# Patient Record
Sex: MALE | Race: BLACK OR AFRICAN AMERICAN | Employment: OTHER | URBAN - METROPOLITAN AREA
[De-identification: names, ages, dates, MRNs, and addresses within clinical notes are randomized per-mention and may not be internally consistent; named-entity substitution may affect disease eponyms.]

---

## 2017-05-13 PROBLEM — G40.909 SEIZURE DISORDER (HCC): Chronic | Status: ACTIVE | Noted: 2017-05-13

## 2017-05-13 PROBLEM — N18.30 CKD (CHRONIC KIDNEY DISEASE) STAGE 3, GFR 30-59 ML/MIN (HCC): Chronic | Status: ACTIVE | Noted: 2017-05-13

## 2017-05-13 PROBLEM — R77.8 ELEVATED TROPONIN: Status: ACTIVE | Noted: 2017-05-13

## 2017-05-15 PROBLEM — M86.9 TOE OSTEOMYELITIS, LEFT (HCC): Status: ACTIVE | Noted: 2017-05-15

## 2017-05-23 ENCOUNTER — HOSPITAL ENCOUNTER (OUTPATIENT)
Dept: WOUND CARE | Age: 67
Discharge: HOME | End: 2017-05-23
Attending: PODIATRIST | Admitting: PODIATRIST

## 2017-05-23 VITALS — DIASTOLIC BLOOD PRESSURE: 89 MMHG | SYSTOLIC BLOOD PRESSURE: 136 MMHG | HEART RATE: 85 BPM | TEMPERATURE: 97.5 F

## 2017-05-23 DIAGNOSIS — S81.801A NON-HEALING WOUND OF LOWER EXTREMITY, RIGHT, INITIAL ENCOUNTER: ICD-10-CM

## 2017-05-23 DIAGNOSIS — E11.621 ISCHEMIC ULCER DIABETIC FOOT (HCC): Primary | ICD-10-CM

## 2017-05-23 DIAGNOSIS — L97.509 ISCHEMIC ULCER DIABETIC FOOT (HCC): Primary | ICD-10-CM

## 2017-05-23 LAB
GLUCOSE BLD-MCNC: 233 MG/DL (ref 70–99)
PERFORMED ON: ABNORMAL

## 2017-05-23 RX ORDER — LIDOCAINE HYDROCHLORIDE 40 MG/ML
SOLUTION TOPICAL ONCE
Status: COMPLETED | OUTPATIENT
Start: 2017-05-23 | End: 2017-05-23

## 2017-05-23 RX ADMIN — LIDOCAINE HYDROCHLORIDE: 40 SOLUTION TOPICAL at 16:01

## 2017-05-23 ASSESSMENT — PAIN SCALES - GENERAL: PAINLEVEL_OUTOF10: 5

## 2017-05-23 ASSESSMENT — PAIN DESCRIPTION - DESCRIPTORS: DESCRIPTORS: SHARP

## 2017-05-23 ASSESSMENT — PAIN DESCRIPTION - LOCATION: LOCATION: SHOULDER

## 2017-05-23 ASSESSMENT — PAIN DESCRIPTION - PAIN TYPE: TYPE: CHRONIC PAIN

## 2017-05-23 ASSESSMENT — PAIN DESCRIPTION - FREQUENCY: FREQUENCY: INTERMITTENT

## 2017-05-23 ASSESSMENT — PAIN DESCRIPTION - ORIENTATION: ORIENTATION: LEFT

## 2017-05-31 ENCOUNTER — OFFICE VISIT (OUTPATIENT)
Dept: CARDIOLOGY CLINIC | Age: 67
End: 2017-05-31

## 2017-05-31 VITALS — DIASTOLIC BLOOD PRESSURE: 68 MMHG | HEART RATE: 74 BPM | SYSTOLIC BLOOD PRESSURE: 118 MMHG

## 2017-05-31 DIAGNOSIS — I73.9 PAD (PERIPHERAL ARTERY DISEASE) (HCC): Primary | ICD-10-CM

## 2017-05-31 DIAGNOSIS — I69.30 HISTORY OF CVA WITH RESIDUAL DEFICIT: Chronic | ICD-10-CM

## 2017-05-31 DIAGNOSIS — I15.9 SECONDARY HYPERTENSION: ICD-10-CM

## 2017-05-31 PROCEDURE — G8420 CALC BMI NORM PARAMETERS: HCPCS | Performed by: INTERNAL MEDICINE

## 2017-05-31 PROCEDURE — 4040F PNEUMOC VAC/ADMIN/RCVD: CPT | Performed by: INTERNAL MEDICINE

## 2017-05-31 PROCEDURE — 93000 ELECTROCARDIOGRAM COMPLETE: CPT | Performed by: INTERNAL MEDICINE

## 2017-05-31 PROCEDURE — 3017F COLORECTAL CA SCREEN DOC REV: CPT | Performed by: INTERNAL MEDICINE

## 2017-05-31 PROCEDURE — G8427 DOCREV CUR MEDS BY ELIG CLIN: HCPCS | Performed by: INTERNAL MEDICINE

## 2017-05-31 PROCEDURE — 1123F ACP DISCUSS/DSCN MKR DOCD: CPT | Performed by: INTERNAL MEDICINE

## 2017-05-31 PROCEDURE — 1111F DSCHRG MED/CURRENT MED MERGE: CPT | Performed by: INTERNAL MEDICINE

## 2017-05-31 PROCEDURE — 1036F TOBACCO NON-USER: CPT | Performed by: INTERNAL MEDICINE

## 2017-05-31 PROCEDURE — 99204 OFFICE O/P NEW MOD 45 MIN: CPT | Performed by: INTERNAL MEDICINE

## 2017-06-05 ENCOUNTER — HOSPITAL ENCOUNTER (OUTPATIENT)
Dept: VASCULAR LAB | Age: 67
Discharge: HOME | End: 2017-06-05
Attending: INTERNAL MEDICINE | Admitting: INTERNAL MEDICINE

## 2017-06-05 DIAGNOSIS — I73.9 PERIPHERAL VASCULAR DISEASE (HCC): ICD-10-CM

## 2017-06-12 ENCOUNTER — TELEPHONE (OUTPATIENT)
Dept: WOUND CARE | Age: 67
End: 2017-06-12

## 2017-06-20 ENCOUNTER — HOSPITAL ENCOUNTER (OUTPATIENT)
Dept: WOUND CARE | Age: 67
Discharge: HOME | End: 2017-06-20
Attending: PODIATRIST | Admitting: PODIATRIST

## 2017-06-20 VITALS
HEART RATE: 81 BPM | SYSTOLIC BLOOD PRESSURE: 174 MMHG | TEMPERATURE: 97.8 F | DIASTOLIC BLOOD PRESSURE: 90 MMHG | RESPIRATION RATE: 18 BRPM

## 2017-06-20 LAB
GLUCOSE BLD-MCNC: 248 MG/DL (ref 70–99)
PERFORMED ON: ABNORMAL

## 2017-06-20 RX ORDER — LIDOCAINE HYDROCHLORIDE 40 MG/ML
SOLUTION TOPICAL ONCE
Status: COMPLETED | OUTPATIENT
Start: 2017-06-20 | End: 2017-06-20

## 2017-06-20 RX ADMIN — LIDOCAINE HYDROCHLORIDE: 40 SOLUTION TOPICAL at 14:58

## 2017-06-20 ASSESSMENT — PAIN DESCRIPTION - LOCATION: LOCATION: FOOT

## 2017-06-20 ASSESSMENT — PAIN DESCRIPTION - ORIENTATION: ORIENTATION: RIGHT

## 2017-06-20 ASSESSMENT — PAIN DESCRIPTION - PAIN TYPE: TYPE: ACUTE PAIN

## 2017-06-20 ASSESSMENT — PAIN SCALES - GENERAL: PAINLEVEL_OUTOF10: 6

## 2017-06-20 ASSESSMENT — PAIN DESCRIPTION - DESCRIPTORS: DESCRIPTORS: ACHING

## 2017-06-20 ASSESSMENT — PAIN DESCRIPTION - ONSET: ONSET: ON-GOING

## 2017-06-20 ASSESSMENT — PAIN DESCRIPTION - FREQUENCY: FREQUENCY: INTERMITTENT

## 2017-06-29 ENCOUNTER — HOSPITAL ENCOUNTER (OUTPATIENT)
Dept: WOUND CARE | Age: 67
Discharge: HOME | End: 2017-06-29
Attending: PODIATRIST | Admitting: PODIATRIST

## 2017-06-29 VITALS
RESPIRATION RATE: 18 BRPM | DIASTOLIC BLOOD PRESSURE: 67 MMHG | SYSTOLIC BLOOD PRESSURE: 112 MMHG | TEMPERATURE: 98.2 F | HEART RATE: 85 BPM

## 2017-06-29 LAB
GLUCOSE BLD-MCNC: 213 MG/DL (ref 70–99)
PERFORMED ON: ABNORMAL

## 2017-06-29 RX ORDER — LIDOCAINE HYDROCHLORIDE 40 MG/ML
SOLUTION TOPICAL ONCE
Status: DISCONTINUED | OUTPATIENT
Start: 2017-06-29 | End: 2017-06-30 | Stop reason: HOSPADM

## 2017-07-06 ENCOUNTER — HOSPITAL ENCOUNTER (OUTPATIENT)
Dept: WOUND CARE | Age: 67
Discharge: HOME | End: 2017-07-06
Attending: PODIATRIST | Admitting: PODIATRIST

## 2017-07-06 VITALS
RESPIRATION RATE: 20 BRPM | HEART RATE: 83 BPM | SYSTOLIC BLOOD PRESSURE: 173 MMHG | DIASTOLIC BLOOD PRESSURE: 88 MMHG | TEMPERATURE: 98.2 F

## 2017-07-06 LAB
GLUCOSE BLD-MCNC: 176 MG/DL (ref 70–99)
PERFORMED ON: ABNORMAL

## 2017-07-06 RX ORDER — LIDOCAINE HYDROCHLORIDE 40 MG/ML
SOLUTION TOPICAL ONCE
Status: COMPLETED | OUTPATIENT
Start: 2017-07-06 | End: 2017-07-06

## 2017-07-06 RX ADMIN — LIDOCAINE HYDROCHLORIDE: 40 SOLUTION TOPICAL at 15:35

## 2017-07-11 ENCOUNTER — HOSPITAL ENCOUNTER (OUTPATIENT)
Dept: WOUND CARE | Age: 67
Discharge: HOME | End: 2017-07-11
Attending: PODIATRIST | Admitting: PODIATRIST

## 2017-07-11 VITALS
HEART RATE: 78 BPM | DIASTOLIC BLOOD PRESSURE: 86 MMHG | TEMPERATURE: 97.9 F | RESPIRATION RATE: 20 BRPM | SYSTOLIC BLOOD PRESSURE: 167 MMHG

## 2017-07-11 RX ORDER — LIDOCAINE HYDROCHLORIDE 40 MG/ML
SOLUTION TOPICAL ONCE
Status: COMPLETED | OUTPATIENT
Start: 2017-07-11 | End: 2017-07-11

## 2017-07-11 RX ADMIN — LIDOCAINE HYDROCHLORIDE: 40 SOLUTION TOPICAL at 15:36

## 2017-07-11 ASSESSMENT — PAIN DESCRIPTION - LOCATION: LOCATION: TOE (COMMENT WHICH ONE)

## 2017-07-11 ASSESSMENT — PAIN DESCRIPTION - DESCRIPTORS: DESCRIPTORS: ACHING

## 2017-07-11 ASSESSMENT — PAIN SCALES - GENERAL: PAINLEVEL_OUTOF10: 3

## 2017-07-11 ASSESSMENT — PAIN DESCRIPTION - ONSET: ONSET: ON-GOING

## 2017-07-11 ASSESSMENT — PAIN DESCRIPTION - PAIN TYPE: TYPE: ACUTE PAIN;CHRONIC PAIN

## 2017-07-11 ASSESSMENT — PAIN DESCRIPTION - ORIENTATION: ORIENTATION: RIGHT

## 2017-07-11 ASSESSMENT — PAIN DESCRIPTION - FREQUENCY: FREQUENCY: INTERMITTENT

## 2017-07-12 ENCOUNTER — OFFICE VISIT (OUTPATIENT)
Dept: CARDIOLOGY CLINIC | Age: 67
End: 2017-07-12

## 2017-07-12 ENCOUNTER — HOSPITAL ENCOUNTER (OUTPATIENT)
Dept: VASCULAR LAB | Age: 67
Discharge: HOME | End: 2017-07-12
Attending: INTERNAL MEDICINE | Admitting: INTERNAL MEDICINE

## 2017-07-12 VITALS
BODY MASS INDEX: 25.77 KG/M2 | SYSTOLIC BLOOD PRESSURE: 132 MMHG | HEART RATE: 82 BPM | WEIGHT: 184.8 LBS | DIASTOLIC BLOOD PRESSURE: 80 MMHG

## 2017-07-12 DIAGNOSIS — I50.42 SYSTOLIC AND DIASTOLIC CHF, CHRONIC (HCC): Chronic | ICD-10-CM

## 2017-07-12 DIAGNOSIS — I73.9 PERIPHERAL VASCULAR DISEASE (HCC): ICD-10-CM

## 2017-07-12 DIAGNOSIS — E11.628 DIABETIC FOOT INFECTION (HCC): ICD-10-CM

## 2017-07-12 DIAGNOSIS — I73.9 PAD (PERIPHERAL ARTERY DISEASE) (HCC): Primary | ICD-10-CM

## 2017-07-12 DIAGNOSIS — L08.9 DIABETIC FOOT INFECTION (HCC): ICD-10-CM

## 2017-07-12 DIAGNOSIS — I15.9 SECONDARY HYPERTENSION: ICD-10-CM

## 2017-07-12 PROCEDURE — 99214 OFFICE O/P EST MOD 30 MIN: CPT | Performed by: INTERNAL MEDICINE

## 2017-07-12 PROCEDURE — G8419 CALC BMI OUT NRM PARAM NOF/U: HCPCS | Performed by: INTERNAL MEDICINE

## 2017-07-12 PROCEDURE — 1036F TOBACCO NON-USER: CPT | Performed by: INTERNAL MEDICINE

## 2017-07-12 PROCEDURE — G8427 DOCREV CUR MEDS BY ELIG CLIN: HCPCS | Performed by: INTERNAL MEDICINE

## 2017-07-12 PROCEDURE — 3046F HEMOGLOBIN A1C LEVEL >9.0%: CPT | Performed by: INTERNAL MEDICINE

## 2017-07-12 PROCEDURE — 1123F ACP DISCUSS/DSCN MKR DOCD: CPT | Performed by: INTERNAL MEDICINE

## 2017-07-12 PROCEDURE — 3017F COLORECTAL CA SCREEN DOC REV: CPT | Performed by: INTERNAL MEDICINE

## 2017-07-12 PROCEDURE — 4040F PNEUMOC VAC/ADMIN/RCVD: CPT | Performed by: INTERNAL MEDICINE

## 2017-07-17 DIAGNOSIS — I73.9 PAD (PERIPHERAL ARTERY DISEASE) (HCC): Primary | ICD-10-CM

## 2017-07-17 DIAGNOSIS — I15.9 SECONDARY HYPERTENSION: ICD-10-CM

## 2017-07-18 ENCOUNTER — HOSPITAL ENCOUNTER (OUTPATIENT)
Dept: WOUND CARE | Age: 67
Discharge: HOME | End: 2017-07-18
Attending: PODIATRIST | Admitting: PODIATRIST

## 2017-07-18 VITALS
HEART RATE: 89 BPM | SYSTOLIC BLOOD PRESSURE: 149 MMHG | RESPIRATION RATE: 20 BRPM | TEMPERATURE: 96 F | DIASTOLIC BLOOD PRESSURE: 81 MMHG

## 2017-07-18 LAB
GLUCOSE BLD-MCNC: 123 MG/DL (ref 70–99)
PERFORMED ON: ABNORMAL

## 2017-07-18 RX ORDER — LIDOCAINE HYDROCHLORIDE 40 MG/ML
SOLUTION TOPICAL ONCE
Status: COMPLETED | OUTPATIENT
Start: 2017-07-18 | End: 2017-07-18

## 2017-07-18 RX ADMIN — LIDOCAINE HYDROCHLORIDE: 40 SOLUTION TOPICAL at 15:27

## 2017-07-18 ASSESSMENT — PAIN DESCRIPTION - ORIENTATION: ORIENTATION: LEFT

## 2017-07-18 ASSESSMENT — PAIN DESCRIPTION - PAIN TYPE: TYPE: ACUTE PAIN;CHRONIC PAIN

## 2017-07-18 ASSESSMENT — PAIN DESCRIPTION - LOCATION: LOCATION: SHOULDER

## 2017-07-18 ASSESSMENT — PAIN DESCRIPTION - FREQUENCY: FREQUENCY: INTERMITTENT

## 2017-07-18 ASSESSMENT — PAIN SCALES - GENERAL: PAINLEVEL_OUTOF10: 7

## 2017-07-18 ASSESSMENT — PAIN DESCRIPTION - ONSET: ONSET: ON-GOING

## 2017-07-18 ASSESSMENT — PAIN DESCRIPTION - DESCRIPTORS: DESCRIPTORS: ACHING

## 2017-07-19 ENCOUNTER — TELEPHONE (OUTPATIENT)
Dept: CARDIOLOGY CLINIC | Age: 67
End: 2017-07-19

## 2017-07-20 ENCOUNTER — TELEPHONE (OUTPATIENT)
Dept: WOUND CARE | Age: 67
End: 2017-07-20

## 2017-07-25 ENCOUNTER — HOSPITAL ENCOUNTER (OUTPATIENT)
Dept: WOUND CARE | Age: 67
Discharge: HOME | End: 2017-07-25
Attending: PODIATRIST | Admitting: PODIATRIST

## 2017-07-25 VITALS
HEART RATE: 83 BPM | DIASTOLIC BLOOD PRESSURE: 73 MMHG | SYSTOLIC BLOOD PRESSURE: 142 MMHG | RESPIRATION RATE: 16 BRPM | TEMPERATURE: 98.2 F

## 2017-07-25 RX ORDER — LIDOCAINE HYDROCHLORIDE 40 MG/ML
SOLUTION TOPICAL ONCE
Status: COMPLETED | OUTPATIENT
Start: 2017-07-25 | End: 2017-07-25

## 2017-07-25 RX ADMIN — LIDOCAINE HYDROCHLORIDE: 40 SOLUTION TOPICAL at 15:16

## 2017-08-01 ENCOUNTER — HOSPITAL ENCOUNTER (OUTPATIENT)
Dept: WOUND CARE | Age: 67
Discharge: HOME | End: 2017-08-01
Attending: PODIATRIST | Admitting: PODIATRIST

## 2017-08-01 VITALS
RESPIRATION RATE: 18 BRPM | TEMPERATURE: 98.2 F | DIASTOLIC BLOOD PRESSURE: 82 MMHG | HEART RATE: 74 BPM | SYSTOLIC BLOOD PRESSURE: 141 MMHG

## 2017-08-01 RX ORDER — LIDOCAINE HYDROCHLORIDE 40 MG/ML
SOLUTION TOPICAL ONCE
Status: COMPLETED | OUTPATIENT
Start: 2017-08-01 | End: 2017-08-01

## 2017-08-01 RX ADMIN — LIDOCAINE HYDROCHLORIDE: 40 SOLUTION TOPICAL at 15:34

## 2017-08-01 ASSESSMENT — PAIN DESCRIPTION - ONSET: ONSET: ON-GOING

## 2017-08-01 ASSESSMENT — PAIN DESCRIPTION - ORIENTATION: ORIENTATION: LEFT;RIGHT

## 2017-08-01 ASSESSMENT — PAIN SCALES - GENERAL: PAINLEVEL_OUTOF10: 7

## 2017-08-01 ASSESSMENT — PAIN DESCRIPTION - DESCRIPTORS: DESCRIPTORS: ACHING

## 2017-08-01 ASSESSMENT — PAIN DESCRIPTION - PAIN TYPE: TYPE: CHRONIC PAIN

## 2017-08-01 ASSESSMENT — PAIN DESCRIPTION - LOCATION: LOCATION: SHOULDER;BACK;FOOT

## 2017-08-01 ASSESSMENT — PAIN DESCRIPTION - FREQUENCY: FREQUENCY: INTERMITTENT

## 2017-08-08 ENCOUNTER — HOSPITAL ENCOUNTER (OUTPATIENT)
Dept: WOUND CARE | Age: 67
Discharge: HOME | End: 2017-08-08
Attending: PODIATRIST | Admitting: PODIATRIST

## 2017-08-08 VITALS
TEMPERATURE: 98.2 F | SYSTOLIC BLOOD PRESSURE: 155 MMHG | HEART RATE: 80 BPM | RESPIRATION RATE: 18 BRPM | DIASTOLIC BLOOD PRESSURE: 77 MMHG

## 2017-08-08 LAB
GLUCOSE BLD-MCNC: 252 MG/DL (ref 70–99)
PERFORMED ON: ABNORMAL

## 2017-08-08 RX ORDER — LIDOCAINE HYDROCHLORIDE 40 MG/ML
SOLUTION TOPICAL ONCE
Status: COMPLETED | OUTPATIENT
Start: 2017-08-08 | End: 2017-08-08

## 2017-08-08 RX ADMIN — LIDOCAINE HYDROCHLORIDE: 40 SOLUTION TOPICAL at 15:20

## 2017-08-08 ASSESSMENT — PAIN SCALES - GENERAL: PAINLEVEL_OUTOF10: 4

## 2017-08-08 ASSESSMENT — PAIN DESCRIPTION - LOCATION: LOCATION: LEG

## 2017-08-08 ASSESSMENT — PAIN DESCRIPTION - DESCRIPTORS: DESCRIPTORS: ACHING

## 2017-08-15 ENCOUNTER — HOSPITAL ENCOUNTER (OUTPATIENT)
Dept: WOUND CARE | Age: 67
Discharge: HOME | End: 2017-08-15
Attending: PODIATRIST | Admitting: PODIATRIST

## 2017-08-15 VITALS
RESPIRATION RATE: 18 BRPM | SYSTOLIC BLOOD PRESSURE: 145 MMHG | DIASTOLIC BLOOD PRESSURE: 83 MMHG | TEMPERATURE: 98 F | HEART RATE: 73 BPM

## 2017-08-15 LAB
GLUCOSE BLD-MCNC: 355 MG/DL (ref 70–99)
PERFORMED ON: ABNORMAL

## 2017-08-15 RX ORDER — LIDOCAINE HYDROCHLORIDE 40 MG/ML
SOLUTION TOPICAL ONCE
Status: COMPLETED | OUTPATIENT
Start: 2017-08-15 | End: 2017-08-15

## 2017-08-15 RX ADMIN — LIDOCAINE HYDROCHLORIDE: 40 SOLUTION TOPICAL at 14:32

## 2017-08-15 ASSESSMENT — PAIN DESCRIPTION - PAIN TYPE: TYPE: ACUTE PAIN

## 2017-08-15 ASSESSMENT — PAIN DESCRIPTION - DESCRIPTORS: DESCRIPTORS: ACHING

## 2017-08-15 ASSESSMENT — PAIN DESCRIPTION - ORIENTATION: ORIENTATION: RIGHT

## 2017-08-15 ASSESSMENT — PAIN SCALES - GENERAL: PAINLEVEL_OUTOF10: 6

## 2017-08-15 ASSESSMENT — PAIN DESCRIPTION - LOCATION: LOCATION: SHOULDER;LEG;FOOT

## 2017-08-15 ASSESSMENT — PAIN DESCRIPTION - FREQUENCY: FREQUENCY: INTERMITTENT

## 2017-08-22 ENCOUNTER — HOSPITAL ENCOUNTER (OUTPATIENT)
Dept: WOUND CARE | Age: 67
Discharge: HOME | End: 2017-08-22
Attending: PODIATRIST | Admitting: PODIATRIST

## 2017-08-22 VITALS
HEART RATE: 75 BPM | SYSTOLIC BLOOD PRESSURE: 130 MMHG | DIASTOLIC BLOOD PRESSURE: 76 MMHG | RESPIRATION RATE: 18 BRPM | TEMPERATURE: 97.9 F

## 2017-08-22 LAB
GLUCOSE BLD-MCNC: 186 MG/DL (ref 70–99)
PERFORMED ON: ABNORMAL

## 2017-08-22 RX ORDER — LIDOCAINE HYDROCHLORIDE 40 MG/ML
SOLUTION TOPICAL ONCE
Status: COMPLETED | OUTPATIENT
Start: 2017-08-22 | End: 2017-08-22

## 2017-08-22 RX ADMIN — LIDOCAINE HYDROCHLORIDE: 40 SOLUTION TOPICAL at 15:06

## 2017-08-29 ENCOUNTER — HOSPITAL ENCOUNTER (OUTPATIENT)
Dept: WOUND CARE | Age: 67
Discharge: HOME | End: 2017-08-29
Attending: PODIATRIST | Admitting: PODIATRIST

## 2017-08-29 VITALS
TEMPERATURE: 98 F | HEART RATE: 76 BPM | SYSTOLIC BLOOD PRESSURE: 153 MMHG | DIASTOLIC BLOOD PRESSURE: 71 MMHG | RESPIRATION RATE: 18 BRPM

## 2017-08-29 LAB
GLUCOSE BLD-MCNC: 103 MG/DL (ref 70–99)
PERFORMED ON: ABNORMAL

## 2017-08-29 RX ORDER — LIDOCAINE HYDROCHLORIDE 40 MG/ML
2.5 SOLUTION TOPICAL ONCE
Status: COMPLETED | OUTPATIENT
Start: 2017-08-29 | End: 2017-08-29

## 2017-08-29 RX ADMIN — LIDOCAINE HYDROCHLORIDE 2.5 ML: 40 SOLUTION TOPICAL at 15:03

## 2017-08-29 ASSESSMENT — PAIN DESCRIPTION - LOCATION: LOCATION: BACK;KNEE;SHOULDER

## 2017-08-29 ASSESSMENT — PAIN DESCRIPTION - DESCRIPTORS: DESCRIPTORS: ACHING

## 2017-08-29 ASSESSMENT — PAIN SCALES - GENERAL: PAINLEVEL_OUTOF10: 3

## 2017-08-29 ASSESSMENT — PAIN DESCRIPTION - ORIENTATION: ORIENTATION: LEFT;RIGHT

## 2017-09-05 ENCOUNTER — HOSPITAL ENCOUNTER (OUTPATIENT)
Dept: WOUND CARE | Age: 67
Discharge: HOME | End: 2017-09-05
Attending: PODIATRIST | Admitting: PODIATRIST

## 2017-09-05 VITALS
HEART RATE: 73 BPM | SYSTOLIC BLOOD PRESSURE: 144 MMHG | DIASTOLIC BLOOD PRESSURE: 73 MMHG | TEMPERATURE: 97.9 F | RESPIRATION RATE: 18 BRPM

## 2017-09-05 LAB
GLUCOSE BLD-MCNC: 171 MG/DL (ref 70–99)
PERFORMED ON: ABNORMAL

## 2017-09-05 RX ORDER — LIDOCAINE HYDROCHLORIDE 40 MG/ML
2.5 SOLUTION TOPICAL ONCE
Status: COMPLETED | OUTPATIENT
Start: 2017-09-05 | End: 2017-09-05

## 2017-09-05 RX ADMIN — LIDOCAINE HYDROCHLORIDE 2.5 ML: 40 SOLUTION TOPICAL at 15:45

## 2017-09-05 ASSESSMENT — PAIN DESCRIPTION - LOCATION: LOCATION: SHOULDER

## 2017-09-05 ASSESSMENT — PAIN DESCRIPTION - ORIENTATION: ORIENTATION: LEFT

## 2017-09-05 ASSESSMENT — PAIN DESCRIPTION - PAIN TYPE: TYPE: CHRONIC PAIN

## 2017-09-05 ASSESSMENT — PAIN SCALES - GENERAL: PAINLEVEL_OUTOF10: 3

## 2017-09-05 ASSESSMENT — PAIN DESCRIPTION - DESCRIPTORS: DESCRIPTORS: ACHING;DULL;SHARP

## 2017-09-08 RX ORDER — CARVEDILOL 6.25 MG/1
6.25 TABLET ORAL 2 TIMES DAILY WITH MEALS
Qty: 180 TABLET | Refills: 3 | Status: ON HOLD | OUTPATIENT
Start: 2017-09-08 | End: 2018-01-01 | Stop reason: HOSPADM

## 2017-09-12 ENCOUNTER — HOSPITAL ENCOUNTER (OUTPATIENT)
Dept: WOUND CARE | Age: 67
Discharge: HOME | End: 2017-09-12
Attending: PODIATRIST | Admitting: PODIATRIST

## 2017-09-12 VITALS
HEART RATE: 80 BPM | RESPIRATION RATE: 18 BRPM | TEMPERATURE: 98.7 F | DIASTOLIC BLOOD PRESSURE: 103 MMHG | SYSTOLIC BLOOD PRESSURE: 197 MMHG

## 2017-09-12 LAB
GLUCOSE BLD-MCNC: 333 MG/DL (ref 70–99)
PERFORMED ON: ABNORMAL

## 2017-09-12 RX ORDER — LIDOCAINE HYDROCHLORIDE 40 MG/ML
SOLUTION TOPICAL ONCE
Status: COMPLETED | OUTPATIENT
Start: 2017-09-12 | End: 2017-09-12

## 2017-09-19 ENCOUNTER — HOSPITAL ENCOUNTER (OUTPATIENT)
Dept: WOUND CARE | Age: 67
Discharge: HOME | End: 2017-09-19
Attending: PODIATRIST | Admitting: PODIATRIST

## 2017-09-19 VITALS
SYSTOLIC BLOOD PRESSURE: 156 MMHG | DIASTOLIC BLOOD PRESSURE: 63 MMHG | HEART RATE: 76 BPM | TEMPERATURE: 98.1 F | RESPIRATION RATE: 16 BRPM

## 2017-09-19 LAB
GLUCOSE BLD-MCNC: 244 MG/DL (ref 70–99)
PERFORMED ON: ABNORMAL

## 2017-09-19 RX ORDER — LIDOCAINE HYDROCHLORIDE 40 MG/ML
2.5 SOLUTION TOPICAL ONCE
Status: COMPLETED | OUTPATIENT
Start: 2017-09-19 | End: 2017-09-19

## 2017-09-19 RX ADMIN — LIDOCAINE HYDROCHLORIDE 2.5 ML: 40 SOLUTION TOPICAL at 15:07

## 2017-09-19 ASSESSMENT — PAIN DESCRIPTION - ORIENTATION: ORIENTATION: LEFT

## 2017-09-19 ASSESSMENT — PAIN DESCRIPTION - PAIN TYPE: TYPE: CHRONIC PAIN

## 2017-09-19 ASSESSMENT — PAIN SCALES - GENERAL: PAINLEVEL_OUTOF10: 7

## 2017-09-19 ASSESSMENT — PAIN DESCRIPTION - LOCATION: LOCATION: SHOULDER

## 2017-09-19 ASSESSMENT — PAIN DESCRIPTION - PROGRESSION: CLINICAL_PROGRESSION: NOT CHANGED

## 2017-09-19 ASSESSMENT — PAIN DESCRIPTION - DESCRIPTORS: DESCRIPTORS: ACHING

## 2017-09-19 ASSESSMENT — PAIN DESCRIPTION - FREQUENCY: FREQUENCY: CONTINUOUS

## 2017-09-19 ASSESSMENT — PAIN DESCRIPTION - ONSET: ONSET: ON-GOING

## 2017-09-26 ENCOUNTER — HOSPITAL ENCOUNTER (OUTPATIENT)
Dept: WOUND CARE | Age: 67
Discharge: HOME | End: 2017-09-26
Attending: PODIATRIST | Admitting: PODIATRIST

## 2017-09-26 VITALS
RESPIRATION RATE: 16 BRPM | DIASTOLIC BLOOD PRESSURE: 82 MMHG | SYSTOLIC BLOOD PRESSURE: 165 MMHG | TEMPERATURE: 98.2 F | HEART RATE: 76 BPM

## 2017-09-26 RX ORDER — LIDOCAINE HYDROCHLORIDE 40 MG/ML
SOLUTION TOPICAL ONCE
Status: COMPLETED | OUTPATIENT
Start: 2017-09-26 | End: 2017-09-26

## 2017-09-26 RX ADMIN — LIDOCAINE HYDROCHLORIDE: 40 SOLUTION TOPICAL at 15:21

## 2017-09-26 ASSESSMENT — PAIN DESCRIPTION - ORIENTATION: ORIENTATION: LEFT

## 2017-09-26 ASSESSMENT — PAIN DESCRIPTION - DESCRIPTORS: DESCRIPTORS: ACHING

## 2017-09-26 ASSESSMENT — PAIN DESCRIPTION - PAIN TYPE: TYPE: CHRONIC PAIN

## 2017-09-26 ASSESSMENT — PAIN DESCRIPTION - LOCATION: LOCATION: SHOULDER

## 2017-09-26 ASSESSMENT — PAIN DESCRIPTION - PROGRESSION: CLINICAL_PROGRESSION: NOT CHANGED

## 2017-09-26 ASSESSMENT — PAIN SCALES - GENERAL: PAINLEVEL_OUTOF10: 6

## 2017-10-05 ENCOUNTER — HOSPITAL ENCOUNTER (OUTPATIENT)
Dept: WOUND CARE | Age: 67
Discharge: HOME | End: 2017-10-05
Attending: PODIATRIST | Admitting: PODIATRIST

## 2017-10-05 VITALS
TEMPERATURE: 98.6 F | HEART RATE: 82 BPM | RESPIRATION RATE: 16 BRPM | SYSTOLIC BLOOD PRESSURE: 160 MMHG | DIASTOLIC BLOOD PRESSURE: 78 MMHG

## 2017-10-17 ENCOUNTER — HOSPITAL ENCOUNTER (OUTPATIENT)
Dept: WOUND CARE | Age: 67
Discharge: HOME | End: 2017-10-17
Attending: PODIATRIST | Admitting: PODIATRIST

## 2017-10-17 VITALS
TEMPERATURE: 97.5 F | HEART RATE: 88 BPM | RESPIRATION RATE: 16 BRPM | SYSTOLIC BLOOD PRESSURE: 158 MMHG | DIASTOLIC BLOOD PRESSURE: 93 MMHG

## 2017-10-17 RX ORDER — LIDOCAINE HYDROCHLORIDE 40 MG/ML
2.5 SOLUTION TOPICAL ONCE
Status: COMPLETED | OUTPATIENT
Start: 2017-10-17 | End: 2017-10-17

## 2017-10-17 RX ADMIN — LIDOCAINE HYDROCHLORIDE 2.5 ML: 40 SOLUTION TOPICAL at 15:15

## 2017-10-17 NOTE — PROGRESS NOTES
[address] [facility] [place]  Progress Note and Procedure Note      Xiomara Licona  MEDICAL RECORD NUMBER:  [id number]  AGE: 75 y.o. GENDER: male  : 1950  EPISODE DATE:  10/17/2017    Subjective:       Chief Complaint   Patient presents with    Wound Check     right foot         HISTORY of PRESENT ILLNESS HPI     Xiomara Licona is a 75 y.o. male who presents today for wound evaluation. History of Wound Context: Mr. Eve Virgen presents after having missed several appointments due to transportation issues, now has exposed phalanx of LEFT 2nd toe  Wound Pain Timing/Severity: none  Quality of pain: N/A  Severity:  0 / 10   Modifying Factors: None  Associated Signs/Symptoms: edema, erythema, drainage, numbness and odor    Ulcer Identification:  Ulcer Type: diabetic, traumatic and neuropathic  Contributing Factors: edema, diabetes, poor glucose control, chronic pressure, shear force and arterial insufficiency          PAST MEDICAL HISTORY        Diagnosis Date    CAD (coronary artery disease)     minor, per cardiac cath    Diabetes mellitus ([address]     Diabetic infection of left foot ([address]     w/osteomyelitis    H/O cocaine abuse 2015    History of CVA with residual deficit 2012    Hypertension     MRSA (methicillin resistant staph aureus) culture positive 2017    toe    Non-healing wound of lower extremity 2016    L great toe    Non-ischemic cardiomyopathy ([address]     per cardiac cath    PAD (peripheral artery disease) (HCC) 2016    L PIPER - 0.6, unable to obtain readings on R    Peripheral neuropathy (HCC)     Seizure, convulsion ([address] 2016    Dr. Raj Leung - due to scar from old L frontal infarction    Unspecified cerebral artery occlusion with cerebral infarction 2012       PAST SURGICAL HISTORY    Past Surgical History:   Procedure Laterality Date    CARDIAC CATHETERIZATION  2012    Dr. Martin Swann Right     FOOT TENDON SURGERY Left 05/24/2013    , DPM - I&D to antebellum cortex w/wound closure, perineal tendon transfer    KNEE ARTHROSCOPY      OTHER SURGICAL HISTORY Left 05/17/2017    INCISION AND DRAINAGE, AMPUTATION OF HALLUX LEFT FOOT    TOE AMPUTATION Left 05/21/2013    Dr. Linda Ball, DPM - I&D to bone cortex w/amputation of 5th metatarsal       FAMILY HISTORY    No family history on file. SOCIAL HISTORY    Social History   Substance Use Topics    Smoking status: Former Smoker     Types: Cigarettes     Quit date: 2/13/1993    Smokeless tobacco: Never Used      Comment: 2/13/2013 smokes a cigarette or cigar once or twice a year    Alcohol use Yes      Comment: 2/13/2013, drinks every so often (cold beer, or wine)       ALLERGIES    No Known Allergies    MEDICATIONS    Current Outpatient Prescriptions on File Prior to Encounter   Medication Sig Dispense Refill    carvedilol (COREG) 6.25 MG tablet Take 1 tablet by mouth 2 times daily (with meals) 180 tablet 3    aspirin 81 MG chewable tablet Take 1 tablet by mouth daily 90 tablet 3    gabapentin (NEURONTIN) 600 MG tablet Take 600 mg by mouth 2 times daily      busPIRone (BUSPAR) 10 MG tablet Take 7.5 mg by mouth 2 times daily      losartan (COZAAR) 25 MG tablet Take 25 mg by mouth daily      atorvastatin (LIPITOR) 10 MG tablet Take 10 mg by mouth nightly      insulin glargine (LANTUS) 100 UNIT/ML injection vial Inject 20 Units into the skin nightly      clopidogrel (PLAVIX) 75 MG tablet Take 1 tablet by mouth daily (Patient taking differently: Take 75 mg by mouth daily Patient states he is not taking) 90 tablet 3    HYDROcodone-acetaminophen (NORCO) 5-325 MG per tablet Take 1 tablet by mouth every 8 hours as needed for Pain  1 tab po q8h prn pain. 15 tablet 0     No current facility-administered medications on file prior to encounter. REVIEW OF SYSTEMS    Pertinent items are noted in HPI.       Objective:      BP (!) 158/93   Pulse 88 Temp 97.5 °F (36.4 °C) (Oral)   Resp 16     Wt Readings from Last 3 Encounters:   07/20/17 182 lb (82.6 kg)   07/12/17 184 lb 12.8 oz (83.8 kg)   05/18/17 175 lb 14.8 oz (79.8 kg)       PHYSICAL EXAM    General Appearance: alert and oriented to person, place and time, well-developed and well-nourished, in no acute distress          Assessment:     Patient Active Problem List   Diagnosis    Chest pain    Leg edema    Systolic and diastolic CHF, chronic ([address]    DM (diabetes mellitus) ([address]    Essential hypertension    History of CVA with residual deficit    Cellulitis of foot    Diabetic foot infection ([address]    MADYSON (acute kidney injury) ([address]    H/O cocaine abuse    New onset seizure ([address]    Chronic generalized pain    PAD (peripheral artery disease) ([address]    Seizure, convulsion ([address]    Diabetic infection of left foot ([address]    Non-ischemic cardiomyopathy ([address]    Non-healing wound of lower extremity    Osteomyelitis of left foot ([address]    Ischemic ulcer diabetic foot ([address]    Seizure disorder ([address]    Elevated troponin    CKD (chronic kidney disease) stage 3, GFR 30-59 ml/min    Toe osteomyelitis, left ([address]    Critical lower limb ischemia    S/P angioplasty with stent         Procedure Note  Indications:  Based on my examination of this patient's wound(s)/ulcer(s) today, debridement is required to promote healing and evaluate the extent healing. Performed by: Karl Moore DPM    Consent obtained: Yes    Time out taken:  Yes    Pain Control: Anesthetic  Anesthetic: 4% Topical Xylocaine       Debridement:Excisional Debridement    Using tissue nippers the wound(s)/ulcer(s) was/were sharply debrided down through and including the removal of muscle/fascia.     Devitalized Tissue Debrided:  distal digit    Pre Debridement Measurements:  Are located in the Wound/Ulcer Documentation Flow Sheet    Wound/Ulcer #: 3    Post Debridement Measurements:  Wound/Ulcer Descriptions are Pre Debridement except measurements:    Wound 05/23/17 Foot Right;Plantar;Lateral #3- diabetic (Active)   Wound Type Wound 10/17/2017  3:08 PM   Wound Diabetic Porter 2 9/12/2017  3:47 PM   Dressing/Treatment Other (Comment) 10/5/2017  2:27 PM   Wound Cleansed Rinsed/Irrigated with saline 10/17/2017  3:08 PM   Wound Length (cm) 2.2 cm 10/17/2017  3:08 PM   Wound Width (cm) 1.9 cm 10/17/2017  3:08 PM   Wound Depth (cm)  0.3 10/17/2017  3:08 PM   Calculated Wound Size (cm^2) (l*w) 4.18 cm^2 10/17/2017  3:08 PM   Change in Wound Size % (l*w) 9.52 10/17/2017  3:08 PM   Tunneling Position ___ O'Clock 0 9/26/2017  3:09 PM   Undermining Starts ___ O'Clock 1:00 10/17/2017  3:08 PM   Undermining Ends___ O'Clock 3:00 10/17/2017  3:08 PM   Undermining Maxium Distance (cm) 0.6 10/17/2017  3:08 PM   Wound Assessment Drainage;Kent; White 10/17/2017  3:08 PM   Margins Defined edges 10/17/2017  3:08 PM   Candida-wound Assessment Brown; Maceration;Pink; White 10/17/2017  3:08 PM   Non-staged Wound Description Full thickness 9/12/2017  3:47 PM   [place]%Wound Bed 70 10/17/2017  3:08 PM   Red%Wound Bed 90 9/5/2017  3:33 PM   Yellow%Wound Bed 20 10/17/2017  3:08 PM   Black%Wound Bed 10 10/17/2017  3:08 PM   Drainage Amount Moderate 10/17/2017  3:08 PM   Drainage Description Black; Brown;Yellow 10/17/2017  3:08 PM   Odor Mild 10/17/2017  3:08 PM   Op First Treatment Date 05/23/17 8/22/2017  2:55 PM   Number of days: 146       Percent of Wound Debrided: 100%    Total Surface Area Debrided:  4.18 sq cm     Diabetic/Pressure/Non Pressure Ulcers:  Ulcer: Diabetic ulcer, muscle necrosis        Bleeding:  Minimal    Hemostasis Achieved:  by pressure    Procedural Pain:  0  / 10     Post Procedural Pain:  0 / 10     Response to treatment:  Well tolerated by patient.      Plan:   Due to exposed bone, patient driectly admitted for surgical work up in next 48 hours for digital amputation and further wound follow up    Treatment Note please see attached Discharge

## 2017-10-24 ENCOUNTER — HOSPITAL ENCOUNTER (OUTPATIENT)
Dept: WOUND CARE | Age: 67
Discharge: HOME | End: 2017-10-24
Attending: PODIATRIST | Admitting: PODIATRIST

## 2017-10-24 VITALS
SYSTOLIC BLOOD PRESSURE: 161 MMHG | HEART RATE: 73 BPM | RESPIRATION RATE: 18 BRPM | DIASTOLIC BLOOD PRESSURE: 72 MMHG | TEMPERATURE: 97.7 F

## 2017-10-24 RX ORDER — LIDOCAINE HYDROCHLORIDE 40 MG/ML
2.5 SOLUTION TOPICAL ONCE
Status: COMPLETED | OUTPATIENT
Start: 2017-10-24 | End: 2017-10-24

## 2017-10-24 RX ADMIN — LIDOCAINE HYDROCHLORIDE 2.5 ML: 40 SOLUTION TOPICAL at 15:57

## 2017-10-24 ASSESSMENT — PAIN DESCRIPTION - LOCATION: LOCATION: FOOT

## 2017-10-24 ASSESSMENT — PAIN SCALES - GENERAL: PAINLEVEL_OUTOF10: 3

## 2017-10-24 ASSESSMENT — PAIN DESCRIPTION - ORIENTATION: ORIENTATION: RIGHT;LEFT

## 2017-10-24 ASSESSMENT — PAIN DESCRIPTION - DESCRIPTORS: DESCRIPTORS: SHARP

## 2017-10-24 ASSESSMENT — PAIN DESCRIPTION - PAIN TYPE: TYPE: ACUTE PAIN

## 2017-10-24 ASSESSMENT — PAIN DESCRIPTION - FREQUENCY: FREQUENCY: INTERMITTENT

## 2017-10-25 NOTE — PROGRESS NOTES
[facility]   Progress Note and Procedure Note      Liu Rojas  MEDICAL RECORD NUMBER:  [id number]  AGE: 75 y.o. GENDER: male  : 1950  EPISODE DATE:  10/24/2017    Subjective:     Chief Complaint   Patient presents with    Wound Check         HISTORY of PRESENT ILLNESS HPI     Liu Rojas is a 75 y.o. male who presents today for wound/ulcer evaluation. History of Wound Context: right foot wound at the distal aspect of a previous TMA. Patient has recently has his left great toe amputated. He has been having local wound care at home per his home health care nurse. He has been recently revascularized by Dr. Harsha Dumont. Patient relates that he has been being more compliant with wearing his CAM boot. He has kept his dressing on and has not had it changed since he was discharged from the hospital.  Home health care relates that he has not been returning their calls to schedule a dressing change. Wound/Ulcer Pain Timing/Severity: none  Quality of pain: N/A  Severity:  0 / 10   Modifying Factors: None  Associated Signs/Symptoms: none    Ulcer Identification:  Ulcer Type: arterial, diabetic and pressure  Contributing Factors: diabetes and arterial insufficiency    Wound: N/A        PAST MEDICAL HISTORY        Diagnosis Date    CAD (coronary artery disease)     minor, per cardiac cath    Diabetes mellitus ([address]     Diabetic infection of left foot ([address]     w/osteomyelitis    H/O cocaine abuse 2015    History of CVA with residual deficit 2012    Hypertension     MRSA (methicillin resistant staph aureus) culture positive 2017    toe    Non-healing wound of lower extremity 2016    L great toe    Non-ischemic cardiomyopathy ([address]     per cardiac cath    PAD (peripheral artery disease) ([address] 2016    L PIPER - 0.6, unable to obtain readings on R    Peripheral neuropathy (HCC)     Seizure, convulsion ([address] 2016    Dr. Maria Teresa Leung - due to scar CVA with residual deficit I69.30    Cellulitis of foot L03.119    Diabetic foot infection ([facility]) E11.69, L08.9    MADYSON (acute kidney injury) ([facility] N17.9    H/O cocaine abuse Z87.898    New onset seizure ([facility] R56.9    Chronic generalized pain R52, G89.29    PAD (peripheral artery disease) ([facility]) I73.9    Seizure, convulsion ([facility]) R56.9    Diabetic infection of left foot ([facility]) E11.69, L08.9    Non-ischemic cardiomyopathy ([facility]) I42.8    Non-healing wound of lower extremity S81.809A    Osteomyelitis of left foot ([facility]) M86.9    Ischemic ulcer diabetic foot ([facility] E11.621, L97.509    Seizure disorder ([facility]) G40.909    Elevated troponin R74.8    CKD (chronic kidney disease) stage 3, GFR 30-59 ml/min N18.3    Toe osteomyelitis, left ([facility]) M86.9    Critical lower limb ischemia I99.8    S/P angioplasty with stent Z95.9        Procedure Note  Indications:  Based on my examination of this patient's wound(s)/ulcer(s) today, debridement is required to promote healing and evaluate the wound base. Performed by: Maite Acevedo DPM    Consent obtained:  Yes    Time out taken:  Yes    Pain Control: Anesthetic  Anesthetic: 4% Topical Xylocaine       Debridement:Excisional Debridement    Using curette, #15 blade scalpel and forceps the wound(s)/ulcer(s) was/were sharply debrided down through and including the removal of epidermis, dermis and subcutaneous tissue. Devitalized Tissue Debrided:  fibrin and slough    Pre Debridement Measurements:  Are located in the [facility]  Documentation Flow Sheet    Wound/Ulcer #: 3    Post Debridement Measurements:  Wound/Ulcer Descriptions are Pre Debridement except measurements:    Wound 05/23/17 Foot Right;Plantar;Lateral #3- diabetic (Active)   Wound Type Wound 10/18/2017  3:52 PM   Wound Diabetic Porter 2 10/24/2017  3:47 PM   Dressing Status Clean;Dry; Intact 10/20/2017  9:39 AM   Dressing/Treatment Other (Comment) 10/24/2017  3:47 PM   Wound Cleansed Rinsed/Irrigated with saline 10/24/2017  3:47 PM   Wound Length (cm) 2 cm 10/24/2017  3:47 PM   Wound Width (cm) 2 cm 10/24/2017  3:47 PM   Wound Depth (cm)  0.4  10/24/2017  3:47 PM   Calculated Wound Size (cm^2) (l*w) 3.23 cm^2 10/24/2017  3:47 PM   Change in Wound Size % (l*w) 30.09 10/24/2017  3:47 PM   Tunneling Position ___ O'Clock 0 10/24/2017  3:47 PM   Undermining Starts ___ O'Clock 1:00 10/17/2017  3:08 PM   Undermining Ends___ O'Clock 0 10/24/2017  3:47 PM   Undermining Maxium Distance (cm) 0.6 10/17/2017  3:08 PM   Wound Assessment Red;Yellow 10/24/2017  3:47 PM   Margins Defined edges 10/24/2017  3:47 PM   Candida-wound Assessment Maceration; White 10/24/2017  3:47 PM   Non-staged Wound Description Full thickness 10/24/2017  3:47 PM   [place]%Wound Bed 70 10/17/2017  3:08 PM   Red%Wound Bed 90 10/24/2017  3:47 PM   Yellow%Wound Bed 10 10/24/2017  3:47 PM   Black%Wound Bed 10 10/17/2017  3:08 PM   Drainage Amount Small 10/24/2017  3:47 PM   Drainage Description Yellow 10/24/2017  3:47 PM   Odor None 10/24/2017  3:47 PM   Op First Treatment Date 05/23/17 8/22/2017  2:55 PM   Number of days: 154       Incision 10/19/17 Foot Left (Active)   Wound Assessment [facility]UH 10/20/2017  9:39 AM   Candida-wound Assessment Dry 10/24/2017  3:47 PM   Closure Sutures 10/24/2017  3:47 PM   Drainage Amount Small 10/24/2017  3:47 PM   Drainage Description Other (Comment) 10/24/2017  3:47 PM   Odor None 10/24/2017  3:47 PM   Dressing/Treatment Other (Comment) 10/24/2017  3:47 PM   Dressing Changed Changed/New 10/19/2017  9:16 AM   Dressing Status Clean;Dry; Intact 10/19/2017  2:00 PM   Number of days: 6     Percent of Wound/Ulcer Debrided: 100%    Total Surface Area Debrided:  4 sq cm     Diabetic/Pressure/Non Pressure Ulcers only:  Ulcer: Diabetic ulcer, fat layer exposed      Estimated Blood Loss:  Minimal    Hemostasis Achieved:  by pressure    Procedural Pain:  0  / 10     Post Procedural Pain:  0 / 10     Response to treatment:  Well tolerated by patient. MediHoney Paste/Gel [] Hydrogel      [] Santyl with Moisten saline gauze    [] Bactroban/Mupirocin [] Polysporin  [x] Other: Linda   Pack wound loosely with  [] Iodoform   [] Plain Packing  [] Other      [x] Cover and Secure with:     [x] Gauze [] ABD [] Stretch bandage roll [x] Kerlix   [] Coban [] Ace Wrap [] Cover Roll Tape    [] Other:    Avoid contact of tape with skin. [x] Change dressing: [x] Daily    [] Every Other Day [] Three times per week   [] Once a week [] Do Not Change Dressing   [] Other:    Dressings:           Wound Location:Left 2nd & 3rd toe incision line     [x] Apply Primary Dressing to wound:       [] Foam/Foam with Border  [] Mepitel/Non-adherent/Xeroform   [] Alginate with Silver    [] Alginate   [] Collagen [] Collagen with Silver     [] Hydrocolloid  [] Hydrafera Blue moistened with saline   [] MediHoney Paste/Gel [] Hydrogel      [] Santyl with Moisten saline gauze   [] Bactroban/Mupirocin [] Polysporin  [x] Other: Paint with betadine   Pack wound loosely with  [] Iodoform   [] Plain Packing  [] Other      [x] Cover and Secure with:     [x] Gauze [] ABD [] Stretch bandage roll [x] Kerlix   [] Coban [] Ace Wrap [] Cover Roll Tape    [] Other:    Avoid contact of tape with skin. [] Change dressing:  [] Daily    [] Every Other Day [] Three times per week   [] Once a week [x] Do Not Change Dressing   [] Other:                     Off-Loading:   [x] Off-loading when: [x] walking  [] in bed [] sitting    [] Total non-weight bearing:  [] Right Leg  [] Left Leg     [] Partial weight bearing:   [] Right Leg  [] Left Leg     [x] Assistive Device: [] Walker [] Crutches  [] Wheelchair [] Roll About   [] Surgical shoe/Darco    [] Podus Boot(s)   [] Prevalon Boot(s)  [] Chung Early    [x]/CAM Boot to right foot [] Other:       Dietary:  Important dietary reminders:  1. Increase Protein intake (i.e. Lean meats, fish, eggs, legumes, and yogurt)  2. No added salt  3.  If diabetic, follow a diabetic diet and check glucose prior to meals or as instructed by your physician. Return Appointment:  [] Wound and dressing supply provider:   [] ECF or Home Healthcare:  [] Nurse visit:    [x] Return Appointment: With Dr Mariella Guajardo  in  [facility])    [] Ordered tests:       Consults: [] Weight Management [] Diabetes Education [] Vascular Surgery  [] Endocrinology [] Nutrition Counseling  [] Lymphedema Therapy     Your nurse  is:  Shara     Electronically signed by Susan Loera RN on 10/24/2017 at [address]: Should you experience any significant changes in your wound(s) or have questions about your wound care, please contact the [address] [place] [address] at [phone] Monday and Wednesday 8:00 am - 2:00 pm and Tuesday, Thursday and Friday from 8:00 am - 4:30 pm.   If you need help with your wound outside these hours and cannot wait until we are again available, contact your PCP or go to the hospital emergency room. PLEASE NOTE: IF YOU ARE UNABLE TO OBTAIN WOUND SUPPLIES, CONTINUE TO USE THE SUPPLIES YOU HAVE AVAILABLE UNTIL YOU ARE ABLE TO REACH US. IT IS MOST IMPORTANT TO KEEP THE WOUND COVERED AT ALL TIMES. Physician orders by:     [x] Dr Anila Ramirez [] Dr Claude Schroeder    [] Dr Rafael Andino     [] Dr Dnota Ruby   [] Dr Marcio Nelson  [] Dr Mirtha Nickerson       Physician Signature:__________________________________        The information contained in the After Visit Summary has been reviewed with me, the patient and/or responsible adult, by my health care provider(s). I had the opportunity to ask questions regarding this information.   I have elected to receive;      [] Patient unable to sign Discharge Instructions given to ECF/Transportation/POA      []  After Visit Summary  []  Comprehensive Discharge Instruction          Electronically signed by Anila Ramirez DPM on 10/25/2017 at 10:17 AM

## 2017-11-07 ENCOUNTER — HOSPITAL ENCOUNTER (OUTPATIENT)
Dept: WOUND CARE | Age: 67
Discharge: HOME | End: 2017-11-07
Attending: PODIATRIST | Admitting: PODIATRIST

## 2017-11-07 VITALS
RESPIRATION RATE: 16 BRPM | TEMPERATURE: 97.7 F | HEART RATE: 78 BPM | SYSTOLIC BLOOD PRESSURE: 139 MMHG | DIASTOLIC BLOOD PRESSURE: 79 MMHG

## 2017-11-07 RX ORDER — AMOXICILLIN AND CLAVULANATE POTASSIUM 875; 125 MG/1; MG/1
1 TABLET, FILM COATED ORAL 2 TIMES DAILY
Qty: 28 TABLET | Refills: 0 | Status: SHIPPED | OUTPATIENT
Start: 2017-11-07 | End: 2017-11-21

## 2017-11-07 RX ORDER — LIDOCAINE HYDROCHLORIDE 40 MG/ML
SOLUTION TOPICAL ONCE
Status: COMPLETED | OUTPATIENT
Start: 2017-11-07 | End: 2017-11-07

## 2017-11-07 RX ORDER — HYDROCODONE BITARTRATE AND ACETAMINOPHEN 5; 325 MG/1; MG/1
1 TABLET ORAL EVERY 6 HOURS PRN
Qty: 20 TABLET | Refills: 0 | Status: SHIPPED | OUTPATIENT
Start: 2017-11-07 | End: 2017-12-12

## 2017-11-07 RX ADMIN — LIDOCAINE HYDROCHLORIDE: 40 SOLUTION TOPICAL at 15:38

## 2017-11-07 ASSESSMENT — PAIN DESCRIPTION - ORIENTATION: ORIENTATION: RIGHT

## 2017-11-07 ASSESSMENT — PAIN DESCRIPTION - DESCRIPTORS: DESCRIPTORS: SHARP

## 2017-11-07 ASSESSMENT — PAIN DESCRIPTION - LOCATION: LOCATION: FOOT

## 2017-11-07 ASSESSMENT — PAIN DESCRIPTION - FREQUENCY: FREQUENCY: INTERMITTENT

## 2017-11-07 ASSESSMENT — PAIN DESCRIPTION - PAIN TYPE: TYPE: ACUTE PAIN

## 2017-11-07 ASSESSMENT — PAIN SCALES - GENERAL: PAINLEVEL_OUTOF10: 7

## 2017-11-10 NOTE — PROGRESS NOTES
Depth (cm)  0.1 11/7/2017  3:30 PM   Calculated Wound Size (cm^2) (l*w) 7.28 cm^2 11/7/2017  3:30 PM   Change in Wound Size % (l*w) -57.58 11/7/2017  3:30 PM   Tunneling Position ___ O'Clock 0 11/7/2017  3:30 PM   Undermining Starts ___ O'Clock 0 11/7/2017  3:30 PM   Undermining Ends___ O'Clock 0 10/24/2017  3:47 PM   Undermining Maxium Distance (cm) 0 11/7/2017  3:30 PM   Wound Assessment Black; Yellow;Red 11/7/2017  3:30 PM   Margins Defined edges 11/7/2017  3:30 PM   Candida-wound Assessment Bria Abi; White 11/7/2017  3:30 PM   Non-staged Wound Description Full thickness 11/7/2017  3:30 PM   [place]%Wound Bed 70 10/17/2017  3:08 PM   Red%Wound Bed 70 11/7/2017  3:30 PM   Yellow%Wound Bed 10 11/7/2017  3:30 PM   Black%Wound Bed 20 11/7/2017  3:30 PM   Drainage Amount Moderate 11/7/2017  3:30 PM   Drainage Description Yellow;Brown 11/7/2017  3:30 PM   Odor None 11/7/2017  3:30 PM   Op First Treatment Date 05/23/17 8/22/2017  2:55 PM   Number of days: 170       Incision 10/19/17 Foot Left (Active)   Wound Assessment Clean;Dry; Intact 11/7/2017  3:30 PM   Candida-wound Assessment Dry 11/7/2017  3:30 PM   Closure Sutures 11/7/2017  3:30 PM   Drainage Amount Small 10/24/2017  3:47 PM   Drainage Description Other (Comment) 10/24/2017  3:47 PM   Odor None 10/24/2017  3:47 PM   Dressing/Treatment Other (Comment) 10/24/2017  3:47 PM   Dressing Changed Changed/New 10/19/2017  9:16 AM   Dressing Status Clean;Dry; Intact 10/19/2017  2:00 PM   Number of days: 22   Percent of Wound/Ulcer Debrided: 100%    Total Surface Area Debrided:  9 sq cm     Diabetic/Pressure/Non Pressure Ulcers only:  Ulcer: Diabetic ulcer, fat layer exposed      Estimated Blood Loss:  Minimal    Hemostasis Achieved:  by pressure    Procedural Pain:  0  / 10     Post Procedural Pain:  0 / 10     Response to treatment:  Well tolerated by patient. Plan:   E/M x 30 minutes. Culture was taken from the wound. Patient will be sent for blood work to evaluate for OM. Border  [] Mepitel/Non-adherent/Xeroform   [] Alginate with Silver    [] Alginate   [] Collagen [] Collagen with Silver     [] Hydrocolloid  [] Hydrafera Blue moistened with saline   [] MediHoney Paste/Gel [] Hydrogel      [] Santyl with Moisten saline gauze    [] Bactroban/Mupirocin [] Polysporin  [x] Other: Moist Linda   Pack wound loosely with  [] Iodoform   [] Plain Packing  [] Other      [x] Cover and Secure with:     [x] Gauze [] ABD [] Stretch bandage roll [x] Kerlix   [] Coban [] Ace Wrap [] Cover Roll Tape    [] Other:    Avoid contact of tape with skin. [x] Change dressing: [x] Daily    [] Every Other Day [] Three times per week   [] Once a week [] Do Not Change Dressing   [] Other:                                  Dietary:  Important dietary reminders:  1. Increase Protein intake (i.e. Lean meats, fish, eggs, legumes, and yogurt)  2. No added salt  3. If diabetic, follow a diabetic diet and check glucose prior to meals or as instructed by your physician. Return Appointment:  [] Wound and dressing supply provider:   [] ECF or Home Healthcare:  [] Nurse visit:    [x] Return Appointment: With Dr Renée Shah  in  [address] [place] [address])    [] Ordered tests:       Consults: [] Weight Management [] Diabetes Education [] Vascular Surgery  [] Endocrinology [] Nutrition Counseling  [] Lymphedema Therapy     Your nurse  is:  Shara     Electronically signed by Lynne Plunkett RN on 11/7/2017 at 3:53 PM     [address]: Should you experience any significant changes in your wound(s) or have questions about your wound care, please contact the [address] [place] [address] at [phone] Monday and Wednesday 8:00 am - 2:00 pm and Tuesday, Thursday and Friday from 8:00 am - 4:30 pm.   If you need help with your wound outside these hours and cannot wait until we are again available, contact your PCP or go to the hospital emergency room.      PLEASE NOTE: IF YOU ARE UNABLE TO OBTAIN WOUND SUPPLIES, CONTINUE TO USE THE SUPPLIES YOU HAVE AVAILABLE UNTIL YOU ARE ABLE TO REACH US. IT IS MOST IMPORTANT TO KEEP THE WOUND COVERED AT ALL TIMES. Physician orders by:     [] Dr Silvia Moise [x] Dr Franklin Alexander    [] Dr Estefani Christensen     [] Dr Ld Munguia   [] Dr Jennie Joiner  [] Dr Violeta Raymond       Physician Signature:__________________________________        The information contained in the After Visit Summary has been reviewed with me, the patient and/or responsible adult, by my health care provider(s). I had the opportunity to ask questions regarding this information.   I have elected to receive;      [] Patient unable to sign Discharge Instructions given to ECF/Transportation/POA      []  After Visit Summary  []  Comprehensive Discharge Instruction          Electronically signed by Silvia Moise DPM on 11/10/2017 at 8:16 AM

## 2017-11-14 ENCOUNTER — HOSPITAL ENCOUNTER (OUTPATIENT)
Dept: WOUND CARE | Age: 67
Discharge: HOME | End: 2017-11-14
Attending: PODIATRIST | Admitting: PODIATRIST

## 2017-11-14 VITALS
SYSTOLIC BLOOD PRESSURE: 147 MMHG | TEMPERATURE: 98.2 F | RESPIRATION RATE: 16 BRPM | HEART RATE: 85 BPM | DIASTOLIC BLOOD PRESSURE: 71 MMHG

## 2017-11-14 RX ORDER — LIDOCAINE HYDROCHLORIDE 40 MG/ML
SOLUTION TOPICAL ONCE
Status: COMPLETED | OUTPATIENT
Start: 2017-11-14 | End: 2017-11-14

## 2017-11-14 RX ADMIN — LIDOCAINE HYDROCHLORIDE: 40 SOLUTION TOPICAL at 14:45

## 2017-11-14 ASSESSMENT — PAIN DESCRIPTION - DESCRIPTORS: DESCRIPTORS: SHARP

## 2017-11-14 ASSESSMENT — PAIN SCALES - GENERAL: PAINLEVEL_OUTOF10: 7

## 2017-11-14 ASSESSMENT — PAIN DESCRIPTION - ORIENTATION: ORIENTATION: RIGHT

## 2017-11-14 ASSESSMENT — PAIN DESCRIPTION - LOCATION: LOCATION: FOOT

## 2017-11-14 ASSESSMENT — PAIN DESCRIPTION - PAIN TYPE: TYPE: ACUTE PAIN

## 2017-11-14 NOTE — PROGRESS NOTES
[address] [facility]  Progress Note and Procedure Note      Evelyn Santos  MEDICAL RECORD NUMBER:  [id number]  AGE: 75 y.o. GENDER: male  : 1950  EPISODE DATE:  2017    Subjective:       Chief Complaint   Patient presents with    Wound Check         HISTORY of PRESENT ILLNESS HPI     Evelyn Santos is a 75 y.o. male who presents today for wound evaluation. History of Wound Context: Recent admission to [facility], [facility]. for LEFT digital amputation, now presents for RIGHT mid arch deeply penetrating wound  Wound Pain Timing/Severity: none  Quality of pain: N/A  Severity:  0 / 10   Modifying Factors: None  Associated Signs/Symptoms: edema, drainage and numbness    Ulcer Identification:  Ulcer Type: diabetic and neuropathic  Contributing Factors: edema, diabetes, poor glucose control, chronic pressure, decreased mobility, shear force and arterial insufficiency          PAST MEDICAL HISTORY        Diagnosis Date    CAD (coronary artery disease)     minor, per cardiac cath    Diabetes mellitus ([address]     Diabetic infection of left foot ([address]     w/osteomyelitis    H/O cocaine abuse 2015    History of CVA with residual deficit 2012    Hypertension     MRSA (methicillin resistant staph aureus) culture positive 2017    toe    Non-healing wound of lower extremity 2016    L great toe    Non-ischemic cardiomyopathy ([address] 2012    per cardiac cath    PAD (peripheral artery disease) ([address] 2016    L PIPER - 0.6, unable to obtain readings on R    Peripheral neuropathy (HCC)     Seizure, convulsion ([address] 2016    Dr. Latonya Breen. Xochitl - due to scar from old L frontal infarction    Unspecified cerebral artery occlusion with cerebral infarction 2012       PAST SURGICAL HISTORY    Past Surgical History:   Procedure Laterality Date    CARDIAC CATHETERIZATION  2012    Dr. Desirae Myrick Right     FOOT SURGERY Left 10/19/2017 file prior to encounter. REVIEW OF SYSTEMS    Pertinent items are noted in HPI. Objective:      BP (!) 147/71   Pulse 85   Temp 98.2 °F (36.8 °C) (Oral)   Resp 16     Wt Readings from Last 3 Encounters:   10/17/17 212 lb 1.3 oz (96.2 kg)   07/20/17 182 lb (82.6 kg)   07/12/17 184 lb 12.8 oz (83.8 kg)       PHYSICAL EXAM    General Appearance: alert and oriented to person, place and time, well-developed and well-nourished, in no acute distress          Assessment:     Patient Active Problem List   Diagnosis    Chest pain    Leg edema    Systolic and diastolic CHF, chronic ([address]    DM (diabetes mellitus) ([address]    Essential hypertension    History of CVA with residual deficit    Cellulitis of foot    Diabetic foot infection ([address]    MADYSON (acute kidney injury) ([address]    H/O cocaine abuse    New onset seizure ([address]    Chronic generalized pain    PAD (peripheral artery disease) ([address]    Seizure, convulsion ([address]    Diabetic infection of left foot ([address]    Non-ischemic cardiomyopathy ([address]    Non-healing wound of lower extremity    Osteomyelitis of left foot ([address]    Ischemic ulcer diabetic foot ([address]    Seizure disorder ([address]    Elevated troponin    CKD (chronic kidney disease) stage 3, GFR 30-59 ml/min    Toe osteomyelitis, left ([address]    Critical lower limb ischemia    S/P angioplasty with stent         Procedure Note  Indications:  Based on my examination of this patient's wound(s)/ulcer(s) today, debridement is required to promote healing and evaluate the extent healing. Performed by: Maebelle Nissen, DPM    Consent obtained: Yes    Time out taken:  Yes    Pain Control: Anesthetic  Anesthetic: 4% Topical Xylocaine       Debridement:Excisional Debridement    Using #15 blade scalpel, scissors and forceps the wound(s)/ulcer(s) was/were sharply debrided down through and including the removal of muscle/fascia.     Devitalized Tissue Debrided:  fibrin, necrotic/eschar and callus    Pre Debridement Measurements:  Are located in the [place]  Documentation Flow Sheet    Wound/Ulcer #: 3    Post Debridement Measurements:  Wound/Ulcer Descriptions are Pre Debridement except measurements:    Wound 05/23/17 Foot Right;Plantar;Lateral #3- diabetic (Active)   Wound Type Wound 11/14/2017  2:36 PM   Wound Diabetic Porter 2 11/7/2017  3:30 PM   Dressing Status Clean;Dry; Intact 10/20/2017  9:39 AM   Dressing/Treatment Other (Comment) 10/24/2017  3:47 PM   Wound Cleansed Rinsed/Irrigated with saline 11/14/2017  2:36 PM   Wound Length (cm) 4.7 cm 11/14/2017  2:36 PM   Wound Width (cm) 3.6 cm 11/14/2017  2:36 PM   Wound Depth (cm)  0.4 11/14/2017  2:36 PM   Calculated Wound Size (cm^2) (l*w) 16.92 cm^2 11/14/2017  2:36 PM   Change in Wound Size % (l*w) -266.23 11/14/2017  2:36 PM   Tunneling Position ___ O'Clock 0 11/7/2017  3:30 PM   Undermining Starts ___ O'Clock 0 11/7/2017  3:30 PM   Undermining Ends___ O'Clock 0 10/24/2017  3:47 PM   Undermining Maxium Distance (cm) 0 11/7/2017  3:30 PM   Wound Assessment Black; Red;Slough; Yellow 11/14/2017  2:36 PM   Margins Defined edges 11/14/2017  2:36 PM   [place]-wound Assessment Brown;Pink 11/14/2017  2:36 PM   Non-staged Wound Description Full thickness 11/14/2017  2:36 PM   [place]%Wound Bed 70 10/17/2017  3:08 PM   Red%Wound Bed 40 11/14/2017  2:36 PM   Yellow%Wound Bed 20 11/14/2017  2:36 PM   Black%Wound Bed 30 11/14/2017  2:36 PM   Drainage Amount Moderate 11/14/2017  2:36 PM   Drainage Description Brown;Yellow 11/14/2017  2:36 PM   Odor None 11/14/2017  2:36 PM   Op First Treatment Date 05/23/17 8/22/2017  2:55 PM   Number of days: 175       Percent of Wound Debrided: 100%    Total Surface Area Debrided:  16.92 sq cm     Diabetic/Pressure/Non Pressure Ulcers:  Ulcer: Diabetic ulcer, muscle necrosis        Bleeding:  Minimal    Hemostasis Achieved:  by pressure    Procedural Pain:  0  / 10     Post Procedural Pain:  0 / 10     Response to treatment:  Well tolerated by patient. Plan:   Wound of LEFT forefoot digital amputation well healed from Oct 20th, but wound of RIGHT midfoot concerning for depth that probes to joint capsule, and size of nearly 20cm. Santyl applied after aggressive debridement    Treatment Note please see attached Discharge Instructions    In my professional opinion this patient would benefit from HBO Therapy: Yes       Patient is to return to wound care center in:  1 week(s)    Written patient dismissal instructions given to patient and signed by patient or POA. Discharge [address] [facility] Physician Orders and Discharge Instructions  The Venita Herrera [address] [facility], [facility] [address] [facility]  Telephone: [phone]    NAME:  Reema Krishnamurthy  YOB: 1950  MEDICAL RECORD NUMBER:  [id number]  DATE:  11/14/2017    Wash hands with soap and water prior to and after every dressing change. Wound Cleansing:   · Do not scrub or use excessive force. · With each dressing change, rinse wounds with 0.9% Saline. (May use wound wash or soft contact solution. Both can be purchased at a local drug store). · If unable to obtain saline, may use a gentle soap and water. · Keep wounds dry in the shower unless otherwise instructed by the physician. Topical Treatments:  Do not apply lotions, creams, or ointments to wound bed unless directed. [] Apply moisturizing lotion to skin surrounding the wound prior to dressing change.  [] Apply antifungal ointment to skin surrounding the wound prior to dressing change.  [] Apply thin film of moisture barrier ointment to skin immediately around wound.   [] Other:      Dressings:           Wound Location: Right Plantar Foot   [x] Apply Primary Dressing to wound:       [] Foam/Foam with Border  [] Mepitel/Non-adherent/Xeroform   [] Alginate with Silver    [] Alginate   [] Collagen [] Collagen with Silver     [] Hydrocolloid  []

## 2017-11-21 ENCOUNTER — HOSPITAL ENCOUNTER (OUTPATIENT)
Dept: WOUND CARE | Age: 67
Discharge: HOME | End: 2017-11-21
Attending: PODIATRIST | Admitting: PODIATRIST

## 2017-11-21 VITALS
HEART RATE: 88 BPM | TEMPERATURE: 97.8 F | RESPIRATION RATE: 16 BRPM | DIASTOLIC BLOOD PRESSURE: 84 MMHG | SYSTOLIC BLOOD PRESSURE: 160 MMHG

## 2017-11-21 LAB
GLUCOSE BLD-MCNC: 288 MG/DL (ref 70–99)
PERFORMED ON: ABNORMAL

## 2017-11-21 RX ORDER — LIDOCAINE HYDROCHLORIDE 40 MG/ML
SOLUTION TOPICAL ONCE
Status: COMPLETED | OUTPATIENT
Start: 2017-11-21 | End: 2017-11-21

## 2017-11-21 RX ADMIN — LIDOCAINE HYDROCHLORIDE 2.5 ML: 40 SOLUTION TOPICAL at 14:57

## 2017-11-21 ASSESSMENT — PAIN DESCRIPTION - PAIN TYPE: TYPE: ACUTE PAIN

## 2017-11-21 ASSESSMENT — PAIN DESCRIPTION - FREQUENCY: FREQUENCY: INTERMITTENT

## 2017-11-21 ASSESSMENT — PAIN DESCRIPTION - ONSET: ONSET: ON-GOING

## 2017-11-21 ASSESSMENT — PAIN DESCRIPTION - DESCRIPTORS: DESCRIPTORS: ACHING

## 2017-11-21 ASSESSMENT — PAIN SCALES - GENERAL: PAINLEVEL_OUTOF10: 8

## 2017-11-21 ASSESSMENT — PAIN DESCRIPTION - ORIENTATION: ORIENTATION: RIGHT

## 2017-11-21 ASSESSMENT — PAIN DESCRIPTION - LOCATION: LOCATION: FOOT

## 2017-11-21 NOTE — PROGRESS NOTES
Date    CARDIAC CATHETERIZATION  01/31/2012    Dr. Shannan Mello Left 10/19/2017     INCISION AND DRAINAGE WITH AMPUTATION OF 2ND DIGIT LEFT FOOT    FOOT TENDON SURGERY Left 05/24/2013    , [facility] - I&D to antebellum cortex w/wound closure, perineal tendon transfer    KNEE ARTHROSCOPY      OTHER SURGICAL HISTORY Left 05/17/2017    INCISION AND DRAINAGE, AMPUTATION OF HALLUX LEFT FOOT    TOE AMPUTATION Left 05/21/2013    Dr. Silvia Rosenbaum, [facility] - I&D to bone cortex w/amputation of 5th metatarsal       FAMILY HISTORY    History reviewed. No pertinent family history. SOCIAL HISTORY    Social History   Substance Use Topics    Smoking status: Former Smoker     Types: Cigarettes     Quit date: 2/13/1993    Smokeless tobacco: Never Used      Comment: 2/13/2013 smokes a cigarette or cigar once or twice a year    Alcohol use Yes      Comment: 2/13/2013, drinks every so often (cold beer, or wine)       ALLERGIES    No Known Allergies    MEDICATIONS    Current Outpatient Prescriptions on File Prior to Encounter   Medication Sig Dispense Refill    amLODIPine (NORVASC) 5 MG tablet Take 1 tablet by mouth daily 90 tablet 3    amoxicillin-clavulanate (AUGMENTIN) 875-125 MG per tablet Take 1 tablet by mouth 2 times daily for 14 days 28 tablet 0    HYDROcodone-acetaminophen (NORCO) 5-325 MG per tablet Take 1 tablet by mouth every 6 hours as needed for Pain .  20 tablet 0    Meloxicam 10 MG CAPS Take by mouth      carvedilol (COREG) 6.25 MG tablet Take 1 tablet by mouth 2 times daily (with meals) 180 tablet 3    aspirin 81 MG chewable tablet Take 1 tablet by mouth daily 90 tablet 3    gabapentin (NEURONTIN) 600 MG tablet Take 600 mg by mouth 2 times daily      busPIRone (BUSPAR) 10 MG tablet Take 7.5 mg by mouth 2 times daily      losartan (COZAAR) 25 MG tablet Take 25 mg by mouth daily      atorvastatin (LIPITOR) 10 MG tablet Take 10 mg by mouth nightly      CVA with residual deficit I69.30    Cellulitis of foot L03.119    Diabetic foot infection ([facility]) E11.69, L08.9    MADYSON (acute kidney injury) [facility] N17.9    H/O cocaine abuse Z87.898    New onset seizure ([facility] R56.9    Chronic generalized pain R52, G89.29    PAD (peripheral artery disease) ([facility]) I73.9    Seizure, convulsion ([facility]) R56.9    Diabetic infection of left foot ([facility]) E11.69, L08.9    Non-ischemic cardiomyopathy ([facility]) I42.8    Non-healing wound of lower extremity S81.809A    Osteomyelitis of left foot ([facility]) M86.9    Ischemic ulcer diabetic foot ([facility] E11.621, L97.509    Seizure disorder ([facility]) G40.909    Elevated troponin R74.8    CKD (chronic kidney disease) stage 3, GFR 30-59 ml/min N18.3    Toe osteomyelitis, left ([facility]) M86.9    Critical lower limb ischemia I99.8    S/P angioplasty with stent Z95.9        Procedure Note  Indications:  Based on my examination of this patient's wound(s)/ulcer(s) today, debridement is required to promote healing and evaluate the wound base. Performed by: Usman García DPM    Consent obtained:  Yes    Time out taken:  Yes    Pain Control: Anesthetic  Anesthetic: 4% Lidocaine Liquid Topical       Debridement:Excisional Debridement    Using curette, #15 blade scalpel and forceps the wound(s)/ulcer(s) was/were sharply debrided down through and including the removal of epidermis, dermis, subcutaneous tissue and muscle/fascia.         Devitalized Tissue Debrided:  fibrin and slough    Pre Debridement Measurements:  Are located in the [place]  Documentation Flow Sheet    Wound/Ulcer #: 3    Post Debridement Measurements:  Wound/Ulcer Descriptions are Pre Debridement except measurements:        Wound 05/23/17 Foot Right;Plantar;Lateral #3- diabetic (Active)   Wound Type Wound 11/21/2017  2:34 PM   Wound Diabetic Porter 2 11/7/2017  3:30 PM   Dressing/Treatment Other (Comment) 10/24/2017  3:47 PM   Wound Cleansed Rinsed/Irrigated with saline 11/21/2017  2:34 PM removed from left toe amp site. Well healed. Treatment Note please see attached Discharge Instructions    In my professional opinion this patient would benefit from HBO Therapy: No    Written patient dismissal instructions given to patient and signed by patient or POA. RTC 1 week. Discharge [address] [facility] Physician Orders and Discharge Instructions  Alejandro Camachojeremyjeromecaren [address], [address]  Telephone: [phone]    NAME:  Mayra Torres  YOB: 1950  MEDICAL RECORD NUMBER:  [id number]  DATE:  11/21/2017    Wash hands with soap and water prior to and after every dressing change. Wound Cleansing:   · Do not scrub or use excessive force. · With each dressing change, rinse wounds with 0.9% Saline. (May use wound wash or soft contact solution. Both can be purchased at a local drug store). · If unable to obtain saline, may use a gentle soap and water. · Keep wounds dry in the shower unless otherwise instructed by the physician. Topical Treatments:  Do not apply lotions, creams, or ointments to wound bed unless directed. [] Apply moisturizing lotion to skin surrounding the wound prior to dressing change.  [] Apply antifungal ointment to skin surrounding the wound prior to dressing change.  [] Apply thin film of moisture barrier ointment to skin immediately around wound.   [] Other:      Dressings:           Wound Location: Right Plantar Foot     [x] Apply Primary Dressing to wound:       [] Foam/Foam with Border  [] Mepitel/Non-adherent/Xeroform   [] Alginate with Silver    [] Alginate   [] Collagen [] Collagen with Silver     [] Hydrocolloid  [] Hydrafera Blue moistened with saline   [] MediHoney Paste/Gel [] Hydrogel      [] Santyl with Moisten saline gauze    [] Bactroban/Mupirocin [] Polysporin  [] Other:      Pack wound loosely with  [] Iodoform   [] Plain Packing  [] Other      [x] SUPPLIES YOU HAVE AVAILABLE UNTIL YOU ARE ABLE TO REACH US. IT IS MOST IMPORTANT TO KEEP THE WOUND COVERED AT ALL TIMES. Physician orders by:     [x] Dr Shawn Gomes [] Dr Sade Reardon    [] Dr Bruce Jauregui     [] Dr Zaki Schuster   [] Dr Harman Rosas  [] Dr Tatiana Ocampo       Physician Signature:__________________________________        The information contained in the After Visit Summary has been reviewed with me, the patient and/or responsible adult, by my health care provider(s). I had the opportunity to ask questions regarding this information.   I have elected to receive;      [] Patient unable to sign Discharge Instructions given to ECF/Transportation/POA      []  After Visit Summary  []  Comprehensive Discharge Instruction          Electronically signed by Shawn Gomes DPM on 11/21/2017 at 4:25 PM

## 2017-11-28 ENCOUNTER — HOSPITAL ENCOUNTER (OUTPATIENT)
Dept: WOUND CARE | Age: 67
Discharge: HOME | End: 2017-11-28
Attending: PODIATRIST | Admitting: PODIATRIST

## 2017-11-28 VITALS
SYSTOLIC BLOOD PRESSURE: 156 MMHG | DIASTOLIC BLOOD PRESSURE: 85 MMHG | TEMPERATURE: 97.4 F | HEART RATE: 78 BPM | RESPIRATION RATE: 16 BRPM

## 2017-11-28 LAB
GLUCOSE BLD-MCNC: 200 MG/DL (ref 70–99)
PERFORMED ON: ABNORMAL

## 2017-11-28 RX ORDER — LIDOCAINE HYDROCHLORIDE 40 MG/ML
2.5 SOLUTION TOPICAL ONCE
Status: COMPLETED | OUTPATIENT
Start: 2017-11-28 | End: 2017-11-28

## 2017-11-28 RX ADMIN — LIDOCAINE HYDROCHLORIDE 2.5 ML: 40 SOLUTION TOPICAL at 14:45

## 2017-11-28 NOTE — PROGRESS NOTES
Date    CARDIAC CATHETERIZATION  01/31/2012    Dr. Fátima Neves Left 10/19/2017     INCISION AND DRAINAGE WITH AMPUTATION OF 2ND DIGIT LEFT FOOT    FOOT TENDON SURGERY Left 05/24/2013    , DPM - I&D to antebellum cortex w/wound closure, perineal tendon transfer    KNEE ARTHROSCOPY      OTHER SURGICAL HISTORY Left 05/17/2017    INCISION AND DRAINAGE, AMPUTATION OF HALLUX LEFT FOOT    TOE AMPUTATION Left 05/21/2013    Dr. Deanne Jennings, DPSERA - I&D to bone cortex w/amputation of 5th metatarsal       FAMILY HISTORY    No family history on file. SOCIAL HISTORY    Social History   Substance Use Topics    Smoking status: Former Smoker     Types: Cigarettes     Quit date: 2/13/1993    Smokeless tobacco: Never Used      Comment: 2/13/2013 smokes a cigarette or cigar once or twice a year    Alcohol use Yes      Comment: 2/13/2013, drinks every so often (cold beer, or wine)       ALLERGIES    No Known Allergies    MEDICATIONS    Current Outpatient Prescriptions on File Prior to Encounter   Medication Sig Dispense Refill    amLODIPine (NORVASC) 5 MG tablet Take 1 tablet by mouth daily 90 tablet 3    HYDROcodone-acetaminophen (NORCO) 5-325 MG per tablet Take 1 tablet by mouth every 6 hours as needed for Pain .  20 tablet 0    Meloxicam 10 MG CAPS Take by mouth      carvedilol (COREG) 6.25 MG tablet Take 1 tablet by mouth 2 times daily (with meals) 180 tablet 3    aspirin 81 MG chewable tablet Take 1 tablet by mouth daily 90 tablet 3    gabapentin (NEURONTIN) 600 MG tablet Take 600 mg by mouth 2 times daily      busPIRone (BUSPAR) 10 MG tablet Take 7.5 mg by mouth 2 times daily      losartan (COZAAR) 25 MG tablet Take 25 mg by mouth daily      atorvastatin (LIPITOR) 10 MG tablet Take 10 mg by mouth nightly      insulin glargine (LANTUS) 100 UNIT/ML injection vial Inject 30 Units into the skin nightly        No current facility-administered medications on file prior to encounter. REVIEW OF SYSTEMS    Pertinent items are noted in HPI. Review of Systems: A 12 point review of symptoms is unremarkable with the exception of the chief complaint. Patient specifically denies nausea, fever, vomiting, chills, shortness of breath, chest pain, abdominal pain, constipation or difficulty urinating. Objective:      BP (!) 156/85   Pulse 78   Temp 97.4 °F (36.3 °C) (Oral)   Resp 16     Wt Readings from Last 3 Encounters:   10/17/17 212 lb 1.3 oz (96.2 kg)   07/20/17 182 lb (82.6 kg)   07/12/17 184 lb 12.8 oz (83.8 kg)       PHYSICAL EXAM    Patient presents today without his walker. DP/PT pulses nonplapable bilaterally. TMA noted on the right. Great toe ad 5th digit amputated on the left. Digits are pink and warm to the touch. Hair growth is absent. Mild edema noted. No calf pain with palpation noted. Epicritic sensation is grossly absent bilaterally. Negative clonus and babinski reflex is down going. Incision on the left great toe amputation site is healed. No signs of infection. Wound noted on the distal lateral  Plantar aspect if the right TMA site. Wound has fibrotic and nonviable tissue that extends down through and includes the subcutaneous tissue/muscle/fascia. After debridement the wound has a granular base. There is no surrounding erythema, edema, warmth or malodor noted. The wound does not probe or track to bone. The wound is significantly larger. Incision noted on the left foot is well approximated and coapted. No signs of infection. No signs of dehiscence.    .   Assessment:      Patient Active Problem List   Diagnosis Code    Chest pain R07.9    Leg edema N56.2    Systolic and diastolic CHF, chronic (HCC) I50.42    DM (diabetes mellitus) ([address] E11.9    Essential hypertension I10    History of CVA with residual deficit I69.30    Cellulitis of foot L03.119    Diabetic foot infection ([address] E11.69, L08.9    MADYSON (acute kidney injury) ([facility] N17.9    H/O cocaine abuse Z87.898    New onset seizure ([facility] R56.9    Chronic generalized pain R52, G89.29    PAD (peripheral artery disease) ([facility]) I73.9    Seizure, convulsion ([facility]) R56.9    Diabetic infection of left foot ([facility]) E11.69, L08.9    Non-ischemic cardiomyopathy ([facility]) I42.8    Non-healing wound of lower extremity S81.809A    Osteomyelitis of left foot ([facility]) M86.9    Ischemic ulcer diabetic foot ([facility] E11.621, L97.509    Seizure disorder ([facility] G40.909    Elevated troponin R74.8    CKD (chronic kidney disease) stage 3, GFR 30-59 ml/min N18.3    Toe osteomyelitis, left ([facility]) M86.9    Critical lower limb ischemia I99.8    S/P angioplasty with stent Z95.9        Procedure Note  Indications:  Based on my examination of this patient's wound(s)/ulcer(s) today, debridement is required to promote healing and evaluate the wound base. Performed by: Bony Leslie DPM    Consent obtained:  Yes    Time out taken:  Yes    Pain Control: Anesthetic  Anesthetic: 4% Lidocaine Liquid Topical       Debridement:Excisional Debridement    Using curette, #15 blade scalpel and forceps the wound(s)/ulcer(s) was/were sharply debrided down through and including the removal of epidermis, dermis, subcutaneous tissue and muscle/fascia.         Devitalized Tissue Debrided:  fibrin and slough    Pre Debridement Measurements:  Are located in the [facility]  Documentation Flow Sheet    Wound/Ulcer #: 3    Post Debridement Measurements:  Wound/Ulcer Descriptions are Pre Debridement except measurements:      Wound 05/23/17 Foot Right;Plantar;Lateral #3- diabetic (Active)   Wound Type Wound 11/28/2017  2:41 PM   Wound Diabetic Porter 2 11/7/2017  3:30 PM   Dressing/Treatment Other (Comment) 10/24/2017  3:47 PM   Wound Cleansed Rinsed/Irrigated with saline 11/28/2017  2:41 PM   Wound Length (cm) 5 cm 11/28/2017  2:41 PM   Wound Width (cm) 4 cm 11/28/2017  2:41 PM   Wound Depth (cm)  0.3 11/28/2017  2:41 PM   Calculated Wound Size (cm^2) (l*w) 17.39 cm^2 11/28/2017  2:41 PM   Change in Wound Size % (l*w) -276.41 11/28/2017  2:41 PM   Tunneling Position ___ O'Clock 0 11/7/2017  3:30 PM   Undermining Starts ___ O'Clock 1000 11/28/2017  2:41 PM   Undermining Ends___ O'Clock 1100 11/28/2017  2:41 PM   Undermining Maxium Distance (cm) 0.2 11/28/2017  2:41 PM   Wound Assessment Pink;Yellow 11/28/2017  2:41 PM   Drainage Amount Moderate 11/28/2017  2:41 PM   Drainage Description Brown;Yellow 11/28/2017  2:41 PM   Odor Mild 11/28/2017  2:41 PM   Margins Defined edges 11/28/2017  2:41 PM   Exposed structure Tendon 11/21/2017  2:34 PM   Candida-wound Assessment Maceration 11/28/2017  2:41 PM   Non-staged Wound Description Full thickness 11/28/2017  2:41 PM   Tea%Wound Bed 70 11/28/2017  2:41 PM   Red%Wound Bed 10 11/21/2017  2:34 PM   Yellow%Wound Bed 30 11/28/2017  2:41 PM   Black%Wound Bed 30 11/14/2017  2:36 PM   Op First Treatment Date 05/23/17 8/22/2017  2:55 PM   Number of days: 189     Percent of Wound/Ulcer Debrided: 100%    Total Surface Area Debrided:  20 sq cm     Diabetic/Pressure/Non Pressure Ulcers only:  Ulcer: Diabetic ulcer, fat layer exposed      Estimated Blood Loss:  Minimal    Hemostasis Achieved:  by pressure    Procedural Pain:  0  / 10     Post Procedural Pain:  0 / 10     Response to treatment:  Well tolerated by patient. Plan:   Discussed with patient at length that the wound is not adequately offloaded. He MUST use his walker. Patient relates that he will be better about using his walker. Orders written for local wound care with maximiliano daily. Discussed with the patient at length that he must allow home health care to change his dressing. He relates that he will answer their calls. Offload wound with cast boot and walker. Sutures removed from left toe amp site. Well healed.     Treatment Note please see attached Discharge Instructions    In my professional opinion this patient would benefit from HBO Therapy: No    Written patient dismissal instructions given to patient and signed by patient or POA. RTC 1 week. Discharge [facility] Physician Orders and Discharge Instructions  The [facility] [address], [address] [facility]  Telephone: [phone]    NAME:  Nas Jacome  YOB: 1950  MEDICAL RECORD NUMBER:  [id number]  DATE:  11/28/2017    Wash hands with soap and water prior to and after every dressing change. Wound Cleansing:   · Do not scrub or use excessive force. · With each dressing change, rinse wounds with 0.9% Saline. (May use wound wash or soft contact solution. Both can be purchased at a local drug store). · If unable to obtain saline, may use a gentle soap and water. · Keep wounds dry in the shower unless otherwise instructed by the physician. Topical Treatments:  Do not apply lotions, creams, or ointments to wound bed unless directed. [x] Apply moisturizing lotion to skin surrounding the wound prior to dressing change.  [] Apply antifungal ointment to skin surrounding the wound prior to dressing change.  [] Apply thin film of moisture barrier ointment to skin immediately around wound.   [] Other:      Dressings:           Wound Location: Right Plantar Foot   [x] Apply Primary Dressing to wound:       [] Foam/Foam with Border  [] Mepitel/Non-adherent/Xeroform   [] Alginate with Silver    [] Alginate   [] Collagen [] Collagen with Silver     [] Hydrocolloid  [] Hydrafera Blue moistened with saline   [] MediHoney Paste/Gel [] Hydrogel      [x] Santyl with Moisten saline gauze    [] Bactroban/Mupirocin [] Polysporin  [] Other:      Pack wound loosely with  [] Iodoform   [] Plain Packing  [] Other      [x] Cover and Secure with:     [x] Gauze [] ABD [] Stretch bandage roll [] Kerlix   [] Coban [] Ace Wrap [] Cover Roll Tape    [] Other:    Avoid contact of tape with skin.    [x] Change dressing: [x] Daily    [] Every Other Day [] Three times per week   [] Once a week [] Do Not Change Dressing   [] Other:            :                 Off-Loading:   [] Off-loading when: [] walking  [] in bed [] sitting    [] Total non-weight bearing:  [] Right Leg  [] Left Leg     [] Partial weight bearing:   [] Right Leg  [] Left Leg     [x] Assistive Device: [] Walker [] Crutches  [] Wheelchair [] Roll About   [] Surgical shoe/Darco    [] Podus Boot(s)   [] Prevalon Boot(s)  [] CROW Boot    [x] /CAM Boot [] Other:         Dietary:  Important dietary reminders:  1. Increase Protein intake (i.e. Lean meats, fish, eggs, legumes, and yogurt)  2. No added salt  3. If diabetic, follow a diabetic diet and check glucose prior to meals or as instructed by your physician. Return Appointment:  [] Wound and dressing supply provider:   [] ECF or Home Healthcare:  [] Nurse visit:    [x] Return Appointment: With Dr Gregorio Quiles  in  [facility])    [] Ordered tests:       Consults: [] Weight Management [] Diabetes Education [] Vascular Surgery  [] Endocrinology [] Nutrition Counseling  [] Lymphedema Therapy     Your nurse  is:  Shara     Electronically signed by Ximena Nj RN on 11/28/2017 at [address] Information: Should you experience any significant changes in your wound(s) or have questions about your wound care, please contact the [address] [place] [address] at [phone] Monday and Wednesday 8:00 am - 2:00 pm and Tuesday, Thursday and Friday from 8:00 am - 4:30 pm.   If you need help with your wound outside these hours and cannot wait until we are again available, contact your PCP or go to the hospital emergency room. PLEASE NOTE: IF YOU ARE UNABLE TO OBTAIN WOUND SUPPLIES, CONTINUE TO USE THE SUPPLIES YOU HAVE AVAILABLE UNTIL YOU ARE ABLE TO REACH US. IT IS MOST IMPORTANT TO KEEP THE WOUND COVERED AT ALL TIMES.       Physician orders by:     [x] Dr Braden Hadley []  Jerome Girard    [] Dr Kevin Patrick     [] Dr Faye Haywood   [] Dr Lillian Peña  [] Dr Fareed Hidalgo       Physician Signature:__________________________________        The information contained in the After Visit Summary has been reviewed with me, the patient and/or responsible adult, by my health care provider(s). I had the opportunity to ask questions regarding this information.   I have elected to receive;      [] Patient unable to sign Discharge Instructions given to ECF/Transportation/POA      []  After Visit Summary  []  Comprehensive Discharge Instruction          Electronically signed by Jane Lopez DPM on 11/28/2017 at 4:08 PM

## 2017-12-05 ENCOUNTER — HOSPITAL ENCOUNTER (OUTPATIENT)
Dept: WOUND CARE | Age: 67
Discharge: HOME | End: 2017-12-05
Attending: PODIATRIST | Admitting: PODIATRIST

## 2017-12-05 VITALS
RESPIRATION RATE: 18 BRPM | HEART RATE: 76 BPM | DIASTOLIC BLOOD PRESSURE: 82 MMHG | TEMPERATURE: 97.5 F | SYSTOLIC BLOOD PRESSURE: 168 MMHG

## 2017-12-05 LAB
GLUCOSE BLD-MCNC: 283 MG/DL (ref 70–99)
PERFORMED ON: ABNORMAL

## 2017-12-05 RX ORDER — LIDOCAINE HYDROCHLORIDE 40 MG/ML
2.5 SOLUTION TOPICAL ONCE
Status: COMPLETED | OUTPATIENT
Start: 2017-12-05 | End: 2017-12-05

## 2017-12-05 RX ADMIN — LIDOCAINE HYDROCHLORIDE 2.5 ML: 40 SOLUTION TOPICAL at 15:22

## 2017-12-06 NOTE — PROGRESS NOTES
[address]   Progress Note and Procedure Note      Jacqui Hardy  MEDICAL RECORD NUMBER:  [id number]  AGE: 75 y.o. GENDER: male  : 1950  EPISODE DATE:  2017    Subjective:     Chief Complaint   Patient presents with    Wound Check     f/u right plantar foot         HISTORY of PRESENT ILLNESS HPI     Jacqui Hardy is a 75 y.o. male who presents today for wound/ulcer evaluation. History of Wound Context: right foot wound at the distal aspect of a previous TMA. Patient has recently has his left great toe amputated. He has been having local wound care at home per his home health care nurse. He has been recently revascularized by Dr. Duke Rodrigez. Patient relates that he has been being more compliant with wearing his CAM boot and has been using his walker. Wound/Ulcer Pain Timing/Severity: none  Quality of pain: N/A  Severity:  0 / 10   Modifying Factors: None  Associated Signs/Symptoms: none    Ulcer Identification:  Ulcer Type: arterial, diabetic and pressure  Contributing Factors: diabetes and arterial insufficiency    Wound: N/A        PAST MEDICAL HISTORY        Diagnosis Date    CAD (coronary artery disease) 2012    minor, per cardiac cath    Diabetes mellitus ([address]     Diabetic infection of left foot ([address] 2013    w/osteomyelitis    H/O cocaine abuse 2015    History of CVA with residual deficit 2012    Hypertension     MRSA (methicillin resistant staph aureus) culture positive 2017    toe    Non-healing wound of lower extremity 2016    L great toe    Non-ischemic cardiomyopathy ([address] 2012    per cardiac cath    PAD (peripheral artery disease) ([address] 2016    L PIPER - 0.6, unable to obtain readings on R    Peripheral neuropathy (HCC)     Seizure, convulsion ([address] 2016    Dr. Vaishali Leung - due to scar from old L frontal infarction    Unspecified cerebral artery occlusion with cerebral infarction 2012       PAST SURGICAL HISTORY    Past (acute kidney injury) ([facility] N17.9    H/O cocaine abuse Z87.898    New onset seizure ([facility] R56.9    Chronic generalized pain R52, G89.29    PAD (peripheral artery disease) ([facility]) I73.9    Seizure, convulsion ([facility]) R56.9    Diabetic infection of left foot ([facility]) E11.69, L08.9    Non-ischemic cardiomyopathy ([facility]) I42.8    Non-healing wound of lower extremity S81.809A    Osteomyelitis of left foot ([facility]) M86.9    Ischemic ulcer diabetic foot ([facility] E11.621, L97.509    Seizure disorder ([facility]) G40.909    Elevated troponin R74.8    CKD (chronic kidney disease) stage 3, GFR 30-59 ml/min N18.3    Toe osteomyelitis, left ([facility]) M86.9    Critical lower limb ischemia I99.8    S/P angioplasty with stent Z95.9        Procedure Note  Indications:  Based on my examination of this patient's wound(s)/ulcer(s) today, debridement is required to promote healing and evaluate the wound base. Performed by: Alysia Ren DPM    Consent obtained:  Yes    Time out taken:  Yes    Pain Control: Anesthetic  Anesthetic: 4% Lidocaine Liquid Topical       Debridement:Excisional Debridement    Using curette, #15 blade scalpel and forceps the wound(s)/ulcer(s) was/were sharply debrided down through and including the removal of epidermis, dermis, subcutaneous tissue and muscle/fascia.         Devitalized Tissue Debrided:  fibrin and slough    Pre Debridement Measurements:  Are located in the [facility]  Documentation Flow Sheet    Wound/Ulcer #: 3    Post Debridement Measurements:  Wound/Ulcer Descriptions are Pre Debridement except measurements:    Wound 05/23/17 Foot Right;Plantar;Lateral #3- diabetic (Active)   Wound Type Wound 12/5/2017  3:12 PM   Wound Diabetic Porter 2 11/7/2017  3:30 PM   Dressing/Treatment Dry dressing 12/5/2017  4:06 PM   Wound Cleansed Rinsed/Irrigated with saline 12/5/2017  3:12 PM   Wound Length (cm) 5.1 cm 12/5/2017  3:12 PM   Wound Width (cm) 4 cm 12/5/2017  3:12 PM   Wound Depth (cm)  0.5 12/5/2017  3:12 PM THE WOUND COVERED AT ALL TIMES. Physician orders by:     [x] Dr Hector Yu [] Dr Leopold Mosher    [] Dr Mando Calderon     [] Dr Jose Martinez   [] Dr Joseph Almeida  [] Dr Luca Zhu       Physician Signature:__________________________________        The information contained in the After Visit Summary has been reviewed with me, the patient and/or responsible adult, by my health care provider(s). I had the opportunity to ask questions regarding this information.   I have elected to receive;      [] Patient unable to sign Discharge Instructions given to ECF/Transportation/POA      []  After Visit Summary  []  Comprehensive Discharge Instruction          Electronically signed by Hector Yu DPM on 12/6/2017 at 7:04 AM

## 2017-12-12 ENCOUNTER — HOSPITAL ENCOUNTER (OUTPATIENT)
Dept: WOUND CARE | Age: 67
Discharge: HOME | End: 2017-12-12
Attending: PODIATRIST | Admitting: PODIATRIST

## 2017-12-12 VITALS
HEART RATE: 88 BPM | RESPIRATION RATE: 18 BRPM | TEMPERATURE: 98.2 F | DIASTOLIC BLOOD PRESSURE: 97 MMHG | SYSTOLIC BLOOD PRESSURE: 181 MMHG

## 2017-12-12 LAB
GLUCOSE BLD-MCNC: 319 MG/DL (ref 70–99)
PERFORMED ON: ABNORMAL

## 2017-12-12 RX ORDER — LIDOCAINE HYDROCHLORIDE 40 MG/ML
2.5 SOLUTION TOPICAL ONCE
Status: COMPLETED | OUTPATIENT
Start: 2017-12-12 | End: 2017-12-12

## 2017-12-12 RX ADMIN — LIDOCAINE HYDROCHLORIDE 2.5 ML: 40 SOLUTION TOPICAL at 15:05

## 2017-12-12 ASSESSMENT — PAIN DESCRIPTION - ORIENTATION: ORIENTATION: RIGHT

## 2017-12-12 ASSESSMENT — PAIN DESCRIPTION - LOCATION: LOCATION: FOOT;BACK;SHOULDER

## 2017-12-12 ASSESSMENT — PAIN SCALES - GENERAL: PAINLEVEL_OUTOF10: 7

## 2017-12-12 ASSESSMENT — PAIN DESCRIPTION - PAIN TYPE: TYPE: ACUTE PAIN

## 2017-12-12 ASSESSMENT — PAIN DESCRIPTION - DESCRIPTORS: DESCRIPTORS: ACHING

## 2017-12-12 NOTE — PROGRESS NOTES
[facility] [address]   Progress Note and Procedure Note      Amanda Larson  MEDICAL RECORD NUMBER:  [id number]  AGE: 75 y.o. GENDER: male  : 1950  EPISODE DATE:  2017    Subjective:     Chief Complaint   Patient presents with    Wound Check     f/u left plantar foot         HISTORY of PRESENT ILLNESS HPI     Amanda Larson is a 75 y.o. male who presents today for wound/ulcer evaluation. History of Wound Context: right foot wound at the distal aspect of a previous TMA. Patient has recently has his left great toe amputated. He has been having local wound care at home per his home health care nurse. He has been recently revascularized by Dr. Nelsy Hall. Patient relates that he has been being more compliant with wearing his CAM boot and has been using his walker. Wound/Ulcer Pain Timing/Severity: none  Quality of pain: N/A  Severity:  0 / 10   Modifying Factors: None  Associated Signs/Symptoms: none    Ulcer Identification:  Ulcer Type: arterial, diabetic and pressure  Contributing Factors: diabetes and arterial insufficiency    Wound: N/A        PAST MEDICAL HISTORY        Diagnosis Date    CAD (coronary artery disease)     minor, per cardiac cath    Diabetes mellitus ([address]     Diabetic infection of left foot ([address]     w/osteomyelitis    H/O cocaine abuse 2015    History of CVA with residual deficit 2012    Hypertension     MRSA (methicillin resistant staph aureus) culture positive 2017    toe    Non-healing wound of lower extremity 2016    L great toe    Non-ischemic cardiomyopathy ([address]     per cardiac cath    PAD (peripheral artery disease) ([address] 2016    L PIPER - 0.6, unable to obtain readings on R    Peripheral neuropathy (HCC)     Seizure, convulsion ([address] 2016    Dr. Angeles Brumfield. Xochitl - due to scar from old L frontal infarction    Unspecified cerebral artery occlusion with cerebral infarction 2012       PAST SURGICAL HISTORY    Past Surgical History:   Procedure Laterality Date    CARDIAC CATHETERIZATION  01/31/2012    Dr. Mejia Shell Left 10/19/2017     INCISION AND DRAINAGE WITH AMPUTATION OF 2ND DIGIT LEFT FOOT    FOOT TENDON SURGERY Left 05/24/2013    , DPM - I&D to antebellum cortex w/wound closure, perineal tendon transfer    KNEE ARTHROSCOPY      OTHER SURGICAL HISTORY Left 05/17/2017    INCISION AND DRAINAGE, AMPUTATION OF HALLUX LEFT FOOT    TOE AMPUTATION Left 05/21/2013    Dr. Lizzy Rahman, DPSERA - I&D to bone cortex w/amputation of 5th metatarsal       FAMILY HISTORY    History reviewed. No pertinent family history. SOCIAL HISTORY    Social History   Substance Use Topics    Smoking status: Former Smoker     Types: Cigarettes     Quit date: 2/13/1993    Smokeless tobacco: Never Used      Comment: 2/13/2013 smokes a cigarette or cigar once or twice a year    Alcohol use Yes      Comment: 2/13/2013, drinks every so often (cold beer, or wine)       ALLERGIES    No Known Allergies    MEDICATIONS    Current Outpatient Prescriptions on File Prior to Encounter   Medication Sig Dispense Refill    amLODIPine (NORVASC) 5 MG tablet Take 1 tablet by mouth daily 90 tablet 3    Meloxicam 10 MG CAPS Take by mouth      carvedilol (COREG) 6.25 MG tablet Take 1 tablet by mouth 2 times daily (with meals) 180 tablet 3    aspirin 81 MG chewable tablet Take 1 tablet by mouth daily 90 tablet 3    gabapentin (NEURONTIN) 600 MG tablet Take 600 mg by mouth 2 times daily      busPIRone (BUSPAR) 10 MG tablet Take 7.5 mg by mouth 2 times daily      losartan (COZAAR) 25 MG tablet Take 25 mg by mouth daily      atorvastatin (LIPITOR) 10 MG tablet Take 10 mg by mouth nightly      insulin glargine (LANTUS) 100 UNIT/ML injection vial Inject 30 Units into the skin nightly        No current facility-administered medications on file prior to encounter.         REVIEW OF SYSTEMS    Pertinent pain R52, G89.29    PAD [facility]peripheral artery disease) ([facility]) I73.9    Seizure, convulsion ([facility]) R56.9    Diabetic infection of left foot ([facility]) E11.69, L08.9    Non-ischemic cardiomyopathy ([facility]) I42.8    Non-healing wound of lower extremity S81.809A    Osteomyelitis of left foot ([facility]) M86.9    Ischemic ulcer diabetic foot ([facility] E11.621, L97.509    Seizure disorder ([facility]) G40.909    Elevated troponin R74.8    CKD (chronic kidney disease) stage 3, GFR 30-59 ml/min N18.3    Toe osteomyelitis, left ([facility]) M86.9    Critical lower limb ischemia I99.8    S/P angioplasty with stent Z95.9        Procedure Note  Indications:  Based on my examination of this patient's wound(s)/ulcer(s) today, debridement is required to promote healing and evaluate the wound base. Performed by: Evonne Freedman DPM    Consent obtained:  Yes    Time out taken:  Yes    Pain Control: Anesthetic  Anesthetic: 4% Lidocaine Liquid Topical       Debridement:Excisional Debridement    Using curette, #15 blade scalpel and forceps the wound(s)/ulcer(s) was/were sharply debrided down through and including the removal of epidermis, dermis, subcutaneous tissue and muscle/fascia.         Devitalized Tissue Debrided:  fibrin and slough    Pre Debridement Measurements:  Are located in the [place]  Documentation Flow Sheet    Wound/Ulcer #: 3    Post Debridement Measurements:  Wound/Ulcer Descriptions are Pre Debridement except measurements:    Wound 05/23/17 Foot Right;Plantar;Lateral #3- diabetic (Active)   Wound Type Wound 12/12/2017  2:54 PM   Wound Diabetic Porter 2 11/7/2017  3:30 PM   Dressing/Treatment Dry dressing 12/5/2017  4:06 PM   Wound Cleansed Rinsed/Irrigated with saline 12/12/2017  2:54 PM   Wound Length (cm) 5 cm 12/12/2017  2:54 PM   Wound Width (cm) 4cm 12/12/2017  2:54 PM   Wound Depth (cm)  0.4  12/12/2017  2:54 PM   Calculated Wound Size (cm^2) (l*w) 19.5 cm^2 12/12/2017  2:54 PM   Change in Wound Size % (l*w) -322.08 12/12/2017  2:54 PM   Tunneling Position ___ O'Clock 0 11/7/2017  3:30 PM   Undermining Starts ___ O'Clock 100 12/12/2017  2:54 PM   Undermining Ends___ O'Clock 1100 12/12/2017  2:54 PM   Undermining Maxium Distance (cm) 0.2 12/12/2017  2:54 PM   Wound Assessment Pink;Red;Slough; Yellow 12/12/2017  2:54 PM   Drainage Amount Moderate 12/12/2017  2:54 PM   Drainage Description Brown;Green;Yellow 12/12/2017  2:54 PM   Odor None 12/12/2017  2:54 PM   Margins Defined edges 12/12/2017  2:54 PM   Exposed structure Tendon 11/21/2017  2:34 PM   Candida-wound Assessment White 12/12/2017  2:54 PM   Non-staged Wound Description Full thickness 12/12/2017  2:54 PM   [place]%Wound Bed 10 12/12/2017  2:54 PM   Red%Wound Bed 70 12/12/2017  2:54 PM   Yellow%Wound Bed 20 12/12/2017  2:54 PM   Black%Wound Bed 30 11/14/2017  2:36 PM   Op First Treatment Date 05/23/17 8/22/2017  2:55 PM   Number of days: 203     Percent of Wound/Ulcer Debrided: 100%    Total Surface Area Debrided:  20 sq cm     Diabetic/Pressure/Non Pressure Ulcers only:  Ulcer: Diabetic ulcer, fat layer exposed      Estimated Blood Loss:  Minimal    Hemostasis Achieved:  by pressure    Procedural Pain:  0  / 10     Post Procedural Pain:  0 / 10     Response to treatment:  Well tolerated by patient. Plan:   Discussed with patient at length that the wound is not adequately offloaded. He MUST use his walker. Patient relates that he will be better about using his walker. Orders written for local wound care with maximiliano daily. Offload wound with cast boot and walker. Sutures removed from left toe amp site. Well healed. Treatment Note please see attached Discharge Instructions    In my professional opinion this patient would benefit from HBO Therapy: No    Written patient dismissal instructions given to patient and signed by patient or POA. RTC 1 week.     Discharge 200 [facility] Physician Orders and Discharge Instructions  [facility] - responsible adult, by my health care provider(s). I had the opportunity to ask questions regarding this information.   I have elected to receive;      [] Patient unable to sign Discharge Instructions given to ECF/Transportation/POA      []  After Visit Summary  []  Comprehensive Discharge Instruction          Electronically signed by Bruce King DPM on 12/12/2017 at 3:32 PM

## 2017-12-14 ENCOUNTER — TELEPHONE (OUTPATIENT)
Dept: WOUND CARE | Age: 67
End: 2017-12-14

## 2017-12-14 NOTE — TELEPHONE ENCOUNTER
[employer] care called, pt now only has Medicare and Medicaid, they will not cover daily dressing changes. Home health wants to know if the order can be changed to 3 times a week,  or Dr Dixon Rather please advise.

## 2017-12-19 ENCOUNTER — HOSPITAL ENCOUNTER (OUTPATIENT)
Dept: WOUND CARE | Age: 67
Discharge: HOME | End: 2017-12-19
Attending: PODIATRIST | Admitting: PODIATRIST

## 2017-12-19 VITALS
RESPIRATION RATE: 20 BRPM | DIASTOLIC BLOOD PRESSURE: 76 MMHG | TEMPERATURE: 97.8 F | SYSTOLIC BLOOD PRESSURE: 146 MMHG | HEART RATE: 73 BPM

## 2017-12-19 RX ORDER — LIDOCAINE HYDROCHLORIDE 40 MG/ML
2.5 SOLUTION TOPICAL ONCE
Status: COMPLETED | OUTPATIENT
Start: 2017-12-19 | End: 2017-12-19

## 2017-12-19 RX ADMIN — LIDOCAINE HYDROCHLORIDE 2.5 ML: 40 SOLUTION TOPICAL at 15:26

## 2017-12-19 ASSESSMENT — PAIN SCALES - GENERAL: PAINLEVEL_OUTOF10: 7

## 2017-12-19 ASSESSMENT — PAIN DESCRIPTION - DESCRIPTORS: DESCRIPTORS: ACHING

## 2017-12-19 ASSESSMENT — PAIN DESCRIPTION - PAIN TYPE: TYPE: ACUTE PAIN

## 2017-12-19 ASSESSMENT — PAIN DESCRIPTION - ONSET: ONSET: ON-GOING

## 2017-12-19 ASSESSMENT — PAIN DESCRIPTION - FREQUENCY: FREQUENCY: INTERMITTENT

## 2017-12-19 ASSESSMENT — PAIN DESCRIPTION - PROGRESSION: CLINICAL_PROGRESSION: NOT CHANGED

## 2017-12-20 NOTE — PROGRESS NOTES
Mary Pettit [address]   Progress Note and Procedure Note      Liu Rojas  MEDICAL RECORD NUMBER:  [id number]  AGE: 75 y.o. GENDER: male  : 1950  EPISODE DATE:  2017    Subjective:     Chief Complaint   Patient presents with    Wound Check     right foot         HISTORY of PRESENT ILLNESS HPI     Liu Rojas is a 75 y.o. male who presents today for wound/ulcer evaluation. History of Wound Context: right foot wound at the distal aspect of a previous TMA. Patient has recently has his left great toe amputated. He has been having local wound care at home per his home health care nurse. He has been recently revascularized by Dr. Harsha Dumont. Patient relates that he has been being more compliant with wearing his CAM boot and has been using his walker. Wound/Ulcer Pain Timing/Severity: none  Quality of pain: N/A  Severity:  0 / 10   Modifying Factors: None  Associated Signs/Symptoms: none    Ulcer Identification:  Ulcer Type: arterial, diabetic and pressure  Contributing Factors: diabetes and arterial insufficiency    Wound: N/A        PAST MEDICAL HISTORY        Diagnosis Date    CAD (coronary artery disease) 2012    minor, per cardiac cath    Diabetes mellitus ([address]     Diabetic infection of left foot ([address] 2013    w/osteomyelitis    H/O cocaine abuse 2015    History of CVA with residual deficit 2012    Hypertension     MRSA (methicillin resistant staph aureus) culture positive 2017    toe    Non-healing wound of lower extremity 2016    L great toe    Non-ischemic cardiomyopathy ([address] 2012    per cardiac cath    PAD (peripheral artery disease) ([address] 2016    L PIPER - 0.6, unable to obtain readings on R    Peripheral neuropathy (HCC)     Seizure, convulsion ([address] 2016    Dr. Maria Teresa Leung - due to scar from old L frontal infarction    Unspecified cerebral artery occlusion with cerebral infarction 2012       PAST SURGICAL HISTORY    Past Surgical History:   Procedure Laterality Date    CARDIAC CATHETERIZATION  01/31/2012    Dr. Stacey Cramer Left 10/19/2017     INCISION AND DRAINAGE WITH AMPUTATION OF 2ND DIGIT LEFT FOOT    FOOT TENDON SURGERY Left 05/24/2013    , DPM - I&D to antebellum cortex w/wound closure, perineal tendon transfer    KNEE ARTHROSCOPY      OTHER SURGICAL HISTORY Left 05/17/2017    INCISION AND DRAINAGE, AMPUTATION OF HALLUX LEFT FOOT    TOE AMPUTATION Left 05/21/2013    Dr. Gloria Jackson, DPM - I&D to bone cortex w/amputation of 5th metatarsal       FAMILY HISTORY    History reviewed. No pertinent family history. SOCIAL HISTORY    Social History   Substance Use Topics    Smoking status: Former Smoker     Types: Cigarettes     Quit date: 2/13/1993    Smokeless tobacco: Never Used      Comment: 2/13/2013 smokes a cigarette or cigar once or twice a year    Alcohol use Yes      Comment: 2/13/2013, drinks every so often (cold beer, or wine)       ALLERGIES    No Known Allergies    MEDICATIONS    Current Outpatient Prescriptions on File Prior to Encounter   Medication Sig Dispense Refill    amLODIPine (NORVASC) 5 MG tablet Take 1 tablet by mouth daily 90 tablet 3    Meloxicam 10 MG CAPS Take by mouth      carvedilol (COREG) 6.25 MG tablet Take 1 tablet by mouth 2 times daily (with meals) 180 tablet 3    aspirin 81 MG chewable tablet Take 1 tablet by mouth daily 90 tablet 3    gabapentin (NEURONTIN) 600 MG tablet Take 600 mg by mouth 2 times daily      busPIRone (BUSPAR) 10 MG tablet Take 7.5 mg by mouth 2 times daily      losartan (COZAAR) 25 MG tablet Take 25 mg by mouth daily      atorvastatin (LIPITOR) 10 MG tablet Take 10 mg by mouth nightly      insulin glargine (LANTUS) 100 UNIT/ML injection vial Inject 30 Units into the skin nightly        No current facility-administered medications on file prior to encounter.         REVIEW OF SYSTEMS    Pertinent items are noted in HPI. Review of Systems: A 12 point review of symptoms is unremarkable with the exception of the chief complaint. Patient specifically denies nausea, fever, vomiting, chills, shortness of breath, chest pain, abdominal pain, constipation or difficulty urinating. Objective:      BP (!) 146/76   Pulse 73   Temp 97.8 °F (36.6 °C) (Oral)   Resp 20     Wt Readings from Last 3 Encounters:   10/17/17 212 lb 1.3 oz (96.2 kg)   07/20/17 182 lb (82.6 kg)   07/12/17 184 lb 12.8 oz (83.8 kg)       PHYSICAL EXAM    Patient presents today without his walker. DP/PT pulses nonplapable bilaterally. TMA noted on the right. Great toe ad 5th digit amputated on the left. Digits are pink and warm to the touch. Hair growth is absent. Mild edema noted. No calf pain with palpation noted. Epicritic sensation is grossly absent bilaterally. Negative clonus and babinski reflex is down going. Incision on the left great toe amputation site is healed. No signs of infection. Wound noted on the distal lateral  Plantar aspect if the right TMA site. Wound has fibrotic and nonviable tissue that extends down through and includes the subcutaneous tissue/muscle/fascia. After debridement the wound has a granular base. There is no surrounding erythema, edema, warmth or malodor noted. The wound does not probe or track to bone. Incision noted on the left foot is well approximated and coapted. No signs of infection. No signs of dehiscence.    .   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    DM (diabetes mellitus) ([address] E11.9    Essential hypertension I10    History of CVA with residual deficit I69.30    Cellulitis of foot L03.119    Diabetic foot infection ([address] E11.69, L08.9    MADYSON (acute kidney injury) ([address] N17.9    H/O cocaine abuse Z87.898    New onset seizure ([address] R56.9    Chronic generalized pain R52, G89.29    PAD (peripheral artery disease) ([facility]) I73.9    Seizure, convulsion ([facility]) R56.9    Diabetic infection of left foot ([facility]) E11.69, L08.9    Non-ischemic cardiomyopathy ([facility]) I42.8    Non-healing wound of lower extremity S81.809A    Osteomyelitis of left foot ([facility]) M86.9    Ischemic ulcer diabetic foot ([facility] E11.621, L97.509    Seizure disorder ([facility]) G40.909    Elevated troponin R74.8    CKD (chronic kidney disease) stage 3, GFR 30-59 ml/min N18.3    Toe osteomyelitis, left ([facility]) M86.9    Critical lower limb ischemia I99.8    S/P angioplasty with stent Z95.9        Procedure Note  Indications:  Based on my examination of this patient's wound(s)/ulcer(s) today, debridement is required to promote healing and evaluate the wound base. Performed by: Onesimo Belle DPM    Consent obtained:  Yes    Time out taken:  Yes    Pain Control: Anesthetic  Anesthetic: 4% Lidocaine Liquid Topical       Debridement:Excisional Debridement    Using curette, #15 blade scalpel and forceps the wound(s)/ulcer(s) was/were sharply debrided down through and including the removal of epidermis, dermis, subcutaneous tissue and muscle/fascia.         Devitalized Tissue Debrided:  fibrin and slough    Pre Debridement Measurements:  Are located in the [place]  Documentation Flow Sheet    Wound/Ulcer #: 3    Post Debridement Measurements:  Wound/Ulcer Descriptions are Pre Debridement except measurements:    Wound 05/23/17 Foot Right;Plantar;Lateral #3- diabetic (Active)   Wound Type Wound 12/19/2017  3:27 PM   Wound Diabetic Porter 2 11/7/2017  3:30 PM   Dressing/Treatment Dry dressing 12/5/2017  4:06 PM   Wound Cleansed Rinsed/Irrigated with saline 12/19/2017  3:27 PM   Wound Length (cm) 5 cm 12/19/2017  3:27 PM   Wound Width (cm) 3.8 cm 12/19/2017  3:27 PM   Wound Depth (cm)  0.3 12/19/2017  3:27 PM   Calculated Wound Size (cm^2) (l*w) 17.64 cm^2 12/19/2017  3:27 PM   Change in Wound Size % (l*w) -281.82 12/19/2017  3:27 PM Tunneling Position ___ O'Clock 0 11/7/2017  3:30 PM   Undermining Starts ___ O'Clock 1000 12/19/2017  3:27 PM   Undermining Ends___ O'Clock 1100 12/19/2017  3:27 PM   Undermining Maxium Distance (cm) 0.1 12/19/2017  3:27 PM   Wound Assessment Pink;Slough; Yellow 12/19/2017  3:27 PM   Drainage Amount Moderate 12/19/2017  3:27 PM   Drainage Description Brown;Serosanguinous 12/19/2017  3:27 PM   Odor None 12/19/2017  3:27 PM   Margins Defined edges 12/19/2017  3:27 PM   Exposed structure Tendon 11/21/2017  2:34 PM   Candida-wound Assessment White; Maceration 12/19/2017  3:27 PM   Non-staged Wound Description Full thickness 12/19/2017  3:27 PM   [place]%Wound Bed 65 12/19/2017  3:27 PM   Red%Wound Bed 70 12/12/2017  2:54 PM   Yellow%Wound Bed 35 12/19/2017  3:27 PM   Black%Wound Bed 30 11/14/2017  2:36 PM   Op First Treatment Date 05/23/17 8/22/2017  2:55 PM   Number of days: 210     Percent of Wound/Ulcer Debrided: 100%    Total Surface Area Debrided:  19 sq cm     Diabetic/Pressure/Non Pressure Ulcers only:  Ulcer: Diabetic ulcer, fat layer exposed      Estimated Blood Loss:  Minimal    Hemostasis Achieved:  by pressure    Procedural Pain:  0  / 10     Post Procedural Pain:  0 / 10     Response to treatment:  Well tolerated by patient. Plan:   Discussed with patient at length that the wound is not adequately offloaded. He MUST use his walker. Patient relates that he will be better about using his walker. Patient inquired about pain medication. As the amputation site has completely healed on the left lower extremity it is not necessary at this time and I will not be prescribing him narcotic pain medication. Orders written for local wound care with maximiliano daily. Offload wound with cast boot and walker.     Treatment Note please see attached Discharge Instructions    In my professional opinion this patient would benefit from HBO Therapy: No    Written patient dismissal instructions given to patient and signed by patient or POA. RTC 1 week. Discharge 200 [facility] Physician Orders and Discharge Instructions  [facility] [id number] [place] [address] [address] [facility]  Telephone: [phone]    NAME:  Dean Rodriguez  YOB: 1950  MEDICAL RECORD NUMBER:  [id number]  DATE:  12/19/2017    Wash hands with soap and water prior to and after every dressing change. Wound Cleansing:   · Do not scrub or use excessive force. · With each dressing change, rinse wounds with 0.9% Saline. (May use wound wash or soft contact solution. Both can be purchased at a local drug store). · If unable to obtain saline, may use a gentle soap and water. · Keep wounds dry in the shower unless otherwise instructed by the physician. Topical Treatments:  Do not apply lotions, creams, or ointments to wound bed unless directed. [x] Apply moisturizing lotion to skin surrounding the wound prior to dressing change.  [] Apply antifungal ointment to skin surrounding the wound prior to dressing change.  [] Apply thin film of moisture barrier ointment to skin immediately around wound. [] Other:      Dressings:           Wound Location: Right Plantar Foot     [x] Apply Primary Dressing to wound:       [] Foam/Foam with Border(i.e Mepilex) [] Non-adherent (i.e.Mepitel)   [] Alginate with Silver (i.e. Silvercel)   [] Alginate   [] Collagen (i.e. Puracol) [x] Collagen with Silver(i.e. Linda)     [] Hydrocolloid  [] Hydrafera Blue moistened with saline   [] MediHoney Paste/Gel [] Hydrogel      [] Santyl with Moisten saline gauze    [] Bactroban/Mupirocin [] Polysporin  [] Other:      Pack wound loosely with  [] Iodoform   [] Plain Packing  [] Other      [x] Cover and Secure with:     [x] Gauze [] ABD [] Stretch bandage roll [x] Kerlix   [] Coban [] Ace Wrap [] Cover Roll Tape    [] Other:    Avoid contact of tape with skin.     [x] Change

## 2018-01-01 ENCOUNTER — APPOINTMENT (OUTPATIENT)
Dept: MRI IMAGING | Age: 68
DRG: 871 | End: 2018-01-01
Payer: MEDICARE

## 2018-01-01 ENCOUNTER — HOSPITAL ENCOUNTER (INPATIENT)
Age: 68
LOS: 9 days | Discharge: NURSING, REHAB OR LONG-TERM CARE | DRG: 871 | End: 2018-09-02
Attending: EMERGENCY MEDICINE | Admitting: INTERNAL MEDICINE
Payer: MEDICARE

## 2018-01-01 ENCOUNTER — APPOINTMENT (OUTPATIENT)
Dept: CT IMAGING | Age: 68
DRG: 871 | End: 2018-01-01
Payer: MEDICARE

## 2018-01-01 ENCOUNTER — APPOINTMENT (OUTPATIENT)
Dept: GENERAL RADIOLOGY | Age: 68
DRG: 871 | End: 2018-01-01
Payer: MEDICARE

## 2018-01-01 VITALS
WEIGHT: 202.82 LBS | TEMPERATURE: 98.5 F | HEART RATE: 76 BPM | HEIGHT: 71 IN | BODY MASS INDEX: 28.4 KG/M2 | SYSTOLIC BLOOD PRESSURE: 147 MMHG | DIASTOLIC BLOOD PRESSURE: 65 MMHG | OXYGEN SATURATION: 94 % | RESPIRATION RATE: 18 BRPM

## 2018-01-01 DIAGNOSIS — N17.9 AKI (ACUTE KIDNEY INJURY) (HCC): Primary | ICD-10-CM

## 2018-01-01 DIAGNOSIS — R41.82 ALTERED MENTAL STATUS, UNSPECIFIED ALTERED MENTAL STATUS TYPE: ICD-10-CM

## 2018-01-01 LAB
ALBUMIN SERPL-MCNC: 2 G/DL (ref 3.4–5)
ALBUMIN SERPL-MCNC: 2.2 G/DL (ref 3.4–5)
ALBUMIN SERPL-MCNC: 2.3 G/DL (ref 3.4–5)
ALBUMIN SERPL-MCNC: 2.6 G/DL (ref 3.4–5)
ALBUMIN SERPL-MCNC: 2.9 G/DL (ref 3.4–5)
AMMONIA: 22 UMOL/L (ref 16–60)
AMORPHOUS: ABNORMAL /HPF
AMPHETAMINE SCREEN, URINE: ABNORMAL
ANION GAP SERPL CALCULATED.3IONS-SCNC: 10 MMOL/L (ref 3–16)
ANION GAP SERPL CALCULATED.3IONS-SCNC: 11 MMOL/L (ref 3–16)
ANION GAP SERPL CALCULATED.3IONS-SCNC: 12 MMOL/L (ref 3–16)
ANION GAP SERPL CALCULATED.3IONS-SCNC: 13 MMOL/L (ref 3–16)
ANION GAP SERPL CALCULATED.3IONS-SCNC: 8 MMOL/L (ref 3–16)
ANISOCYTOSIS: ABNORMAL
APTT: 30.3 SEC (ref 26–36)
ATYPICAL LYMPHOCYTE RELATIVE PERCENT: 3 % (ref 0–6)
B-TYPE NATRIURETIC PEPTIDE: 207 PG/ML (ref 0–99.9)
BACTERIA: ABNORMAL /HPF
BANDED NEUTROPHILS RELATIVE PERCENT: 5 % (ref 0–7)
BANDED NEUTROPHILS RELATIVE PERCENT: 9 % (ref 0–7)
BARBITURATE SCREEN URINE: ABNORMAL
BASE EXCESS VENOUS: -3 (ref -3–3)
BASOPHILS ABSOLUTE: 0 K/UL (ref 0–0.2)
BASOPHILS ABSOLUTE: 0.1 K/UL (ref 0–0.2)
BASOPHILS ABSOLUTE: 0.1 K/UL (ref 0–0.2)
BASOPHILS RELATIVE PERCENT: 0 %
BASOPHILS RELATIVE PERCENT: 0.1 %
BASOPHILS RELATIVE PERCENT: 0.3 %
BASOPHILS RELATIVE PERCENT: 0.3 %
BENZODIAZEPINE SCREEN, URINE: ABNORMAL
BILIRUBIN URINE: NEGATIVE
BILIRUBIN URINE: NEGATIVE
BLOOD CULTURE, ROUTINE: NORMAL
BLOOD CULTURE, ROUTINE: NORMAL
BLOOD, URINE: ABNORMAL
BLOOD, URINE: ABNORMAL
BUN BLDV-MCNC: 12 MG/DL (ref 7–20)
BUN BLDV-MCNC: 13 MG/DL (ref 7–20)
BUN BLDV-MCNC: 15 MG/DL (ref 7–20)
BUN BLDV-MCNC: 16 MG/DL (ref 7–20)
BUN BLDV-MCNC: 18 MG/DL (ref 7–20)
BUN BLDV-MCNC: 20 MG/DL (ref 7–20)
BUN BLDV-MCNC: 24 MG/DL (ref 7–20)
BURR CELLS: ABNORMAL
BURR CELLS: ABNORMAL
CALCIUM IONIZED: 0.98 MMOL/L (ref 1.12–1.32)
CALCIUM IONIZED: 1.05 MMOL/L (ref 1.12–1.32)
CALCIUM SERPL-MCNC: 7.8 MG/DL (ref 8.3–10.6)
CALCIUM SERPL-MCNC: 7.8 MG/DL (ref 8.3–10.6)
CALCIUM SERPL-MCNC: 8 MG/DL (ref 8.3–10.6)
CALCIUM SERPL-MCNC: 8.1 MG/DL (ref 8.3–10.6)
CALCIUM SERPL-MCNC: 8.1 MG/DL (ref 8.3–10.6)
CANNABINOID SCREEN URINE: ABNORMAL
CHLORIDE BLD-SCNC: 102 MMOL/L (ref 99–110)
CHLORIDE BLD-SCNC: 92 MMOL/L (ref 99–110)
CHLORIDE BLD-SCNC: 94 MMOL/L (ref 99–110)
CHLORIDE BLD-SCNC: 95 MMOL/L (ref 99–110)
CHLORIDE BLD-SCNC: 96 MMOL/L (ref 99–110)
CHLORIDE BLD-SCNC: 96 MMOL/L (ref 99–110)
CHLORIDE BLD-SCNC: 97 MMOL/L (ref 99–110)
CHLORIDE URINE RANDOM: 35 MMOL/L
CLARITY: CLEAR
CLARITY: CLEAR
CO2: 22 MMOL/L (ref 21–32)
CO2: 23 MMOL/L (ref 21–32)
CO2: 24 MMOL/L (ref 21–32)
CO2: 24 MMOL/L (ref 21–32)
CO2: 25 MMOL/L (ref 21–32)
CO2: 25 MMOL/L (ref 21–32)
CO2: 26 MMOL/L (ref 21–32)
COCAINE METABOLITE SCREEN URINE: POSITIVE
COLOR: YELLOW
COLOR: YELLOW
CREAT SERPL-MCNC: 1.1 MG/DL (ref 0.8–1.3)
CREAT SERPL-MCNC: 1.1 MG/DL (ref 0.8–1.3)
CREAT SERPL-MCNC: 1.2 MG/DL (ref 0.8–1.3)
CREAT SERPL-MCNC: 1.2 MG/DL (ref 0.8–1.3)
CREAT SERPL-MCNC: 1.4 MG/DL (ref 0.8–1.3)
CREAT SERPL-MCNC: 1.5 MG/DL (ref 0.8–1.3)
CREAT SERPL-MCNC: 1.7 MG/DL (ref 0.8–1.3)
CREAT SERPL-MCNC: 1.8 MG/DL (ref 0.8–1.3)
CREAT SERPL-MCNC: 2 MG/DL (ref 0.8–1.3)
CREATININE URINE: 47.9 MG/DL (ref 39–259)
CULTURE, BLOOD 2: NORMAL
CULTURE, BLOOD 2: NORMAL
DOHLE BODIES: PRESENT
DOHLE BODIES: PRESENT
EKG ATRIAL RATE: 101 BPM
EKG DIAGNOSIS: NORMAL
EKG P AXIS: 36 DEGREES
EKG P-R INTERVAL: 144 MS
EKG Q-T INTERVAL: 344 MS
EKG QRS DURATION: 106 MS
EKG QTC CALCULATION (BAZETT): 446 MS
EKG R AXIS: -57 DEGREES
EKG T AXIS: 48 DEGREES
EKG VENTRICULAR RATE: 101 BPM
EOSINOPHILS ABSOLUTE: 0 K/UL (ref 0–0.6)
EOSINOPHILS ABSOLUTE: 0.1 K/UL (ref 0–0.6)
EOSINOPHILS RELATIVE PERCENT: 0 %
EOSINOPHILS RELATIVE PERCENT: 0.2 %
EOSINOPHILS RELATIVE PERCENT: 0.5 %
GFR AFRICAN AMERICAN: 34
GFR AFRICAN AMERICAN: 40
GFR AFRICAN AMERICAN: 45
GFR AFRICAN AMERICAN: 49
GFR AFRICAN AMERICAN: 56
GFR AFRICAN AMERICAN: >60
GFR NON-AFRICAN AMERICAN: 28
GFR NON-AFRICAN AMERICAN: 33
GFR NON-AFRICAN AMERICAN: 38
GFR NON-AFRICAN AMERICAN: 40
GFR NON-AFRICAN AMERICAN: 46
GFR NON-AFRICAN AMERICAN: 50
GFR NON-AFRICAN AMERICAN: >60
GLUCOSE BLD-MCNC: 100 MG/DL (ref 70–99)
GLUCOSE BLD-MCNC: 102 MG/DL (ref 70–99)
GLUCOSE BLD-MCNC: 103 MG/DL (ref 70–99)
GLUCOSE BLD-MCNC: 103 MG/DL (ref 70–99)
GLUCOSE BLD-MCNC: 106 MG/DL (ref 70–99)
GLUCOSE BLD-MCNC: 106 MG/DL (ref 70–99)
GLUCOSE BLD-MCNC: 113 MG/DL (ref 70–99)
GLUCOSE BLD-MCNC: 114 MG/DL (ref 70–99)
GLUCOSE BLD-MCNC: 118 MG/DL (ref 70–99)
GLUCOSE BLD-MCNC: 120 MG/DL (ref 70–99)
GLUCOSE BLD-MCNC: 124 MG/DL (ref 70–99)
GLUCOSE BLD-MCNC: 129 MG/DL (ref 70–99)
GLUCOSE BLD-MCNC: 132 MG/DL (ref 70–99)
GLUCOSE BLD-MCNC: 134 MG/DL (ref 70–99)
GLUCOSE BLD-MCNC: 152 MG/DL (ref 70–99)
GLUCOSE BLD-MCNC: 158 MG/DL (ref 70–99)
GLUCOSE BLD-MCNC: 160 MG/DL (ref 70–99)
GLUCOSE BLD-MCNC: 161 MG/DL (ref 70–99)
GLUCOSE BLD-MCNC: 164 MG/DL (ref 70–99)
GLUCOSE BLD-MCNC: 169 MG/DL (ref 70–99)
GLUCOSE BLD-MCNC: 174 MG/DL (ref 70–99)
GLUCOSE BLD-MCNC: 175 MG/DL (ref 70–99)
GLUCOSE BLD-MCNC: 176 MG/DL (ref 70–99)
GLUCOSE BLD-MCNC: 186 MG/DL (ref 70–99)
GLUCOSE BLD-MCNC: 195 MG/DL (ref 70–99)
GLUCOSE BLD-MCNC: 407 MG/DL (ref 70–99)
GLUCOSE BLD-MCNC: 417 MG/DL (ref 70–99)
GLUCOSE BLD-MCNC: 422 MG/DL (ref 70–99)
GLUCOSE BLD-MCNC: 448 MG/DL (ref 70–99)
GLUCOSE BLD-MCNC: 49 MG/DL (ref 70–99)
GLUCOSE BLD-MCNC: 54 MG/DL (ref 70–99)
GLUCOSE BLD-MCNC: 57 MG/DL (ref 70–99)
GLUCOSE BLD-MCNC: 60 MG/DL (ref 70–99)
GLUCOSE BLD-MCNC: 61 MG/DL (ref 70–99)
GLUCOSE BLD-MCNC: 61 MG/DL (ref 70–99)
GLUCOSE BLD-MCNC: 63 MG/DL (ref 70–99)
GLUCOSE BLD-MCNC: 68 MG/DL (ref 70–99)
GLUCOSE BLD-MCNC: 71 MG/DL (ref 70–99)
GLUCOSE BLD-MCNC: 74 MG/DL (ref 70–99)
GLUCOSE BLD-MCNC: 76 MG/DL (ref 70–99)
GLUCOSE BLD-MCNC: 76 MG/DL (ref 70–99)
GLUCOSE BLD-MCNC: 78 MG/DL (ref 70–99)
GLUCOSE BLD-MCNC: 79 MG/DL (ref 70–99)
GLUCOSE BLD-MCNC: 81 MG/DL (ref 70–99)
GLUCOSE BLD-MCNC: 82 MG/DL (ref 70–99)
GLUCOSE BLD-MCNC: 84 MG/DL (ref 70–99)
GLUCOSE BLD-MCNC: 89 MG/DL (ref 70–99)
GLUCOSE BLD-MCNC: 90 MG/DL (ref 70–99)
GLUCOSE BLD-MCNC: 90 MG/DL (ref 70–99)
GLUCOSE BLD-MCNC: 91 MG/DL (ref 70–99)
GLUCOSE BLD-MCNC: 92 MG/DL (ref 70–99)
GLUCOSE BLD-MCNC: 97 MG/DL (ref 70–99)
GLUCOSE BLD-MCNC: 98 MG/DL (ref 70–99)
GLUCOSE BLD-MCNC: 99 MG/DL (ref 70–99)
GLUCOSE BLD-MCNC: 99 MG/DL (ref 70–99)
GLUCOSE URINE: >=1000 MG/DL
GLUCOSE URINE: NEGATIVE MG/DL
HCO3 VENOUS: 22.3 MMOL/L (ref 23–29)
HCT VFR BLD CALC: 28.9 % (ref 40.5–52.5)
HCT VFR BLD CALC: 32.7 % (ref 40.5–52.5)
HCT VFR BLD CALC: 33.7 % (ref 40.5–52.5)
HCT VFR BLD CALC: 34 % (ref 40.5–52.5)
HCT VFR BLD CALC: 34.2 % (ref 40.5–52.5)
HCT VFR BLD CALC: 37.8 % (ref 40.5–52.5)
HCT VFR BLD CALC: 39 % (ref 40.5–52.5)
HEMOGLOBIN: 10.7 G/DL (ref 13.5–17.5)
HEMOGLOBIN: 11 G/DL (ref 13.5–17.5)
HEMOGLOBIN: 11.3 G/DL (ref 13.5–17.5)
HEMOGLOBIN: 11.4 G/DL (ref 13.5–17.5)
HEMOGLOBIN: 12.3 G/DL (ref 13.5–17.5)
HEMOGLOBIN: 12.9 G/DL (ref 13.5–17.5)
HEMOGLOBIN: 9.5 G/DL (ref 13.5–17.5)
INR BLD: 1.08 (ref 0.86–1.14)
KEPPRA DOSE AMT: ABNORMAL
KEPPRA DOSE AMT: NORMAL
KEPPRA: 23.3 UG/ML (ref 6–46)
KEPPRA: 5 UG/ML (ref 6–46)
KETONES, URINE: NEGATIVE MG/DL
KETONES, URINE: NEGATIVE MG/DL
LACTATE: 4.27 MMOL/L (ref 0.4–2)
LACTATE: 5.61 MMOL/L (ref 0.4–2)
LACTIC ACID: 1.3 MMOL/L (ref 0.4–2)
LACTIC ACID: 2.4 MMOL/L (ref 0.4–2)
LACTIC ACID: 2.8 MMOL/L (ref 0.4–2)
LEUKOCYTE ESTERASE, URINE: NEGATIVE
LEUKOCYTE ESTERASE, URINE: NEGATIVE
LV EF: 43 %
LVEF MODALITY: NORMAL
LYMPHOCYTES ABSOLUTE: 2.2 K/UL (ref 1–5.1)
LYMPHOCYTES ABSOLUTE: 3 K/UL (ref 1–5.1)
LYMPHOCYTES ABSOLUTE: 3.5 K/UL (ref 1–5.1)
LYMPHOCYTES ABSOLUTE: 4 K/UL (ref 1–5.1)
LYMPHOCYTES ABSOLUTE: 4.4 K/UL (ref 1–5.1)
LYMPHOCYTES ABSOLUTE: 4.5 K/UL (ref 1–5.1)
LYMPHOCYTES ABSOLUTE: 5.1 K/UL (ref 1–5.1)
LYMPHOCYTES RELATIVE PERCENT: 12 %
LYMPHOCYTES RELATIVE PERCENT: 14 %
LYMPHOCYTES RELATIVE PERCENT: 16.4 %
LYMPHOCYTES RELATIVE PERCENT: 16.5 %
LYMPHOCYTES RELATIVE PERCENT: 18 %
LYMPHOCYTES RELATIVE PERCENT: 18.4 %
LYMPHOCYTES RELATIVE PERCENT: 19.7 %
Lab: ABNORMAL
MAGNESIUM: 1.6 MG/DL (ref 1.8–2.4)
MAGNESIUM: 1.7 MG/DL (ref 1.8–2.4)
MCH RBC QN AUTO: 27.1 PG (ref 26–34)
MCH RBC QN AUTO: 27.2 PG (ref 26–34)
MCH RBC QN AUTO: 27.2 PG (ref 26–34)
MCH RBC QN AUTO: 27.3 PG (ref 26–34)
MCH RBC QN AUTO: 27.4 PG (ref 26–34)
MCH RBC QN AUTO: 27.5 PG (ref 26–34)
MCH RBC QN AUTO: 27.6 PG (ref 26–34)
MCHC RBC AUTO-ENTMCNC: 32.7 G/DL (ref 31–36)
MCHC RBC AUTO-ENTMCNC: 32.7 G/DL (ref 31–36)
MCHC RBC AUTO-ENTMCNC: 32.8 G/DL (ref 31–36)
MCHC RBC AUTO-ENTMCNC: 32.9 G/DL (ref 31–36)
MCHC RBC AUTO-ENTMCNC: 33.1 G/DL (ref 31–36)
MCHC RBC AUTO-ENTMCNC: 33.1 G/DL (ref 31–36)
MCHC RBC AUTO-ENTMCNC: 33.5 G/DL (ref 31–36)
MCV RBC AUTO: 82.1 FL (ref 80–100)
MCV RBC AUTO: 82.4 FL (ref 80–100)
MCV RBC AUTO: 82.6 FL (ref 80–100)
MCV RBC AUTO: 82.8 FL (ref 80–100)
MCV RBC AUTO: 83.3 FL (ref 80–100)
MCV RBC AUTO: 83.3 FL (ref 80–100)
MCV RBC AUTO: 83.6 FL (ref 80–100)
METHADONE SCREEN, URINE: ABNORMAL
MICROSCOPIC EXAMINATION: YES
MICROSCOPIC EXAMINATION: YES
MONOCYTES ABSOLUTE: 0 K/UL (ref 0–1.3)
MONOCYTES ABSOLUTE: 0.7 K/UL (ref 0–1.3)
MONOCYTES ABSOLUTE: 0.9 K/UL (ref 0–1.3)
MONOCYTES ABSOLUTE: 1 K/UL (ref 0–1.3)
MONOCYTES ABSOLUTE: 1.1 K/UL (ref 0–1.3)
MONOCYTES ABSOLUTE: 1.4 K/UL (ref 0–1.3)
MONOCYTES ABSOLUTE: 1.7 K/UL (ref 0–1.3)
MONOCYTES RELATIVE PERCENT: 0 %
MONOCYTES RELATIVE PERCENT: 3.4 %
MONOCYTES RELATIVE PERCENT: 4.2 %
MONOCYTES RELATIVE PERCENT: 4.6 %
MONOCYTES RELATIVE PERCENT: 5 %
MONOCYTES RELATIVE PERCENT: 5.1 %
MONOCYTES RELATIVE PERCENT: 7 %
NEUTROPHILS ABSOLUTE: 13.9 K/UL (ref 1.7–7.7)
NEUTROPHILS ABSOLUTE: 15.4 K/UL (ref 1.7–7.7)
NEUTROPHILS ABSOLUTE: 16.5 K/UL (ref 1.7–7.7)
NEUTROPHILS ABSOLUTE: 16.9 K/UL (ref 1.7–7.7)
NEUTROPHILS ABSOLUTE: 18.3 K/UL (ref 1.7–7.7)
NEUTROPHILS ABSOLUTE: 21 K/UL (ref 1.7–7.7)
NEUTROPHILS ABSOLUTE: 21.5 K/UL (ref 1.7–7.7)
NEUTROPHILS RELATIVE PERCENT: 70 %
NEUTROPHILS RELATIVE PERCENT: 75.7 %
NEUTROPHILS RELATIVE PERCENT: 76.4 %
NEUTROPHILS RELATIVE PERCENT: 78 %
NEUTROPHILS RELATIVE PERCENT: 78.4 %
NEUTROPHILS RELATIVE PERCENT: 78.8 %
NEUTROPHILS RELATIVE PERCENT: 79 %
NITRITE, URINE: NEGATIVE
NITRITE, URINE: NEGATIVE
O2 SAT, VEN: 55 %
OPIATE SCREEN URINE: ABNORMAL
OXYCODONE URINE: ABNORMAL
PCO2, VEN: 36.3 MM HG (ref 40–50)
PDW BLD-RTO: 15.5 % (ref 12.4–15.4)
PDW BLD-RTO: 15.6 % (ref 12.4–15.4)
PDW BLD-RTO: 15.7 % (ref 12.4–15.4)
PDW BLD-RTO: 15.7 % (ref 12.4–15.4)
PDW BLD-RTO: 15.8 % (ref 12.4–15.4)
PDW BLD-RTO: 16.1 % (ref 12.4–15.4)
PDW BLD-RTO: 16.2 % (ref 12.4–15.4)
PERFORMED ON: ABNORMAL
PERFORMED ON: NORMAL
PH UA: 5
PH UA: 6
PH UA: 6
PH VENOUS: 7.3 (ref 7.35–7.45)
PH VENOUS: 7.4 (ref 7.35–7.45)
PHENCYCLIDINE SCREEN URINE: ABNORMAL
PHOSPHORUS: 1.5 MG/DL (ref 2.5–4.9)
PHOSPHORUS: 1.6 MG/DL (ref 2.5–4.9)
PHOSPHORUS: 1.8 MG/DL (ref 2.5–4.9)
PHOSPHORUS: 1.8 MG/DL (ref 2.5–4.9)
PHOSPHORUS: 2.2 MG/DL (ref 2.5–4.9)
PHOSPHORUS: 2.3 MG/DL (ref 2.5–4.9)
PHOSPHORUS: 2.5 MG/DL (ref 2.5–4.9)
PHOSPHORUS: 2.6 MG/DL (ref 2.5–4.9)
PLATELET # BLD: 108 K/UL (ref 135–450)
PLATELET # BLD: 118 K/UL (ref 135–450)
PLATELET # BLD: 130 K/UL (ref 135–450)
PLATELET # BLD: 203 K/UL (ref 135–450)
PLATELET # BLD: 298 K/UL (ref 135–450)
PLATELET # BLD: 362 K/UL (ref 135–450)
PLATELET # BLD: 364 K/UL (ref 135–450)
PMV BLD AUTO: 7.6 FL (ref 5–10.5)
PMV BLD AUTO: 7.8 FL (ref 5–10.5)
PMV BLD AUTO: 8.5 FL (ref 5–10.5)
PMV BLD AUTO: 8.6 FL (ref 5–10.5)
PMV BLD AUTO: 9 FL (ref 5–10.5)
PMV BLD AUTO: 9.1 FL (ref 5–10.5)
PMV BLD AUTO: 9.3 FL (ref 5–10.5)
PO2, VEN: 29 MM HG
POC ANION GAP: 13 (ref 10–20)
POC BUN: 27 MG/DL (ref 7–18)
POC CHLORIDE: 90 MMOL/L (ref 99–110)
POC CREATININE: 2.3 MG/DL (ref 0.8–1.3)
POC POTASSIUM: 4.2 MMOL/L (ref 3.5–5.1)
POC SAMPLE TYPE: ABNORMAL
POC SAMPLE TYPE: NORMAL
POC SODIUM: 126 MMOL/L (ref 136–145)
POC TROPONIN I: 0.09 NG/ML (ref 0–0.1)
POTASSIUM REFLEX MAGNESIUM: 3.6 MMOL/L (ref 3.5–5.1)
POTASSIUM SERPL-SCNC: 3.3 MMOL/L (ref 3.5–5.1)
POTASSIUM SERPL-SCNC: 3.4 MMOL/L (ref 3.5–5.1)
POTASSIUM SERPL-SCNC: 3.5 MMOL/L (ref 3.5–5.1)
POTASSIUM SERPL-SCNC: 3.5 MMOL/L (ref 3.5–5.1)
POTASSIUM SERPL-SCNC: 3.7 MMOL/L (ref 3.5–5.1)
POTASSIUM SERPL-SCNC: 3.7 MMOL/L (ref 3.5–5.1)
POTASSIUM SERPL-SCNC: 3.8 MMOL/L (ref 3.5–5.1)
POTASSIUM SERPL-SCNC: 4.1 MMOL/L (ref 3.5–5.1)
PRO-BNP: 4208 PG/ML (ref 0–124)
PROPOXYPHENE SCREEN: ABNORMAL
PROTEIN PROTEIN: 101.1 MG/DL
PROTEIN UA: 100 MG/DL
PROTEIN UA: 100 MG/DL
PROTHROMBIN TIME: 12.3 SEC (ref 9.8–13)
RBC # BLD: 3.5 M/UL (ref 4.2–5.9)
RBC # BLD: 3.97 M/UL (ref 4.2–5.9)
RBC # BLD: 4.04 M/UL (ref 4.2–5.9)
RBC # BLD: 4.13 M/UL (ref 4.2–5.9)
RBC # BLD: 4.14 M/UL (ref 4.2–5.9)
RBC # BLD: 4.52 M/UL (ref 4.2–5.9)
RBC # BLD: 4.68 M/UL (ref 4.2–5.9)
RBC UA: ABNORMAL /HPF (ref 0–2)
RBC UA: ABNORMAL /HPF (ref 0–2)
SLIDE REVIEW: ABNORMAL
SMUDGE CELLS: PRESENT
SODIUM BLD-SCNC: 127 MMOL/L (ref 136–145)
SODIUM BLD-SCNC: 128 MMOL/L (ref 136–145)
SODIUM BLD-SCNC: 129 MMOL/L (ref 136–145)
SODIUM BLD-SCNC: 130 MMOL/L (ref 136–145)
SODIUM BLD-SCNC: 131 MMOL/L (ref 136–145)
SODIUM BLD-SCNC: 131 MMOL/L (ref 136–145)
SODIUM BLD-SCNC: 132 MMOL/L (ref 136–145)
SODIUM BLD-SCNC: 132 MMOL/L (ref 136–145)
SODIUM BLD-SCNC: 135 MMOL/L (ref 136–145)
SODIUM URINE: 29 MMOL/L
SPECIFIC GRAVITY UA: 1.02
SPECIFIC GRAVITY UA: 1.02
TCO2 CALC VENOUS: 23 MMOL/L
TEAR DROP CELLS: ABNORMAL
TEAR DROP CELLS: ABNORMAL
TOTAL CK: 642 U/L (ref 39–308)
TOTAL CK: 729 U/L (ref 39–308)
TOTAL CK: 843 U/L (ref 39–308)
TOTAL CK: 862 U/L (ref 39–308)
TOXIC GRANULATION: PRESENT
TOXIC GRANULATION: PRESENT
TROPONIN: 0.03 NG/ML
TROPONIN: 0.03 NG/ML
TROPONIN: 0.05 NG/ML
URINE CULTURE, ROUTINE: NORMAL
URINE REFLEX TO CULTURE: ABNORMAL
URINE TYPE: ABNORMAL
URINE TYPE: ABNORMAL
UROBILINOGEN, URINE: 0.2 E.U./DL
UROBILINOGEN, URINE: 0.2 E.U./DL
VALPROIC ACID LEVEL: <2.8 UG/ML (ref 50–100)
WBC # BLD: 17.8 K/UL (ref 4–11)
WBC # BLD: 18.7 K/UL (ref 4–11)
WBC # BLD: 20.1 K/UL (ref 4–11)
WBC # BLD: 21.6 K/UL (ref 4–11)
WBC # BLD: 24.4 K/UL (ref 4–11)
WBC # BLD: 27.3 K/UL (ref 4–11)
WBC # BLD: 27.7 K/UL (ref 4–11)
WBC UA: ABNORMAL /HPF (ref 0–5)
WBC UA: ABNORMAL /HPF (ref 0–5)

## 2018-01-01 PROCEDURE — 99285 EMERGENCY DEPT VISIT HI MDM: CPT

## 2018-01-01 PROCEDURE — 2580000003 HC RX 258: Performed by: STUDENT IN AN ORGANIZED HEALTH CARE EDUCATION/TRAINING PROGRAM

## 2018-01-01 PROCEDURE — 6360000002 HC RX W HCPCS: Performed by: STUDENT IN AN ORGANIZED HEALTH CARE EDUCATION/TRAINING PROGRAM

## 2018-01-01 PROCEDURE — 2580000003 HC RX 258: Performed by: INTERNAL MEDICINE

## 2018-01-01 PROCEDURE — 70551 MRI BRAIN STEM W/O DYE: CPT

## 2018-01-01 PROCEDURE — 1200000000 HC SEMI PRIVATE

## 2018-01-01 PROCEDURE — 6360000002 HC RX W HCPCS: Performed by: INTERNAL MEDICINE

## 2018-01-01 PROCEDURE — 36415 COLL VENOUS BLD VENIPUNCTURE: CPT

## 2018-01-01 PROCEDURE — 2000000000 HC ICU R&B

## 2018-01-01 PROCEDURE — 51798 US URINE CAPACITY MEASURE: CPT

## 2018-01-01 PROCEDURE — 80069 RENAL FUNCTION PANEL: CPT

## 2018-01-01 PROCEDURE — 2060000000 HC ICU INTERMEDIATE R&B

## 2018-01-01 PROCEDURE — 6370000000 HC RX 637 (ALT 250 FOR IP): Performed by: STUDENT IN AN ORGANIZED HEALTH CARE EDUCATION/TRAINING PROGRAM

## 2018-01-01 PROCEDURE — G8978 MOBILITY CURRENT STATUS: HCPCS

## 2018-01-01 PROCEDURE — 99223 1ST HOSP IP/OBS HIGH 75: CPT | Performed by: INTERNAL MEDICINE

## 2018-01-01 PROCEDURE — 84484 ASSAY OF TROPONIN QUANT: CPT

## 2018-01-01 PROCEDURE — 82803 BLOOD GASES ANY COMBINATION: CPT

## 2018-01-01 PROCEDURE — 84300 ASSAY OF URINE SODIUM: CPT

## 2018-01-01 PROCEDURE — 2580000003 HC RX 258

## 2018-01-01 PROCEDURE — 70496 CT ANGIOGRAPHY HEAD: CPT

## 2018-01-01 PROCEDURE — 70498 CT ANGIOGRAPHY NECK: CPT

## 2018-01-01 PROCEDURE — 97530 THERAPEUTIC ACTIVITIES: CPT

## 2018-01-01 PROCEDURE — 70450 CT HEAD/BRAIN W/O DYE: CPT

## 2018-01-01 PROCEDURE — 99232 SBSQ HOSP IP/OBS MODERATE 35: CPT | Performed by: INTERNAL MEDICINE

## 2018-01-01 PROCEDURE — 93005 ELECTROCARDIOGRAM TRACING: CPT | Performed by: STUDENT IN AN ORGANIZED HEALTH CARE EDUCATION/TRAINING PROGRAM

## 2018-01-01 PROCEDURE — G8987 SELF CARE CURRENT STATUS: HCPCS

## 2018-01-01 PROCEDURE — 6370000000 HC RX 637 (ALT 250 FOR IP): Performed by: INTERNAL MEDICINE

## 2018-01-01 PROCEDURE — 85025 COMPLETE CBC W/AUTO DIFF WBC: CPT

## 2018-01-01 PROCEDURE — 83735 ASSAY OF MAGNESIUM: CPT

## 2018-01-01 PROCEDURE — 82550 ASSAY OF CK (CPK): CPT

## 2018-01-01 PROCEDURE — 87040 BLOOD CULTURE FOR BACTERIA: CPT

## 2018-01-01 PROCEDURE — 2500000003 HC RX 250 WO HCPCS: Performed by: STUDENT IN AN ORGANIZED HEALTH CARE EDUCATION/TRAINING PROGRAM

## 2018-01-01 PROCEDURE — 95951 HC EEG MONITOR/VIDEO LESS THAN 24: CPT

## 2018-01-01 PROCEDURE — 82570 ASSAY OF URINE CREATININE: CPT

## 2018-01-01 PROCEDURE — 81001 URINALYSIS AUTO W/SCOPE: CPT

## 2018-01-01 PROCEDURE — 85730 THROMBOPLASTIN TIME PARTIAL: CPT

## 2018-01-01 PROCEDURE — 83880 ASSAY OF NATRIURETIC PEPTIDE: CPT

## 2018-01-01 PROCEDURE — 6360000004 HC RX CONTRAST MEDICATION: Performed by: EMERGENCY MEDICINE

## 2018-01-01 PROCEDURE — 71045 X-RAY EXAM CHEST 1 VIEW: CPT

## 2018-01-01 PROCEDURE — 96360 HYDRATION IV INFUSION INIT: CPT

## 2018-01-01 PROCEDURE — 2500000003 HC RX 250 WO HCPCS: Performed by: INTERNAL MEDICINE

## 2018-01-01 PROCEDURE — 97166 OT EVAL MOD COMPLEX 45 MIN: CPT

## 2018-01-01 PROCEDURE — 2580000003 HC RX 258: Performed by: EMERGENCY MEDICINE

## 2018-01-01 PROCEDURE — 95813 EEG EXTND MNTR 61-119 MIN: CPT

## 2018-01-01 PROCEDURE — G8979 MOBILITY GOAL STATUS: HCPCS

## 2018-01-01 PROCEDURE — 51702 INSERT TEMP BLADDER CATH: CPT

## 2018-01-01 PROCEDURE — 83605 ASSAY OF LACTIC ACID: CPT

## 2018-01-01 PROCEDURE — G8988 SELF CARE GOAL STATUS: HCPCS

## 2018-01-01 PROCEDURE — 80307 DRUG TEST PRSMV CHEM ANLYZR: CPT

## 2018-01-01 PROCEDURE — 80177 DRUG SCRN QUAN LEVETIRACETAM: CPT

## 2018-01-01 PROCEDURE — 95819 EEG AWAKE AND ASLEEP: CPT

## 2018-01-01 PROCEDURE — 82140 ASSAY OF AMMONIA: CPT

## 2018-01-01 PROCEDURE — 71046 X-RAY EXAM CHEST 2 VIEWS: CPT

## 2018-01-01 PROCEDURE — 84156 ASSAY OF PROTEIN URINE: CPT

## 2018-01-01 PROCEDURE — 97162 PT EVAL MOD COMPLEX 30 MIN: CPT

## 2018-01-01 PROCEDURE — 80164 ASSAY DIPROPYLACETIC ACD TOT: CPT

## 2018-01-01 PROCEDURE — 94761 N-INVAS EAR/PLS OXIMETRY MLT: CPT

## 2018-01-01 PROCEDURE — 94760 N-INVAS EAR/PLS OXIMETRY 1: CPT

## 2018-01-01 PROCEDURE — 93970 EXTREMITY STUDY: CPT

## 2018-01-01 PROCEDURE — 85610 PROTHROMBIN TIME: CPT

## 2018-01-01 PROCEDURE — 93306 TTE W/DOPPLER COMPLETE: CPT

## 2018-01-01 PROCEDURE — 82436 ASSAY OF URINE CHLORIDE: CPT

## 2018-01-01 PROCEDURE — 87086 URINE CULTURE/COLONY COUNT: CPT

## 2018-01-01 RX ORDER — BUSPIRONE HYDROCHLORIDE 10 MG/1
10 TABLET ORAL DAILY
Status: DISCONTINUED | OUTPATIENT
Start: 2018-01-01 | End: 2018-01-01 | Stop reason: HOSPADM

## 2018-01-01 RX ORDER — SODIUM CHLORIDE 9 MG/ML
INJECTION, SOLUTION INTRAVENOUS
Status: COMPLETED
Start: 2018-01-01 | End: 2018-01-01

## 2018-01-01 RX ORDER — LEVETIRACETAM 250 MG/1
250 TABLET ORAL 2 TIMES DAILY
Status: ON HOLD | COMMUNITY
End: 2018-01-01 | Stop reason: HOSPADM

## 2018-01-01 RX ORDER — CLINDAMYCIN HYDROCHLORIDE 300 MG/1
300 CAPSULE ORAL EVERY 8 HOURS SCHEDULED
Qty: 21 CAPSULE | Refills: 0 | Status: SHIPPED | OUTPATIENT
Start: 2018-01-01 | End: 2018-01-01

## 2018-01-01 RX ORDER — 0.9 % SODIUM CHLORIDE 0.9 %
1000 INTRAVENOUS SOLUTION INTRAVENOUS ONCE
Status: COMPLETED | OUTPATIENT
Start: 2018-01-01 | End: 2018-01-01

## 2018-01-01 RX ORDER — LOSARTAN POTASSIUM 25 MG/1
25 TABLET ORAL DAILY
Status: CANCELLED | OUTPATIENT
Start: 2018-01-01

## 2018-01-01 RX ORDER — FLUTICASONE PROPIONATE 50 MCG
1 SPRAY, SUSPENSION (ML) NASAL DAILY
COMMUNITY

## 2018-01-01 RX ORDER — LEVETIRACETAM 750 MG/1
750 TABLET ORAL 2 TIMES DAILY
Qty: 60 TABLET | Refills: 3 | Status: SHIPPED | OUTPATIENT
Start: 2018-01-01

## 2018-01-01 RX ORDER — SODIUM CHLORIDE, SODIUM LACTATE, POTASSIUM CHLORIDE, AND CALCIUM CHLORIDE .6; .31; .03; .02 G/100ML; G/100ML; G/100ML; G/100ML
1000 INJECTION, SOLUTION INTRAVENOUS ONCE
Status: COMPLETED | OUTPATIENT
Start: 2018-01-01 | End: 2018-01-01

## 2018-01-01 RX ORDER — FINASTERIDE 5 MG/1
5 TABLET, FILM COATED ORAL DAILY
Status: DISCONTINUED | OUTPATIENT
Start: 2018-01-01 | End: 2018-01-01 | Stop reason: HOSPADM

## 2018-01-01 RX ORDER — KETOROLAC TROMETHAMINE 30 MG/ML
15 INJECTION, SOLUTION INTRAMUSCULAR; INTRAVENOUS ONCE
Status: COMPLETED | OUTPATIENT
Start: 2018-01-01 | End: 2018-01-01

## 2018-01-01 RX ORDER — ATORVASTATIN CALCIUM 40 MG/1
40 TABLET, FILM COATED ORAL NIGHTLY
Qty: 30 TABLET | Refills: 3 | Status: SHIPPED | OUTPATIENT
Start: 2018-01-01

## 2018-01-01 RX ORDER — ASPIRIN 81 MG/1
81 TABLET, CHEWABLE ORAL DAILY
Status: DISCONTINUED | OUTPATIENT
Start: 2018-01-01 | End: 2018-01-01 | Stop reason: HOSPADM

## 2018-01-01 RX ORDER — DEXTROSE MONOHYDRATE 50 MG/ML
100 INJECTION, SOLUTION INTRAVENOUS PRN
Status: DISCONTINUED | OUTPATIENT
Start: 2018-01-01 | End: 2018-01-01 | Stop reason: HOSPADM

## 2018-01-01 RX ORDER — FLUTICASONE PROPIONATE 50 MCG
1 SPRAY, SUSPENSION (ML) NASAL DAILY
Status: DISCONTINUED | OUTPATIENT
Start: 2018-01-01 | End: 2018-01-01 | Stop reason: HOSPADM

## 2018-01-01 RX ORDER — POLYETHYLENE GLYCOL 3350 17 G/17G
17 POWDER, FOR SOLUTION ORAL DAILY
Status: DISCONTINUED | OUTPATIENT
Start: 2018-01-01 | End: 2018-01-01 | Stop reason: HOSPADM

## 2018-01-01 RX ORDER — SODIUM CHLORIDE 9 MG/ML
INJECTION, SOLUTION INTRAVENOUS CONTINUOUS
Status: DISPENSED | OUTPATIENT
Start: 2018-01-01 | End: 2018-01-01

## 2018-01-01 RX ORDER — LEVETIRACETAM 10 MG/ML
1000 INJECTION INTRAVASCULAR EVERY 12 HOURS
Status: DISCONTINUED | OUTPATIENT
Start: 2018-01-01 | End: 2018-01-01 | Stop reason: CLARIF

## 2018-01-01 RX ORDER — NICOTINE POLACRILEX 4 MG
15 LOZENGE BUCCAL PRN
Status: DISCONTINUED | OUTPATIENT
Start: 2018-01-01 | End: 2018-01-01 | Stop reason: HOSPADM

## 2018-01-01 RX ORDER — SODIUM CHLORIDE 9 MG/ML
INJECTION, SOLUTION INTRAVENOUS CONTINUOUS
Status: DISCONTINUED | OUTPATIENT
Start: 2018-01-01 | End: 2018-01-01

## 2018-01-01 RX ORDER — ONDANSETRON 2 MG/ML
4 INJECTION INTRAMUSCULAR; INTRAVENOUS EVERY 6 HOURS PRN
Status: DISCONTINUED | OUTPATIENT
Start: 2018-01-01 | End: 2018-01-01 | Stop reason: HOSPADM

## 2018-01-01 RX ORDER — CLINDAMYCIN HYDROCHLORIDE 300 MG/1
300 CAPSULE ORAL EVERY 8 HOURS SCHEDULED
Status: DISCONTINUED | OUTPATIENT
Start: 2018-01-01 | End: 2018-01-01 | Stop reason: HOSPADM

## 2018-01-01 RX ORDER — DEXTROSE MONOHYDRATE 25 G/50ML
12.5 INJECTION, SOLUTION INTRAVENOUS PRN
Status: DISCONTINUED | OUTPATIENT
Start: 2018-01-01 | End: 2018-01-01 | Stop reason: HOSPADM

## 2018-01-01 RX ORDER — MELOXICAM 15 MG/1
15 TABLET ORAL DAILY
Status: ON HOLD | COMMUNITY
End: 2018-01-01 | Stop reason: HOSPADM

## 2018-01-01 RX ORDER — POTASSIUM CHLORIDE 20 MEQ/1
40 TABLET, EXTENDED RELEASE ORAL ONCE
Status: COMPLETED | OUTPATIENT
Start: 2018-01-01 | End: 2018-01-01

## 2018-01-01 RX ORDER — MAGNESIUM SULFATE 1 G/100ML
1 INJECTION INTRAVENOUS
Status: COMPLETED | OUTPATIENT
Start: 2018-01-01 | End: 2018-01-01

## 2018-01-01 RX ORDER — CLOPIDOGREL BISULFATE 75 MG/1
TABLET ORAL
Qty: 7 TABLET | Refills: 0 | OUTPATIENT
Start: 2018-01-01

## 2018-01-01 RX ORDER — ACETAMINOPHEN 325 MG/1
650 TABLET ORAL EVERY 4 HOURS PRN
Status: DISCONTINUED | OUTPATIENT
Start: 2018-01-01 | End: 2018-01-01

## 2018-01-01 RX ORDER — LEVETIRACETAM 750 MG/1
750 TABLET ORAL ONCE
Status: DISCONTINUED | OUTPATIENT
Start: 2018-01-01 | End: 2018-01-01 | Stop reason: SDUPTHER

## 2018-01-01 RX ORDER — POTASSIUM CHLORIDE 20 MEQ/1
20 TABLET, EXTENDED RELEASE ORAL 2 TIMES DAILY WITH MEALS
Status: DISCONTINUED | OUTPATIENT
Start: 2018-01-01 | End: 2018-01-01

## 2018-01-01 RX ORDER — INSULIN GLARGINE 100 [IU]/ML
INJECTION, SOLUTION SUBCUTANEOUS
Status: ON HOLD | COMMUNITY
End: 2018-01-01 | Stop reason: HOSPADM

## 2018-01-01 RX ORDER — HEPARIN SODIUM 5000 [USP'U]/ML
5000 INJECTION, SOLUTION INTRAVENOUS; SUBCUTANEOUS EVERY 8 HOURS SCHEDULED
Status: DISCONTINUED | OUTPATIENT
Start: 2018-01-01 | End: 2018-01-01 | Stop reason: HOSPADM

## 2018-01-01 RX ORDER — TAMSULOSIN HYDROCHLORIDE 0.4 MG/1
0.4 CAPSULE ORAL DAILY
COMMUNITY

## 2018-01-01 RX ORDER — LEVETIRACETAM 250 MG/1
1000 TABLET ORAL ONCE
Status: CANCELLED | OUTPATIENT
Start: 2018-01-01

## 2018-01-01 RX ORDER — TAMSULOSIN HYDROCHLORIDE 0.4 MG/1
0.4 CAPSULE ORAL DAILY
Status: DISCONTINUED | OUTPATIENT
Start: 2018-01-01 | End: 2018-01-01 | Stop reason: HOSPADM

## 2018-01-01 RX ORDER — MORPHINE SULFATE 2 MG/ML
1 INJECTION, SOLUTION INTRAMUSCULAR; INTRAVENOUS ONCE
Status: COMPLETED | OUTPATIENT
Start: 2018-01-01 | End: 2018-01-01

## 2018-01-01 RX ORDER — ATORVASTATIN CALCIUM 40 MG/1
40 TABLET, FILM COATED ORAL NIGHTLY
Status: DISCONTINUED | OUTPATIENT
Start: 2018-01-01 | End: 2018-01-01 | Stop reason: HOSPADM

## 2018-01-01 RX ORDER — SODIUM CHLORIDE 0.9 % (FLUSH) 0.9 %
10 SYRINGE (ML) INJECTION EVERY 12 HOURS SCHEDULED
Status: DISCONTINUED | OUTPATIENT
Start: 2018-01-01 | End: 2018-01-01 | Stop reason: HOSPADM

## 2018-01-01 RX ORDER — POTASSIUM CHLORIDE 7.45 MG/ML
40 INJECTION INTRAVENOUS ONCE
Status: COMPLETED | OUTPATIENT
Start: 2018-01-01 | End: 2018-01-01

## 2018-01-01 RX ORDER — SODIUM CHLORIDE 0.9 % (FLUSH) 0.9 %
10 SYRINGE (ML) INJECTION PRN
Status: DISCONTINUED | OUTPATIENT
Start: 2018-01-01 | End: 2018-01-01 | Stop reason: HOSPADM

## 2018-01-01 RX ORDER — ACETAMINOPHEN 325 MG/1
650 TABLET ORAL EVERY 4 HOURS PRN
Status: DISCONTINUED | OUTPATIENT
Start: 2018-01-01 | End: 2018-01-01 | Stop reason: HOSPADM

## 2018-01-01 RX ORDER — LEVETIRACETAM 750 MG/1
750 TABLET ORAL 2 TIMES DAILY
Status: DISCONTINUED | OUTPATIENT
Start: 2018-01-01 | End: 2018-01-01 | Stop reason: HOSPADM

## 2018-01-01 RX ADMIN — TAMSULOSIN HYDROCHLORIDE 0.4 MG: 0.4 CAPSULE ORAL at 21:13

## 2018-01-01 RX ADMIN — HEPARIN SODIUM 5000 UNITS: 5000 INJECTION INTRAVENOUS; SUBCUTANEOUS at 14:52

## 2018-01-01 RX ADMIN — ASPIRIN 81 MG CHEWABLE TABLET 81 MG: 81 TABLET CHEWABLE at 09:31

## 2018-01-01 RX ADMIN — ACETAMINOPHEN 650 MG: 325 TABLET ORAL at 22:29

## 2018-01-01 RX ADMIN — HEPARIN SODIUM 5000 UNITS: 5000 INJECTION INTRAVENOUS; SUBCUTANEOUS at 06:02

## 2018-01-01 RX ADMIN — POLYETHYLENE GLYCOL 3350 17 G: 17 POWDER, FOR SOLUTION ORAL at 08:16

## 2018-01-01 RX ADMIN — Medication 10 ML: at 10:05

## 2018-01-01 RX ADMIN — CEFTRIAXONE SODIUM 2 G: 2 INJECTION, POWDER, FOR SOLUTION INTRAMUSCULAR; INTRAVENOUS at 18:07

## 2018-01-01 RX ADMIN — ATORVASTATIN CALCIUM 40 MG: 40 TABLET, FILM COATED ORAL at 21:46

## 2018-01-01 RX ADMIN — HEPARIN SODIUM 5000 UNITS: 5000 INJECTION INTRAVENOUS; SUBCUTANEOUS at 06:14

## 2018-01-01 RX ADMIN — POTASSIUM CHLORIDE 20 MEQ: 20 TABLET, EXTENDED RELEASE ORAL at 18:16

## 2018-01-01 RX ADMIN — FLUTICASONE PROPIONATE 1 SPRAY: 50 SPRAY, METERED NASAL at 08:23

## 2018-01-01 RX ADMIN — HEPARIN SODIUM 5000 UNITS: 5000 INJECTION INTRAVENOUS; SUBCUTANEOUS at 21:13

## 2018-01-01 RX ADMIN — POTASSIUM CHLORIDE 40 MEQ: 10 INJECTION, SOLUTION INTRAVENOUS at 18:36

## 2018-01-01 RX ADMIN — BUSPIRONE HYDROCHLORIDE 10 MG: 10 TABLET ORAL at 14:52

## 2018-01-01 RX ADMIN — HEPARIN SODIUM 5000 UNITS: 5000 INJECTION INTRAVENOUS; SUBCUTANEOUS at 14:07

## 2018-01-01 RX ADMIN — BUSPIRONE HYDROCHLORIDE 10 MG: 10 TABLET ORAL at 08:16

## 2018-01-01 RX ADMIN — METRONIDAZOLE 500 MG: 500 INJECTION, SOLUTION INTRAVENOUS at 21:27

## 2018-01-01 RX ADMIN — VANCOMYCIN HYDROCHLORIDE 1000 MG: 10 INJECTION, POWDER, LYOPHILIZED, FOR SOLUTION INTRAVENOUS at 22:11

## 2018-01-01 RX ADMIN — BUSPIRONE HYDROCHLORIDE 10 MG: 10 TABLET ORAL at 09:51

## 2018-01-01 RX ADMIN — ATORVASTATIN CALCIUM 40 MG: 40 TABLET, FILM COATED ORAL at 21:17

## 2018-01-01 RX ADMIN — LEVETIRACETAM 750 MG: 100 INJECTION, SOLUTION INTRAVENOUS at 09:45

## 2018-01-01 RX ADMIN — SODIUM CHLORIDE 500 MG: 900 INJECTION, SOLUTION INTRAVENOUS at 08:55

## 2018-01-01 RX ADMIN — CEFTRIAXONE SODIUM 2 G: 2 INJECTION, POWDER, FOR SOLUTION INTRAMUSCULAR; INTRAVENOUS at 17:23

## 2018-01-01 RX ADMIN — FINASTERIDE 5 MG: 5 TABLET, FILM COATED ORAL at 09:31

## 2018-01-01 RX ADMIN — ATORVASTATIN CALCIUM 40 MG: 40 TABLET, FILM COATED ORAL at 21:43

## 2018-01-01 RX ADMIN — HEPARIN SODIUM 5000 UNITS: 5000 INJECTION INTRAVENOUS; SUBCUTANEOUS at 06:04

## 2018-01-01 RX ADMIN — Medication 10 ML: at 11:26

## 2018-01-01 RX ADMIN — VANCOMYCIN HYDROCHLORIDE 1000 MG: 10 INJECTION, POWDER, LYOPHILIZED, FOR SOLUTION INTRAVENOUS at 00:42

## 2018-01-01 RX ADMIN — Medication 10 ML: at 08:25

## 2018-01-01 RX ADMIN — INSULIN LISPRO 1 UNITS: 100 INJECTION, SOLUTION INTRAVENOUS; SUBCUTANEOUS at 22:51

## 2018-01-01 RX ADMIN — FINASTERIDE 5 MG: 5 TABLET, FILM COATED ORAL at 10:02

## 2018-01-01 RX ADMIN — ASPIRIN 81 MG CHEWABLE TABLET 81 MG: 81 TABLET CHEWABLE at 09:51

## 2018-01-01 RX ADMIN — SODIUM CHLORIDE: 9 INJECTION, SOLUTION INTRAVENOUS at 21:29

## 2018-01-01 RX ADMIN — ACETAMINOPHEN 650 MG: 325 TABLET ORAL at 10:01

## 2018-01-01 RX ADMIN — ASPIRIN 81 MG CHEWABLE TABLET 81 MG: 81 TABLET CHEWABLE at 08:12

## 2018-01-01 RX ADMIN — FLUTICASONE PROPIONATE 1 SPRAY: 50 SPRAY, METERED NASAL at 21:14

## 2018-01-01 RX ADMIN — ASPIRIN 81 MG CHEWABLE TABLET 81 MG: 81 TABLET CHEWABLE at 09:18

## 2018-01-01 RX ADMIN — LEVETIRACETAM 750 MG: 100 INJECTION, SOLUTION INTRAVENOUS at 19:45

## 2018-01-01 RX ADMIN — KETOROLAC TROMETHAMINE 15 MG: 30 INJECTION, SOLUTION INTRAMUSCULAR at 23:35

## 2018-01-01 RX ADMIN — TAMSULOSIN HYDROCHLORIDE 0.4 MG: 0.4 CAPSULE ORAL at 09:31

## 2018-01-01 RX ADMIN — POLYETHYLENE GLYCOL 3350 17 G: 17 POWDER, FOR SOLUTION ORAL at 21:29

## 2018-01-01 RX ADMIN — LEVETIRACETAM 750 MG: 100 INJECTION, SOLUTION INTRAVENOUS at 21:31

## 2018-01-01 RX ADMIN — HEPARIN SODIUM 5000 UNITS: 5000 INJECTION INTRAVENOUS; SUBCUTANEOUS at 21:44

## 2018-01-01 RX ADMIN — DEXTROSE MONOHYDRATE 12.5 G: 25 INJECTION, SOLUTION INTRAVENOUS at 08:28

## 2018-01-01 RX ADMIN — HEPARIN SODIUM 5000 UNITS: 5000 INJECTION INTRAVENOUS; SUBCUTANEOUS at 21:17

## 2018-01-01 RX ADMIN — CEFTRIAXONE SODIUM 2 G: 2 INJECTION, POWDER, FOR SOLUTION INTRAMUSCULAR; INTRAVENOUS at 17:27

## 2018-01-01 RX ADMIN — SODIUM CHLORIDE 1000 ML: 9 INJECTION, SOLUTION INTRAVENOUS at 14:12

## 2018-01-01 RX ADMIN — ATORVASTATIN CALCIUM 40 MG: 40 TABLET, FILM COATED ORAL at 19:45

## 2018-01-01 RX ADMIN — INSULIN LISPRO 2 UNITS: 100 INJECTION, SOLUTION INTRAVENOUS; SUBCUTANEOUS at 14:08

## 2018-01-01 RX ADMIN — HEPARIN SODIUM 5000 UNITS: 5000 INJECTION INTRAVENOUS; SUBCUTANEOUS at 14:29

## 2018-01-01 RX ADMIN — CEFAZOLIN SODIUM 1 G: 1 POWDER, FOR SOLUTION INTRAMUSCULAR; INTRAVENOUS at 16:23

## 2018-01-01 RX ADMIN — HEPARIN SODIUM 5000 UNITS: 5000 INJECTION INTRAVENOUS; SUBCUTANEOUS at 06:08

## 2018-01-01 RX ADMIN — CALCIUM GLUCONATE 1 G: 94 INJECTION, SOLUTION INTRAVENOUS at 15:45

## 2018-01-01 RX ADMIN — Medication 10 ML: at 21:25

## 2018-01-01 RX ADMIN — FINASTERIDE 5 MG: 5 TABLET, FILM COATED ORAL at 09:18

## 2018-01-01 RX ADMIN — BUSPIRONE HYDROCHLORIDE 10 MG: 10 TABLET ORAL at 09:31

## 2018-01-01 RX ADMIN — HEPARIN SODIUM 5000 UNITS: 5000 INJECTION INTRAVENOUS; SUBCUTANEOUS at 15:06

## 2018-01-01 RX ADMIN — FINASTERIDE 5 MG: 5 TABLET, FILM COATED ORAL at 10:05

## 2018-01-01 RX ADMIN — BUSPIRONE HYDROCHLORIDE 10 MG: 10 TABLET ORAL at 10:05

## 2018-01-01 RX ADMIN — METRONIDAZOLE 500 MG: 500 INJECTION, SOLUTION INTRAVENOUS at 03:02

## 2018-01-01 RX ADMIN — CLINDAMYCIN HYDROCHLORIDE 300 MG: 300 CAPSULE ORAL at 14:22

## 2018-01-01 RX ADMIN — HEPARIN SODIUM 5000 UNITS: 5000 INJECTION INTRAVENOUS; SUBCUTANEOUS at 15:09

## 2018-01-01 RX ADMIN — CEFEPIME HYDROCHLORIDE 2 G: 2 INJECTION, POWDER, FOR SOLUTION INTRAVENOUS at 06:01

## 2018-01-01 RX ADMIN — LEVETIRACETAM 750 MG: 100 INJECTION, SOLUTION INTRAVENOUS at 22:29

## 2018-01-01 RX ADMIN — INSULIN GLARGINE 30 UNITS: 100 INJECTION, SOLUTION SUBCUTANEOUS at 21:18

## 2018-01-01 RX ADMIN — ASPIRIN 81 MG CHEWABLE TABLET 81 MG: 81 TABLET CHEWABLE at 10:01

## 2018-01-01 RX ADMIN — Medication 10 ML: at 21:17

## 2018-01-01 RX ADMIN — LEVETIRACETAM 750 MG: 750 TABLET ORAL at 09:18

## 2018-01-01 RX ADMIN — LEVETIRACETAM 750 MG: 100 INJECTION, SOLUTION INTRAVENOUS at 11:50

## 2018-01-01 RX ADMIN — TAMSULOSIN HYDROCHLORIDE 0.4 MG: 0.4 CAPSULE ORAL at 10:05

## 2018-01-01 RX ADMIN — POLYETHYLENE GLYCOL 3350 17 G: 17 POWDER, FOR SOLUTION ORAL at 09:18

## 2018-01-01 RX ADMIN — INSULIN GLARGINE 30 UNITS: 100 INJECTION, SOLUTION SUBCUTANEOUS at 21:13

## 2018-01-01 RX ADMIN — INSULIN LISPRO 1 UNITS: 100 INJECTION, SOLUTION INTRAVENOUS; SUBCUTANEOUS at 21:49

## 2018-01-01 RX ADMIN — CEFTRIAXONE SODIUM 2 G: 2 INJECTION, POWDER, FOR SOLUTION INTRAMUSCULAR; INTRAVENOUS at 17:56

## 2018-01-01 RX ADMIN — POTASSIUM PHOSPHATE, MONOBASIC AND POTASSIUM PHOSPHATE, DIBASIC 20 MMOL: 224; 236 INJECTION, SOLUTION INTRAVENOUS at 10:57

## 2018-01-01 RX ADMIN — Medication 10 ML: at 21:31

## 2018-01-01 RX ADMIN — VANCOMYCIN HYDROCHLORIDE 1000 MG: 10 INJECTION, POWDER, LYOPHILIZED, FOR SOLUTION INTRAVENOUS at 21:41

## 2018-01-01 RX ADMIN — TAMSULOSIN HYDROCHLORIDE 0.4 MG: 0.4 CAPSULE ORAL at 10:02

## 2018-01-01 RX ADMIN — SODIUM CHLORIDE 500 MG: 900 INJECTION, SOLUTION INTRAVENOUS at 10:56

## 2018-01-01 RX ADMIN — INSULIN LISPRO 2 UNITS: 100 INJECTION, SOLUTION INTRAVENOUS; SUBCUTANEOUS at 17:34

## 2018-01-01 RX ADMIN — Medication 10 ML: at 09:31

## 2018-01-01 RX ADMIN — POLYETHYLENE GLYCOL 3350 17 G: 17 POWDER, FOR SOLUTION ORAL at 10:03

## 2018-01-01 RX ADMIN — VANCOMYCIN HYDROCHLORIDE 1000 MG: 10 INJECTION, POWDER, LYOPHILIZED, FOR SOLUTION INTRAVENOUS at 21:24

## 2018-01-01 RX ADMIN — CEFTRIAXONE SODIUM 2 G: 2 INJECTION, POWDER, FOR SOLUTION INTRAMUSCULAR; INTRAVENOUS at 21:14

## 2018-01-01 RX ADMIN — SODIUM CHLORIDE: 9 INJECTION, SOLUTION INTRAVENOUS at 11:35

## 2018-01-01 RX ADMIN — HEPARIN SODIUM 5000 UNITS: 5000 INJECTION INTRAVENOUS; SUBCUTANEOUS at 21:31

## 2018-01-01 RX ADMIN — INSULIN LISPRO 1 UNITS: 100 INJECTION, SOLUTION INTRAVENOUS; SUBCUTANEOUS at 21:20

## 2018-01-01 RX ADMIN — VANCOMYCIN HYDROCHLORIDE 1500 MG: 10 INJECTION, POWDER, LYOPHILIZED, FOR SOLUTION INTRAVENOUS at 22:44

## 2018-01-01 RX ADMIN — HEPARIN SODIUM 5000 UNITS: 5000 INJECTION INTRAVENOUS; SUBCUTANEOUS at 21:20

## 2018-01-01 RX ADMIN — ONDANSETRON 4 MG: 2 INJECTION INTRAMUSCULAR; INTRAVENOUS at 22:39

## 2018-01-01 RX ADMIN — CLINDAMYCIN HYDROCHLORIDE 300 MG: 300 CAPSULE ORAL at 13:13

## 2018-01-01 RX ADMIN — ACETAMINOPHEN 650 MG: 325 TABLET ORAL at 23:35

## 2018-01-01 RX ADMIN — BUSPIRONE HYDROCHLORIDE 10 MG: 10 TABLET ORAL at 10:02

## 2018-01-01 RX ADMIN — SODIUM CHLORIDE 500 ML: 9 INJECTION, SOLUTION INTRAVENOUS at 11:44

## 2018-01-01 RX ADMIN — FINASTERIDE 5 MG: 5 TABLET, FILM COATED ORAL at 09:51

## 2018-01-01 RX ADMIN — HEPARIN SODIUM 5000 UNITS: 5000 INJECTION INTRAVENOUS; SUBCUTANEOUS at 21:51

## 2018-01-01 RX ADMIN — SODIUM CHLORIDE: 9 INJECTION, SOLUTION INTRAVENOUS at 14:02

## 2018-01-01 RX ADMIN — HEPARIN SODIUM 5000 UNITS: 5000 INJECTION INTRAVENOUS; SUBCUTANEOUS at 05:56

## 2018-01-01 RX ADMIN — LEVETIRACETAM 750 MG: 750 TABLET ORAL at 13:13

## 2018-01-01 RX ADMIN — HEPARIN SODIUM 5000 UNITS: 5000 INJECTION INTRAVENOUS; SUBCUTANEOUS at 21:47

## 2018-01-01 RX ADMIN — ASPIRIN 81 MG CHEWABLE TABLET 81 MG: 81 TABLET CHEWABLE at 08:16

## 2018-01-01 RX ADMIN — TAMSULOSIN HYDROCHLORIDE 0.4 MG: 0.4 CAPSULE ORAL at 09:18

## 2018-01-01 RX ADMIN — MAGNESIUM SULFATE HEPTAHYDRATE 1 G: 1 INJECTION, SOLUTION INTRAVENOUS at 14:02

## 2018-01-01 RX ADMIN — INSULIN LISPRO 2 UNITS: 100 INJECTION, SOLUTION INTRAVENOUS; SUBCUTANEOUS at 17:20

## 2018-01-01 RX ADMIN — Medication 10 ML: at 19:50

## 2018-01-01 RX ADMIN — POTASSIUM CHLORIDE 40 MEQ: 20 TABLET, EXTENDED RELEASE ORAL at 02:38

## 2018-01-01 RX ADMIN — ACETAMINOPHEN 650 MG: 325 TABLET ORAL at 05:10

## 2018-01-01 RX ADMIN — HEPARIN SODIUM 5000 UNITS: 5000 INJECTION INTRAVENOUS; SUBCUTANEOUS at 05:51

## 2018-01-01 RX ADMIN — BUSPIRONE HYDROCHLORIDE 10 MG: 10 TABLET ORAL at 09:18

## 2018-01-01 RX ADMIN — Medication 10 ML: at 08:24

## 2018-01-01 RX ADMIN — FINASTERIDE 5 MG: 5 TABLET, FILM COATED ORAL at 08:16

## 2018-01-01 RX ADMIN — LEVETIRACETAM 1000 MG: 100 INJECTION, SOLUTION INTRAVENOUS at 19:52

## 2018-01-01 RX ADMIN — POLYETHYLENE GLYCOL 3350 17 G: 17 POWDER, FOR SOLUTION ORAL at 09:30

## 2018-01-01 RX ADMIN — BUSPIRONE HYDROCHLORIDE 10 MG: 10 TABLET ORAL at 08:13

## 2018-01-01 RX ADMIN — MAGNESIUM SULFATE HEPTAHYDRATE 1 G: 1 INJECTION, SOLUTION INTRAVENOUS at 15:32

## 2018-01-01 RX ADMIN — ACETAMINOPHEN 650 MG: 325 TABLET ORAL at 17:03

## 2018-01-01 RX ADMIN — ACETAMINOPHEN 650 MG: 325 TABLET ORAL at 11:41

## 2018-01-01 RX ADMIN — POLYETHYLENE GLYCOL 3350 17 G: 17 POWDER, FOR SOLUTION ORAL at 09:08

## 2018-01-01 RX ADMIN — IOPAMIDOL 80 ML: 755 INJECTION, SOLUTION INTRAVENOUS at 12:32

## 2018-01-01 RX ADMIN — SODIUM CHLORIDE: 9 INJECTION, SOLUTION INTRAVENOUS at 14:05

## 2018-01-01 RX ADMIN — ASPIRIN 81 MG CHEWABLE TABLET 81 MG: 81 TABLET CHEWABLE at 10:05

## 2018-01-01 RX ADMIN — SODIUM CHLORIDE: 9 INJECTION, SOLUTION INTRAVENOUS at 22:28

## 2018-01-01 RX ADMIN — SODIUM CHLORIDE 500 MG: 900 INJECTION, SOLUTION INTRAVENOUS at 08:10

## 2018-01-01 RX ADMIN — SODIUM CHLORIDE 500 MG: 900 INJECTION, SOLUTION INTRAVENOUS at 22:11

## 2018-01-01 RX ADMIN — VANCOMYCIN HYDROCHLORIDE 1000 MG: 10 INJECTION, POWDER, LYOPHILIZED, FOR SOLUTION INTRAVENOUS at 00:04

## 2018-01-01 RX ADMIN — HEPARIN SODIUM 5000 UNITS: 5000 INJECTION INTRAVENOUS; SUBCUTANEOUS at 15:50

## 2018-01-01 RX ADMIN — LEVETIRACETAM 750 MG: 750 TABLET ORAL at 21:43

## 2018-01-01 RX ADMIN — ACETAMINOPHEN 650 MG: 325 TABLET ORAL at 08:16

## 2018-01-01 RX ADMIN — TAMSULOSIN HYDROCHLORIDE 0.4 MG: 0.4 CAPSULE ORAL at 09:51

## 2018-01-01 RX ADMIN — ACETAMINOPHEN 650 MG: 325 TABLET ORAL at 16:04

## 2018-01-01 RX ADMIN — INSULIN GLARGINE 30 UNITS: 100 INJECTION, SOLUTION SUBCUTANEOUS at 02:39

## 2018-01-01 RX ADMIN — FINASTERIDE 5 MG: 5 TABLET, FILM COATED ORAL at 14:52

## 2018-01-01 RX ADMIN — SODIUM CHLORIDE: 9 INJECTION, SOLUTION INTRAVENOUS at 19:51

## 2018-01-01 RX ADMIN — PROCHLORPERAZINE EDISYLATE 5 MG: 5 INJECTION INTRAMUSCULAR; INTRAVENOUS at 23:38

## 2018-01-01 RX ADMIN — HEPARIN SODIUM 5000 UNITS: 5000 INJECTION INTRAVENOUS; SUBCUTANEOUS at 06:20

## 2018-01-01 RX ADMIN — TAMSULOSIN HYDROCHLORIDE 0.4 MG: 0.4 CAPSULE ORAL at 08:13

## 2018-01-01 RX ADMIN — FINASTERIDE 5 MG: 5 TABLET, FILM COATED ORAL at 08:12

## 2018-01-01 RX ADMIN — DEXTROSE MONOHYDRATE 12.5 G: 25 INJECTION, SOLUTION INTRAVENOUS at 06:16

## 2018-01-01 RX ADMIN — ASPIRIN 81 MG CHEWABLE TABLET 81 MG: 81 TABLET CHEWABLE at 21:29

## 2018-01-01 RX ADMIN — SODIUM CHLORIDE, POTASSIUM CHLORIDE, SODIUM LACTATE AND CALCIUM CHLORIDE 1000 ML: 600; 310; 30; 20 INJECTION, SOLUTION INTRAVENOUS at 13:48

## 2018-01-01 RX ADMIN — HEPARIN SODIUM 5000 UNITS: 5000 INJECTION INTRAVENOUS; SUBCUTANEOUS at 22:45

## 2018-01-01 RX ADMIN — POLYETHYLENE GLYCOL 3350 17 G: 17 POWDER, FOR SOLUTION ORAL at 08:15

## 2018-01-01 RX ADMIN — CLINDAMYCIN HYDROCHLORIDE 300 MG: 300 CAPSULE ORAL at 21:43

## 2018-01-01 RX ADMIN — INSULIN LISPRO 12 UNITS: 100 INJECTION, SOLUTION INTRAVENOUS; SUBCUTANEOUS at 17:34

## 2018-01-01 RX ADMIN — MORPHINE SULFATE 1 MG: 2 INJECTION, SOLUTION INTRAMUSCULAR; INTRAVENOUS at 10:03

## 2018-01-01 RX ADMIN — CEFEPIME HYDROCHLORIDE 2 G: 2 INJECTION, POWDER, FOR SOLUTION INTRAVENOUS at 20:51

## 2018-01-01 RX ADMIN — Medication 10 ML: at 09:55

## 2018-01-01 RX ADMIN — METRONIDAZOLE 500 MG: 500 INJECTION, SOLUTION INTRAVENOUS at 09:08

## 2018-01-01 RX ADMIN — SODIUM CHLORIDE 500 MG: 900 INJECTION, SOLUTION INTRAVENOUS at 08:25

## 2018-01-01 RX ADMIN — ACETAMINOPHEN 650 MG: 325 TABLET ORAL at 22:11

## 2018-01-01 RX ADMIN — LEVETIRACETAM 750 MG: 100 INJECTION, SOLUTION INTRAVENOUS at 11:45

## 2018-01-01 RX ADMIN — HEPARIN SODIUM 5000 UNITS: 5000 INJECTION INTRAVENOUS; SUBCUTANEOUS at 05:55

## 2018-01-01 RX ADMIN — INSULIN LISPRO 2 UNITS: 100 INJECTION, SOLUTION INTRAVENOUS; SUBCUTANEOUS at 18:37

## 2018-01-01 RX ADMIN — SODIUM CHLORIDE 500 MG: 900 INJECTION, SOLUTION INTRAVENOUS at 21:53

## 2018-01-01 RX ADMIN — HEPARIN SODIUM 5000 UNITS: 5000 INJECTION INTRAVENOUS; SUBCUTANEOUS at 14:05

## 2018-01-01 RX ADMIN — CLINDAMYCIN HYDROCHLORIDE 300 MG: 300 CAPSULE ORAL at 06:20

## 2018-01-01 RX ADMIN — INSULIN GLARGINE 30 UNITS: 100 INJECTION, SOLUTION SUBCUTANEOUS at 21:20

## 2018-01-01 RX ADMIN — ASPIRIN 81 MG CHEWABLE TABLET 81 MG: 81 TABLET CHEWABLE at 09:08

## 2018-01-01 RX ADMIN — Medication 10 ML: at 10:03

## 2018-01-01 RX ADMIN — CEFTRIAXONE SODIUM 2 G: 2 INJECTION, POWDER, FOR SOLUTION INTRAMUSCULAR; INTRAVENOUS at 18:50

## 2018-01-01 RX ADMIN — POLYETHYLENE GLYCOL 3350 17 G: 17 POWDER, FOR SOLUTION ORAL at 10:06

## 2018-01-01 RX ADMIN — ATORVASTATIN CALCIUM 40 MG: 40 TABLET, FILM COATED ORAL at 21:31

## 2018-01-01 ASSESSMENT — PAIN DESCRIPTION - PAIN TYPE
TYPE: ACUTE PAIN

## 2018-01-01 ASSESSMENT — PAIN DESCRIPTION - PROGRESSION
CLINICAL_PROGRESSION: GRADUALLY WORSENING

## 2018-01-01 ASSESSMENT — ENCOUNTER SYMPTOMS
SORE THROAT: 0
DIARRHEA: 1
PHOTOPHOBIA: 0
ABDOMINAL PAIN: 0
BLURRED VISION: 0
BLOOD IN STOOL: 0
SPUTUM PRODUCTION: 1
DOUBLE VISION: 0
SINUS PAIN: 0
VOMITING: 0
NAUSEA: 0
SHORTNESS OF BREATH: 0
CONSTIPATION: 0
COUGH: 0
WHEEZING: 0

## 2018-01-01 ASSESSMENT — PAIN DESCRIPTION - ONSET
ONSET: ON-GOING
ONSET: GRADUAL
ONSET: ON-GOING
ONSET: ON-GOING

## 2018-01-01 ASSESSMENT — PAIN SCALES - GENERAL
PAINLEVEL_OUTOF10: 0
PAINLEVEL_OUTOF10: 8
PAINLEVEL_OUTOF10: 7
PAINLEVEL_OUTOF10: 7
PAINLEVEL_OUTOF10: 4
PAINLEVEL_OUTOF10: 0
PAINLEVEL_OUTOF10: 6
PAINLEVEL_OUTOF10: 0
PAINLEVEL_OUTOF10: 0

## 2018-01-01 ASSESSMENT — PAIN DESCRIPTION - ORIENTATION
ORIENTATION: LEFT
ORIENTATION: LEFT
ORIENTATION: UPPER
ORIENTATION: LEFT

## 2018-01-01 ASSESSMENT — PAIN DESCRIPTION - LOCATION
LOCATION: HEAD
LOCATION: LEG
LOCATION: GENERALIZED
LOCATION: LEG
LOCATION: LEG

## 2018-01-01 ASSESSMENT — PAIN DESCRIPTION - DESCRIPTORS
DESCRIPTORS: HEADACHE
DESCRIPTORS: ACHING

## 2018-01-01 ASSESSMENT — PAIN DESCRIPTION - FREQUENCY
FREQUENCY: CONTINUOUS
FREQUENCY: CONTINUOUS
FREQUENCY: INTERMITTENT
FREQUENCY: INTERMITTENT

## 2018-01-09 ENCOUNTER — HOSPITAL ENCOUNTER (OUTPATIENT)
Dept: WOUND CARE | Age: 68
Discharge: HOME | End: 2018-01-09
Attending: PODIATRIST | Admitting: PODIATRIST

## 2018-01-09 VITALS
TEMPERATURE: 97.7 F | RESPIRATION RATE: 18 BRPM | SYSTOLIC BLOOD PRESSURE: 161 MMHG | DIASTOLIC BLOOD PRESSURE: 90 MMHG | HEART RATE: 76 BPM

## 2018-01-09 RX ORDER — LIDOCAINE HYDROCHLORIDE 40 MG/ML
2.5 SOLUTION TOPICAL ONCE
Status: COMPLETED | OUTPATIENT
Start: 2018-01-09 | End: 2018-01-09

## 2018-01-09 RX ADMIN — LIDOCAINE HYDROCHLORIDE 2.5 ML: 40 SOLUTION TOPICAL at 15:11

## 2018-01-09 ASSESSMENT — PAIN DESCRIPTION - ORIENTATION: ORIENTATION: RIGHT

## 2018-01-09 ASSESSMENT — PAIN SCALES - GENERAL: PAINLEVEL_OUTOF10: 6

## 2018-01-09 ASSESSMENT — PAIN DESCRIPTION - ONSET: ONSET: ON-GOING

## 2018-01-09 ASSESSMENT — PAIN DESCRIPTION - LOCATION: LOCATION: FOOT

## 2018-01-09 ASSESSMENT — PAIN DESCRIPTION - PROGRESSION: CLINICAL_PROGRESSION: NOT CHANGED

## 2018-01-09 ASSESSMENT — PAIN DESCRIPTION - DESCRIPTORS: DESCRIPTORS: ACHING

## 2018-01-09 ASSESSMENT — PAIN DESCRIPTION - PAIN TYPE: TYPE: ACUTE PAIN

## 2018-01-10 NOTE — PROGRESS NOTES
to encounter. REVIEW OF SYSTEMS    Pertinent items are noted in HPI. Review of Systems: A 12 point review of symptoms is unremarkable with the exception of the chief complaint. Patient specifically denies nausea, fever, vomiting, chills, shortness of breath, chest pain, abdominal pain, constipation or difficulty urinating. Objective:      BP (!) 161/90   Pulse 76   Temp 97.7 °F (36.5 °C) (Oral)   Resp 18     Wt Readings from Last 3 Encounters:   10/17/17 212 lb 1.3 oz (96.2 kg)   07/20/17 182 lb (82.6 kg)   07/12/17 184 lb 12.8 oz (83.8 kg)       PHYSICAL EXAM    Patient presents today without his walker. DP/PT pulses nonplapable bilaterally. TMA noted on the right. Great toe ad 5th digit amputated on the left. Digits are pink and warm to the touch. Hair growth is absent. Mild edema noted. No calf pain with palpation noted. Epicritic sensation is grossly absent bilaterally. Negative clonus and babinski reflex is down going. Incision on the left great toe amputation site is healed. No signs of infection. Wound noted on the distal lateral  Plantar aspect if the right TMA site. Wound has fibrotic and nonviable tissue that extends down through and includes the subcutaneous tissue/muscle/fascia. After debridement the wound has a granular base. There is no surrounding erythema, edema, warmth or malodor noted. The wound does not probe or track to bone.      Assessment:      Patient Active Problem List   Diagnosis Code    Chest pain R07.9    Leg edema Z47.7    Systolic and diastolic CHF, chronic ([facility]) I50.42    DM (diabetes mellitus) ([facility] [address] E11.9    Essential hypertension I10    History of CVA with residual deficit I69.30    Cellulitis of foot L03.119    Diabetic foot infection ([address] E11.69, L08.9    MADYSON (acute kidney injury) ([facility] [address] N17.9    H/O cocaine abuse Z87.898    New onset seizure ([facility] [address] R56.9    Chronic generalized pain R52, G89.29    PAD (peripheral artery disease) ([facility]) I73.9    Seizure, convulsion ([facility] R56.9    Diabetic infection of left foot ([facility] E11.69, L08.9    Non-ischemic cardiomyopathy ([facility] I42.8    Non-healing wound of lower extremity S81.809A    Osteomyelitis of left foot ([facility]) M86.9    Ischemic ulcer diabetic foot ([facility] E11.621, L97.509    Seizure disorder ([facility]) G40.909    Elevated troponin R74.8    CKD (chronic kidney disease) stage 3, GFR 30-59 ml/min N18.3    Toe osteomyelitis, left ([facility]) M86.9    Critical lower limb ischemia I99.8    S/P angioplasty with stent Z95.9        Procedure Note  Indications:  Based on my examination of this patient's wound(s)/ulcer(s) today, debridement is required to promote healing and evaluate the wound base. Performed by: Rene Walden DPM    Consent obtained:  Yes    Time out taken:  Yes    Pain Control: Anesthetic  Anesthetic: 4% Lidocaine Liquid Topical       Debridement:Excisional Debridement    Using curette, #15 blade scalpel and forceps the wound(s)/ulcer(s) was/were sharply debrided down through and including the removal of epidermis, dermis, subcutaneous tissue and muscle/fascia.         Devitalized Tissue Debrided:  fibrin and slough    Pre Debridement Measurements:  Are located in the [place]  Documentation Flow Sheet    Wound/Ulcer #: 3    Post Debridement Measurements:  Wound/Ulcer Descriptions are Pre Debridement except measurements:    Wound 05/23/17 Foot Right;Plantar;Lateral #3- diabetic (Active)   Wound Type Wound 1/9/2018  3:12 PM   Wound Diabetic Porter 2 11/7/2017  3:30 PM   Dressing/Treatment Other (Comment) 1/9/2018  3:12 PM   Wound Cleansed Rinsed/Irrigated with saline 1/9/2018  3:12 PM   Wound Length (cm) 4.5 cm 1/9/2018  3:12 PM   Wound Width (cm) 3 cm 1/9/2018  3:12 PM   Wound Depth (cm)  0.2 1/9/2018  3:12 PM   Calculated Wound Size (cm^2) (l*w) 12.3 cm^2 1/9/2018  3:12 PM   Change in Wound Size % (l*w) -166.23 1/9/2018  3:12 PM   Tunneling Position ___ O'Clock 0 1/9/2018  3:12 PM   Undermining Starts ___ O'Clock 1000 12/19/2017  3:27 PM   Undermining Ends___ O'Clock 1100 12/19/2017  3:27 PM   Undermining Maxium Distance (cm) 0 1/9/2018  3:12 PM   Wound Assessment Slough; Yellow;Red 1/9/2018  3:12 PM   Drainage Amount Moderate 1/9/2018  3:12 PM   Drainage Description Nnamdi Donohue 1/9/2018  3:12 PM   Odor None 1/9/2018  3:12 PM   Margins Defined edges 1/9/2018  3:12 PM   Exposed structure Tendon 11/21/2017  2:34 PM   Candida-wound Assessment White; Maceration;Dry 1/9/2018  3:12 PM   Non-staged Wound Description Full thickness 1/9/2018  3:12 PM   [place]%Wound Bed 0 1/9/2018  3:12 PM   Red%Wound Bed 80 1/9/2018  3:12 PM   Yellow%Wound Bed 20 1/9/2018  3:12 PM   Black%Wound Bed 0 1/9/2018  3:12 PM   Op First Treatment Date 05/23/17 8/22/2017  2:55 PM   Number of days: 231     Percent of Wound/Ulcer Debrided: 100%    Total Surface Area Debrided:  13.5 sq cm     Diabetic/Pressure/Non Pressure Ulcers only:  Ulcer: Diabetic ulcer, fat layer exposed      Estimated Blood Loss:  Minimal    Hemostasis Achieved:  by pressure    Procedural Pain:  0  / 10     Post Procedural Pain:  0 / 10     Response to treatment:  Well tolerated by patient. Plan:   Discussed with patient at length that the wound is not adequately offloaded. He MUST use his walker. Patient relates that he will be better about using his walker. Orders written for local wound care with maximiliano daily. Offload wound with cast boot and walker. Treatment Note please see attached Discharge Instructions    In my professional opinion this patient would benefit from HBO Therapy: No    Written patient dismissal instructions given to patient and signed by patient or POA. RTC 1 week. Discharge [facility] Physician Orders and Discharge Instructions  The [id number] [place] [address] [address] [facility]  Telephone: [phone]    NAME:  Avinash Loges  DATE OF BIRTH: 1950  MEDICAL RECORD NUMBER:  [id number]  DATE:  1/9/2018    Wash hands with soap and water prior to and after every dressing change. Wound Cleansing:   · Do not scrub or use excessive force. · With each dressing change, rinse wounds with 0.9% Saline. (May use wound wash or soft contact solution. Both can be purchased at a local drug store). · If unable to obtain saline, may use a gentle soap and water. · Keep wounds dry in the shower unless otherwise instructed by the physician. Topical Treatments:  Do not apply lotions, creams, or ointments to wound bed unless directed. [x] Apply moisturizing lotion to skin surrounding the wound prior to dressing change.  [] Apply antifungal ointment to skin surrounding the wound prior to dressing change.  [] Apply thin film of moisture barrier ointment to skin immediately around wound. [] Other:      Dressings:           Wound Location: Right Plantar Foot     [x] Apply Primary Dressing to wound:       [] Foam/Foam with Border(i.e Mepilex) [] Non-adherent (i.e.Mepitel)   [] Alginate with Silver (i.e. Silvercel)   [] Alginate   [] Collagen (i.e. Puracol) [x] Collagen with Silver(i.e. Linda)     [] Hydrocolloid  [] Hydrafera Blue moistened with saline   [] MediHoney Paste/Gel [] Hydrogel      [] Santyl with Moisten saline gauze    [] Bactroban/Mupirocin [] Polysporin  [] Other:      Pack wound loosely with  [] Iodoform   [] Plain Packing  [] Other      [x] Cover and Secure with:     [x] Gauze [] ABD [] Stretch bandage roll [] Kerlix   [] Coban [] Ace Wrap [] Cover Roll Tape    [] Other:    Avoid contact of tape with skin.     [x] Change dressing: [] Daily    [] Every Other Day [] Three times per week   [] Once a week [] Do Not Change Dressing   [x] Other: Two times per week       Edema Control:  Apply: [] Compression Stocking []Right Leg []Left Leg     [x] SpandaGrip [x]Right Leg  []Left Leg      []Low compression 5-10 mm/Hg      [x]Medium compression 10-20 mm/Hg     []High compression  20-30 mm/Hg    Apply every morning immediately when getting up. They should be applied to affected leg(s) from mid foot to knee making sure to cover the heel. Remove every night before going to bed. [x] Elevate leg(s) above the level of the heart for 30 minutes 4-5 times a day and/or when sitting. [x] Avoid prolonged standing in one place. Off-Loading:   [x] Off-loading when: [x] walking  [] in bed [] sitting    [] Total non-weight bearing:  [] Right Leg  [] Left Leg     [] Partial weight bearing:   [] Right Leg  [] Left Leg    [] No weight bearing restrictions     [x] Assistive Device: [x] Walker (PLEASE USE!) [] Crutches  [] Wheelchair [] Roll About   [] Surgical shoe/Darco    [] Podus Boot(s)   [] Prevalon Boot(s)  [] CROW Boot    [x] Cast/CAM Boot [] Other:      Dietary:  Important dietary reminders:  1. Increase Protein intake (i.e. Lean meats, fish, eggs, legumes, and yogurt)  2. No added salt  3. If diabetic, follow a diabetic diet and check glucose prior to meals or as instructed by your physician.         Return Appointment:  [] Wound and dressing supply provider:   [x] EC or Home Healthcare: [address] [place] [address] St: [phone] F: [phone]    [] Nurse visit:    [] Return Appointment: With Dr Mingo Alvarado  in  [address] [place] [address])    [] Ordered tests:       Consults: (see attached referral)    [] Weight Management [] Diabetes Education [] Vascular Surgery  [] Endocrinology  [] Nutrition Counseling  [] Lymphedema Therapy     Your nurse  is:  Hiwot Avery     Electronically signed by Allyson Flores RN on 1/9/2018 at 3:32 PM     [address] Information: Should you experience any significant changes in your wound(s) or have questions about your wound care, please contact the [address] [place] [address] at [phone] Monday and Wednesday 8:00 am - 2:00 pm and Tuesday, Thursday and Friday from 8:00 am - 4:30 pm.   If you need help with your wound outside these hours and cannot wait until we are again available, contact your PCP or go to the hospital emergency room. PLEASE NOTE: IF YOU ARE UNABLE TO OBTAIN WOUND SUPPLIES, CONTINUE TO USE THE SUPPLIES YOU HAVE AVAILABLE UNTIL YOU ARE ABLE TO REACH US. IT IS MOST IMPORTANT TO KEEP THE WOUND COVERED AT ALL TIMES. Physician orders by:     [x] Dr Judy Alcantara [] Dr Radha Rosenberg    [] Dr Trang Cronin     [] Dr Naina Upton   [] Dr Staci Dumont  [] Dr Trista Miller  [] Dr Gutierrez Favorite       Physician Signature:__________________________________        The information contained in the After Visit Summary has been reviewed with me, the patient and/or responsible adult, by my health care provider(s). I had the opportunity to ask questions regarding this information.   I have elected to receive;      [] Patient unable to sign Discharge Instructions given to ECF/Transportation/POA              Electronically signed by Judy Alcantara DPM on 1/10/2018 at 10:12 AM

## 2018-01-30 ENCOUNTER — HOSPITAL ENCOUNTER (OUTPATIENT)
Dept: WOUND CARE | Age: 68
Discharge: HOME | End: 2018-01-30
Attending: PODIATRIST | Admitting: PODIATRIST

## 2018-01-30 VITALS
TEMPERATURE: 98.9 F | RESPIRATION RATE: 18 BRPM | DIASTOLIC BLOOD PRESSURE: 72 MMHG | SYSTOLIC BLOOD PRESSURE: 141 MMHG | HEART RATE: 74 BPM

## 2018-01-30 RX ORDER — LIDOCAINE HYDROCHLORIDE 40 MG/ML
SOLUTION TOPICAL ONCE
Status: DISCONTINUED | OUTPATIENT
Start: 2018-01-30 | End: 2018-01-31 | Stop reason: HOSPADM

## 2018-01-30 ASSESSMENT — PAIN DESCRIPTION - LOCATION: LOCATION_2: SHOULDER

## 2018-01-30 ASSESSMENT — PAIN DESCRIPTION - INTENSITY: RATING_2: 7

## 2018-01-30 ASSESSMENT — PAIN DESCRIPTION - DESCRIPTORS: DESCRIPTORS_2: ACHING

## 2018-02-06 ENCOUNTER — HOSPITAL ENCOUNTER (OUTPATIENT)
Dept: WOUND CARE | Age: 68
Discharge: HOME | End: 2018-02-06
Attending: PODIATRIST | Admitting: PODIATRIST

## 2018-02-06 VITALS
TEMPERATURE: 98.1 F | SYSTOLIC BLOOD PRESSURE: 155 MMHG | RESPIRATION RATE: 18 BRPM | HEART RATE: 57 BPM | DIASTOLIC BLOOD PRESSURE: 80 MMHG

## 2018-02-06 RX ORDER — LIDOCAINE HYDROCHLORIDE 40 MG/ML
2.5 SOLUTION TOPICAL ONCE
Status: COMPLETED | OUTPATIENT
Start: 2018-02-06 | End: 2018-02-06

## 2018-02-06 RX ADMIN — LIDOCAINE HYDROCHLORIDE 2.5 ML: 40 SOLUTION TOPICAL at 14:49

## 2018-02-06 ASSESSMENT — PAIN DESCRIPTION - PAIN TYPE: TYPE: ACUTE PAIN

## 2018-02-06 ASSESSMENT — PAIN DESCRIPTION - DESCRIPTORS: DESCRIPTORS: ACHING;BURNING

## 2018-02-06 ASSESSMENT — PAIN SCALES - GENERAL: PAINLEVEL_OUTOF10: 7

## 2018-02-06 ASSESSMENT — PAIN DESCRIPTION - LOCATION: LOCATION: FOOT

## 2018-02-06 ASSESSMENT — PAIN DESCRIPTION - FREQUENCY: FREQUENCY: CONTINUOUS

## 2018-02-06 ASSESSMENT — PAIN DESCRIPTION - ORIENTATION: ORIENTATION: RIGHT

## 2018-02-06 ASSESSMENT — PAIN DESCRIPTION - PROGRESSION: CLINICAL_PROGRESSION: NOT CHANGED

## 2018-02-06 ASSESSMENT — PAIN DESCRIPTION - ONSET: ONSET: ON-GOING

## 2018-02-06 NOTE — PROGRESS NOTES
[facility]   Progress Note and Procedure Note      Ronaldo Pearson  MEDICAL RECORD NUMBER:  [id number]  AGE: 75 y.o. GENDER: male  : 1950  EPISODE DATE:  2018    Subjective:     Chief Complaint   Patient presents with    Wound Check     f/u plantar foot wound         HISTORY of PRESENT ILLNESS HPI     Ronaldo Pearson is a 75 y.o. male who presents today for wound/ulcer evaluation. History of Wound Context: right foot wound at the distal aspect of a previous TMA. Patient has recently has his left great toe amputated. He has been having local wound care at home per his home health care nurse. He has been recently revascularized by Dr. Greg James. Patient relates that he has been being more compliant with wearing his CAM boot and has been using his walker, but he presents today using a cane. Wound/Ulcer Pain Timing/Severity: none  Quality of pain: N/A  Severity:  0 / 10   Modifying Factors: None  Associated Signs/Symptoms: none    Ulcer Identification:  Ulcer Type: arterial, diabetic and pressure  Contributing Factors: diabetes and arterial insufficiency    Wound: N/A        PAST MEDICAL HISTORY        Diagnosis Date    CAD (coronary artery disease) 2012    minor, per cardiac cath    Diabetes mellitus ([address]     Diabetic infection of left foot ([address]     w/osteomyelitis    H/O cocaine abuse 2015    History of CVA with residual deficit 2012    Hypertension     MRSA (methicillin resistant staph aureus) culture positive 2017    toe    Non-healing wound of lower extremity 2016    L great toe    Non-ischemic cardiomyopathy ([address]     per cardiac cath    PAD (peripheral artery disease) ([address] 2016    L PIPER - 0.6, unable to obtain readings on R    Peripheral neuropathy (HCC)     Seizure, convulsion ([address] 2016     [facility] - due to scar from old L frontal infarction    Unspecified cerebral artery occlusion with cerebral infarction 2012 TIMES. Physician orders by:     [x] Dr Gilberto Melendrez [] Dr Onesimo Hawthorne    [] Dr Zach Escalante     [] Dr Mark Garcia   [] Dr Lupe Nelson  [] Dr James Nails  [] Dr Enoch Hill       Physician Signature:__________________________________        The information contained in the After Visit Summary has been reviewed with me, the patient and/or responsible adult, by my health care provider(s). I had the opportunity to ask questions regarding this information.   I have elected to receive;      [] Patient unable to sign Discharge Instructions given to ECF/Transportation/POA              Electronically signed by Gilberto Melendrez DPM on 2/6/2018 at 3:23 PM

## 2018-02-13 ENCOUNTER — TELEPHONE (OUTPATIENT)
Dept: WOUND CARE | Age: 68
End: 2018-02-13

## 2018-02-15 ENCOUNTER — HOSPITAL ENCOUNTER (OUTPATIENT)
Dept: WOUND CARE | Age: 68
Discharge: HOME | End: 2018-02-15
Attending: PODIATRIST | Admitting: PODIATRIST

## 2018-02-15 VITALS
HEART RATE: 83 BPM | DIASTOLIC BLOOD PRESSURE: 79 MMHG | RESPIRATION RATE: 18 BRPM | TEMPERATURE: 98.1 F | SYSTOLIC BLOOD PRESSURE: 130 MMHG

## 2018-02-15 RX ORDER — LIDOCAINE HYDROCHLORIDE 40 MG/ML
SOLUTION TOPICAL ONCE
Status: COMPLETED | OUTPATIENT
Start: 2018-02-15 | End: 2018-02-15

## 2018-02-15 RX ADMIN — LIDOCAINE HYDROCHLORIDE 2.5 ML: 40 SOLUTION TOPICAL at 14:57

## 2018-02-15 ASSESSMENT — PAIN DESCRIPTION - ORIENTATION: ORIENTATION: RIGHT

## 2018-02-15 ASSESSMENT — PAIN DESCRIPTION - LOCATION: LOCATION: FOOT

## 2018-02-15 ASSESSMENT — PAIN DESCRIPTION - PAIN TYPE: TYPE: ACUTE PAIN

## 2018-02-15 ASSESSMENT — PAIN DESCRIPTION - DESCRIPTORS: DESCRIPTORS: ACHING

## 2018-02-15 ASSESSMENT — PAIN DESCRIPTION - ONSET: ONSET: ON-GOING

## 2018-02-15 ASSESSMENT — PAIN DESCRIPTION - FREQUENCY: FREQUENCY: CONTINUOUS

## 2018-02-15 ASSESSMENT — PAIN SCALES - GENERAL: PAINLEVEL_OUTOF10: 8

## 2018-02-15 NOTE — PROGRESS NOTES
[facility]   Progress Note and Procedure Note      Paris Bernabe  MEDICAL RECORD NUMBER:  [id number]  AGE: 75 y.o. GENDER: male  : 1950  EPISODE DATE:  2/15/2018    Subjective:     Chief Complaint   Patient presents with    Wound Check     right foot         HISTORY of PRESENT ILLNESS HPI     Paris Bernabe is a 75 y.o. male who presents today for wound/ulcer evaluation. History of Wound Context: right foot wound at the distal aspect of a previous TMA. Patient has recently has his left great toe amputated. He has been having local wound care at home per his home health care nurse. He has been recently revascularized by Dr. Odetta Severin. Patient relates that he has been being more compliant with wearing his CAM boot and has been using his walker, but he presents today using a cane. Wound/Ulcer Pain Timing/Severity: none  Quality of pain: N/A  Severity:  0 / 10   Modifying Factors: None  Associated Signs/Symptoms: none    Ulcer Identification:  Ulcer Type: arterial, diabetic and pressure  Contributing Factors: diabetes and arterial insufficiency    Wound: N/A        PAST MEDICAL HISTORY        Diagnosis Date    CAD (coronary artery disease) 2012    minor, per cardiac cath    Diabetes mellitus ([address]     Diabetic infection of left foot ([address]     w/osteomyelitis    H/O cocaine abuse 2015    History of CVA with residual deficit 2012    Hypertension     MRSA (methicillin resistant staph aureus) culture positive 2017    toe    Non-healing wound of lower extremity 2016    L great toe    Non-ischemic cardiomyopathy ([address]     per cardiac cath    PAD (peripheral artery disease) ([address] 2016    L PIPER - 0.6, unable to obtain readings on R    Peripheral neuropathy (HCC)     Seizure, convulsion ([address] 2016    Dr. Marlo Leung - due to scar from old L frontal infarction    Unspecified cerebral artery occlusion with cerebral infarction 2012       PAST Undermining Starts ___ O'Clock 11:00 2/15/2018  2:52 PM   Undermining Ends___ O'Clock 11:00 2/15/2018  2:52 PM   Undermining Maxium Distance (cm) 0.1 2/15/2018  2:52 PM   Wound Assessment Yellow;Red;Pink 2/15/2018  2:52 PM   Drainage Amount Moderate 2/15/2018  2:52 PM   Drainage Description Brian Elder 2/15/2018  2:52 PM   Odor Mild 2/15/2018  2:52 PM   Margins Defined edges 2/15/2018  2:52 PM   Exposed structure Tendon 11/21/2017  2:34 PM   Candida-wound Assessment White; Maceration 2/15/2018  2:52 PM   Non-staged Wound Description Full thickness 2/15/2018  2:52 PM   [place]%Wound Bed 60 2/15/2018  2:52 PM   Red%Wound Bed 5 2/15/2018  2:52 PM   Yellow%Wound Bed 35 2/15/2018  2:52 PM   Black%Wound Bed 0 1/9/2018  3:12 PM   Op First Treatment Date 05/23/17 8/22/2017  2:55 PM   Number of days: 268   Percent of Wound/Ulcer Debrided: 100%    Total Surface Area Debrided: 6.82 sq cm     Diabetic/Pressure/Non Pressure Ulcers only:  Ulcer: Diabetic ulcer, fat layer exposed      Estimated Blood Loss:  Minimal    Hemostasis Achieved:  by pressure    Procedural Pain:  0  / 10     Post Procedural Pain:  0 / 10     Response to treatment:  Well tolerated by patient. Plan:   Discussed with patient at length that the wound is not adequately offloaded. He MUST use his walker. Patient relates that he will be better about using his walker. Continue CAM Walker. Orders written for local wound care with maximiliano daily. Offload wound with cast boot and walker. Treatment Note please see attached Discharge Instructions    In my professional opinion this patient would benefit from HBO Therapy: No    Written patient dismissal instructions given to patient and signed by patient or POA. RTC 1 week. Discharge [facility] Physician Orders and Discharge Instructions  [address]  [place] [address] [place] [address]  Telephone: [phone]    NAME:  Iman Gutierrez  YOB: 1950  MEDICAL RECORD NUMBER:  [id number]  DATE:  2/15/2018    Wash hands with soap and water prior to and after every dressing change. Wound Cleansing:   · Do not scrub or use excessive force. · With each dressing change, rinse wounds with 0.9% Saline. (May use wound wash or soft contact solution. Both can be purchased at a local drug store). · If unable to obtain saline, may use a gentle soap and water. · Keep wounds dry in the shower unless otherwise instructed by the physician. Topical Treatments:  Do not apply lotions, creams, or ointments to wound bed unless directed. [x] Apply moisturizing lotion to skin surrounding the wound prior to dressing change.  [] Apply antifungal ointment to skin surrounding the wound prior to dressing change.  [] Apply thin film of moisture barrier ointment to skin immediately around wound. [] Other:      Dressings:           Wound Location: Right Foot     [x] Apply Primary Dressing to wound:       [] Foam/Foam with Border(i.e Mepilex) [] Non-adherent (i.e.Mepitel)   [] Alginate with Silver (i.e. Silvercel)   [] Alginate   [] Collagen (i.e. Puracol) [] Collagen with Silver(i.e. Linda)     [] Hydrocolloid  [x] Hydrafera Blue moistened with saline   [] MediHoney Paste/Gel [] Hydrogel      [] Santyl covered with gauze moisten with saline    [] Bactroban/Mupirocin [] Polysporin  [] Other:      Pack wound loosely with  [] Iodoform   [] Plain Packing  [] Saline \"wet to dry\"      [x] Cover and Secure with:     [x] Dry Gauze [] ABD [] Stretch bandage roll [x] Kerlix   [x] Coban [] Ace Wrap [] Cover Roll Tape    [] Other:    Avoid contact of tape with skin.     [x] Change dressing: [] Daily    [] Every Other Day [] Three times per week   [] Once a week [] Do Not Change Dressing   [x] Other: Two times per week       Edema Control:  Apply: [] Compression Stocking []Right Leg []Left Leg     [x] SpandaGrip [x]Right Leg Wednesday 8:00 am - 2:00 pm and Tuesday, Thursday and Friday from 8:00 am - 4:30 pm.   If you need help with your wound outside these hours and cannot wait until we are again available, contact your PCP or go to the hospital emergency room. PLEASE NOTE: IF YOU ARE UNABLE TO OBTAIN WOUND SUPPLIES, CONTINUE TO USE THE SUPPLIES YOU HAVE AVAILABLE UNTIL YOU ARE ABLE TO REACH US. IT IS MOST IMPORTANT TO KEEP THE WOUND COVERED AT ALL TIMES. Physician orders by:     [] Dr Kimi Ruffin [] Dr Rafael Corona    [] Dr Sarabjit Chan     [] Dr Wilkie Bumpers   [] Dr Camila Ott  [] Dr Zuri Moreira  [] Dr Augustin Aguero       Physician Signature:__________________________________        The information contained in the After Visit Summary has been reviewed with me, the patient and/or responsible adult, by my health care provider(s). I had the opportunity to ask questions regarding this information.   I have elected to receive;      [] Patient unable to sign Discharge Instructions given to ECF/Transportation/POA              Electronically signed by Henok Veliz DPM on 2/15/2018 at 5:05 PM

## 2018-03-01 ENCOUNTER — TELEPHONE (OUTPATIENT)
Dept: WOUND CARE | Age: 68
End: 2018-03-01

## 2018-03-01 NOTE — TELEPHONE ENCOUNTER
Pt called and cancelled appt for today (3/1/18 Thursday), due to no transport, which was rescheduled from Tuesday (2/27/18) missed appointment due to transport issues as well. Rescheduled for Tuesday 3/6/18.

## 2018-03-06 ENCOUNTER — HOSPITAL ENCOUNTER (OUTPATIENT)
Dept: WOUND CARE | Age: 68
Discharge: HOME | End: 2018-03-06
Attending: PODIATRIST | Admitting: PODIATRIST

## 2018-03-06 VITALS
HEART RATE: 76 BPM | DIASTOLIC BLOOD PRESSURE: 92 MMHG | TEMPERATURE: 97.9 F | SYSTOLIC BLOOD PRESSURE: 186 MMHG | RESPIRATION RATE: 18 BRPM

## 2018-03-06 RX ORDER — LIDOCAINE HYDROCHLORIDE 40 MG/ML
2.5 SOLUTION TOPICAL ONCE
Status: COMPLETED | OUTPATIENT
Start: 2018-03-06 | End: 2018-03-06

## 2018-03-06 RX ADMIN — LIDOCAINE HYDROCHLORIDE 2.5 ML: 40 SOLUTION TOPICAL at 14:56

## 2018-03-14 ENCOUNTER — TELEPHONE (OUTPATIENT)
Dept: WOUND CARE | Age: 68
End: 2018-03-14

## 2018-03-20 ENCOUNTER — HOSPITAL ENCOUNTER (OUTPATIENT)
Dept: WOUND CARE | Age: 68
Discharge: HOME | End: 2018-03-20
Attending: PODIATRIST | Admitting: PODIATRIST

## 2018-03-20 VITALS
DIASTOLIC BLOOD PRESSURE: 83 MMHG | TEMPERATURE: 98.2 F | SYSTOLIC BLOOD PRESSURE: 170 MMHG | HEART RATE: 67 BPM | RESPIRATION RATE: 18 BRPM

## 2018-03-20 RX ORDER — LIDOCAINE HYDROCHLORIDE 40 MG/ML
SOLUTION TOPICAL ONCE
Status: COMPLETED | OUTPATIENT
Start: 2018-03-20 | End: 2018-03-20

## 2018-03-20 RX ADMIN — LIDOCAINE HYDROCHLORIDE 2.5 ML: 40 SOLUTION TOPICAL at 16:39

## 2018-03-20 ASSESSMENT — PAIN SCALES - GENERAL: PAINLEVEL_OUTOF10: 7

## 2018-03-20 ASSESSMENT — PAIN DESCRIPTION - PAIN TYPE: TYPE: ACUTE PAIN

## 2018-03-20 ASSESSMENT — PAIN DESCRIPTION - FREQUENCY: FREQUENCY: INTERMITTENT

## 2018-03-20 ASSESSMENT — PAIN DESCRIPTION - LOCATION: LOCATION: FOOT

## 2018-03-20 ASSESSMENT — PAIN DESCRIPTION - ORIENTATION: ORIENTATION: RIGHT

## 2018-03-20 ASSESSMENT — PAIN DESCRIPTION - DESCRIPTORS: DESCRIPTORS: ACHING

## 2018-03-20 NOTE — PROGRESS NOTES
[facility] [address]   Progress Note and Procedure Note      Lidya Szymanski  MEDICAL RECORD NUMBER:  [id number]  AGE: 75 y.o. GENDER: male  : 1950  EPISODE DATE:  3/20/2018    Subjective:     Chief Complaint   Patient presents with    Wound Check     right foot wound         HISTORY of PRESENT ILLNESS HPI     Lidya Szymanski is a 75 y.o. male who presents today for wound/ulcer evaluation. History of Wound Context: right foot wound at the distal aspect of a previous TMA. Patient has recently has his left great toe amputated. He has been having local wound care at home per his home health care nurse. He has been recently revascularized by Dr. Nicole Hernandez. Patient relates that he has been being more compliant with wearing his CAM boot and has been using his walker. He presents today with his walker. Patient relates that he has had trouble getting rides to his appointments. Wound/Ulcer Pain Timing/Severity: none  Quality of pain: N/A  Severity:  0 / 10   Modifying Factors: None  Associated Signs/Symptoms: none    Ulcer Identification:  Ulcer Type: arterial, diabetic and pressure  Contributing Factors: diabetes and arterial insufficiency    Wound: N/A        PAST MEDICAL HISTORY        Diagnosis Date    CAD (coronary artery disease)     minor, per cardiac cath    Diabetes mellitus ([address]     Diabetic infection of left foot ([address]     w/osteomyelitis    H/O cocaine abuse 2015    History of CVA with residual deficit 2012    Hypertension     MRSA (methicillin resistant staph aureus) culture positive 2017    toe    Non-healing wound of lower extremity 2016    L great toe    Non-ischemic cardiomyopathy ([address] 2012    per cardiac cath    PAD (peripheral artery disease) ([address] 2016    L PIPER - 0.6, unable to obtain readings on R    Peripheral neuropathy (HCC)     Seizure, convulsion ([address] 2016     [facility]. Michaelmerler - due to scar from old L frontal infarction    Tunneling (cm) 0 cm 2/15/2018  2:52 PM   Tunneling Position ___ O'Clock 0 1/9/2018  3:12 PM   Undermining Starts ___ O'Clock 11:00 3/20/2018  4:23 PM   Undermining Ends___ O'Clock 11:00 3/20/2018  4:23 PM   Undermining Maxium Distance (cm) 0.1 3/20/2018  4:23 PM   Wound Assessment Pink;Red;Yellow 3/20/2018  4:23 PM   Drainage Amount Small 3/20/2018  4:23 PM   Drainage Description Brown;Serosanguinous 3/20/2018  4:23 PM   Odor None 3/20/2018  4:23 PM   Margins Defined edges 3/20/2018  4:23 PM   Exposed structure Tendon 11/21/2017  2:34 PM   Candida-wound Assessment Calloused; Maceration; Tan 3/20/2018  4:23 PM   Non-staged Wound Description Full thickness 3/20/2018  4:23 PM   [place]%Wound Bed 40 3/20/2018  4:23 PM   Red%Wound Bed 20 3/20/2018  4:23 PM   Yellow%Wound Bed 40 3/20/2018  4:23 PM   Black%Wound Bed 0 1/9/2018  3:12 PM   Op First Treatment Date 05/23/17 8/22/2017  2:55 PM   Number of days: 301     Percent of Wound/Ulcer Debrided: 100%    Total Surface Area Debrided: 7 sq cm     Diabetic/Pressure/Non Pressure Ulcers only:  Ulcer: Diabetic ulcer, fat layer exposed      Estimated Blood Loss:  Minimal    Hemostasis Achieved:  by pressure    Procedural Pain:  0  / 10     Post Procedural Pain:  0 / 10     Response to treatment:  Well tolerated by patient. Plan:   Discussed with patient at length that the wound is not adequately offloaded. He MUST use his walker. Patient relates that he will be better about using his walker. Continue CAM Walker. Orders written for local wound care with hydrafera blue. Patient relates that his home health care nurse is changing the dressing on [address] and fridays. This seems to be OK as there is not a lot of drainage. Offload wound with cast boot and walker.     Treatment Note please see attached Discharge Instructions    In my professional opinion this patient would benefit from HBO Therapy: No    Written patient dismissal instructions given to patient and signed by

## 2018-04-17 PROBLEM — M86.9 OSTEOMYELITIS (HCC): Status: ACTIVE | Noted: 2018-04-17

## 2018-04-18 PROBLEM — M86.271 SUBACUTE OSTEOMYELITIS OF RIGHT FOOT (HCC): Status: ACTIVE | Noted: 2018-04-17

## 2018-04-27 ENCOUNTER — TELEPHONE (OUTPATIENT)
Dept: INFECTIOUS DISEASES | Age: 68
End: 2018-04-27

## 2018-04-30 LAB
ALBUMIN SERPL-MCNC: 2.8 G/DL
ALP BLD-CCNC: 45 U/L
ALT SERPL-CCNC: 18 U/L
ANION GAP SERPL CALCULATED.3IONS-SCNC: 0.9 MMOL/L
AST SERPL-CCNC: 18 U/L
BASOPHILS ABSOLUTE: ABNORMAL /ΜL
BASOPHILS RELATIVE PERCENT: 0.3 %
BILIRUB SERPL-MCNC: 0.4 MG/DL (ref 0.1–1.4)
BUN BLDV-MCNC: 28 MG/DL
C-REACTIVE PROTEIN: 83.9
CALCIUM SERPL-MCNC: 7.8 MG/DL
CHLORIDE BLD-SCNC: 100 MMOL/L
CO2: 31 MMOL/L
CREAT SERPL-MCNC: 1.3 MG/DL
EOSINOPHILS ABSOLUTE: 0.3 /ΜL
EOSINOPHILS RELATIVE PERCENT: 2.7 %
GFR CALCULATED: 66
GLUCOSE BLD-MCNC: 81 MG/DL
HCT VFR BLD CALC: 29.3 % (ref 41–53)
HEMOGLOBIN: 9.7 G/DL (ref 13.5–17.5)
LYMPHOCYTES ABSOLUTE: 2.2 /ΜL
LYMPHOCYTES RELATIVE PERCENT: 22.4 %
MCH RBC QN AUTO: 27.6 PG
MCHC RBC AUTO-ENTMCNC: 33.2 G/DL
MCV RBC AUTO: 83.3 FL
MONOCYTES ABSOLUTE: 1 /ΜL
MONOCYTES RELATIVE PERCENT: 10.2 %
NEUTROPHILS ABSOLUTE: 6.2 /ΜL
NEUTROPHILS RELATIVE PERCENT: 64.4 %
PDW BLD-RTO: 15.2 %
PLATELET # BLD: 177 K/ΜL
PMV BLD AUTO: 8.6 FL
POTASSIUM SERPL-SCNC: 4.5 MMOL/L
RBC # BLD: 3.52 10^6/ΜL
SEDIMENTATION RATE, ERYTHROCYTE: 76
SODIUM BLD-SCNC: 136 MMOL/L
TOTAL PROTEIN: 5.9
WBC # BLD: 9.6 10^3/ML

## 2018-05-01 ENCOUNTER — HOSPITAL ENCOUNTER (OUTPATIENT)
Dept: WOUND CARE | Age: 68
Discharge: HOME | End: 2018-05-01
Attending: PODIATRIST | Admitting: PODIATRIST

## 2018-05-01 VITALS
SYSTOLIC BLOOD PRESSURE: 114 MMHG | RESPIRATION RATE: 18 BRPM | TEMPERATURE: 98.3 F | DIASTOLIC BLOOD PRESSURE: 67 MMHG | HEART RATE: 55 BPM

## 2018-05-01 DIAGNOSIS — M86.171 OTHER ACUTE OSTEOMYELITIS OF RIGHT FOOT (HCC): Primary | ICD-10-CM

## 2018-05-01 LAB
C-REACTIVE PROTEIN: 83.9
SEDIMENTATION RATE, ERYTHROCYTE: 76

## 2018-05-01 RX ORDER — POLYETHYLENE GLYCOL 3350 17 G/17G
17 POWDER, FOR SOLUTION ORAL DAILY
COMMUNITY

## 2018-05-01 RX ORDER — LIDOCAINE HYDROCHLORIDE 40 MG/ML
2.5 SOLUTION TOPICAL ONCE
Status: DISCONTINUED | OUTPATIENT
Start: 2018-05-01 | End: 2018-05-02 | Stop reason: HOSPADM

## 2018-05-01 RX ORDER — DAPTOMYCIN 50 MG/ML
450 INJECTION, POWDER, LYOPHILIZED, FOR SOLUTION INTRAVENOUS DAILY
Status: ON HOLD | COMMUNITY
End: 2018-01-01

## 2018-05-01 ASSESSMENT — PAIN DESCRIPTION - ONSET: ONSET: ON-GOING

## 2018-05-01 ASSESSMENT — PAIN DESCRIPTION - LOCATION: LOCATION: RECTUM

## 2018-05-01 ASSESSMENT — PAIN DESCRIPTION - FREQUENCY: FREQUENCY: INTERMITTENT

## 2018-05-01 ASSESSMENT — PAIN SCALES - GENERAL: PAINLEVEL_OUTOF10: 8

## 2018-05-01 ASSESSMENT — PAIN DESCRIPTION - PAIN TYPE: TYPE: ACUTE PAIN

## 2018-05-01 ASSESSMENT — PAIN DESCRIPTION - DESCRIPTORS: DESCRIPTORS: ACHING

## 2018-05-01 ASSESSMENT — PAIN DESCRIPTION - PROGRESSION: CLINICAL_PROGRESSION: NOT CHANGED

## 2018-05-01 ASSESSMENT — PAIN DESCRIPTION - ORIENTATION: ORIENTATION: MID

## 2018-05-07 LAB
ALBUMIN SERPL-MCNC: 3.3 G/DL
ALP BLD-CCNC: 53 U/L
ALT SERPL-CCNC: 15 U/L
ANION GAP SERPL CALCULATED.3IONS-SCNC: NORMAL MMOL/L
AST SERPL-CCNC: 18 U/L
BASOPHILS ABSOLUTE: ABNORMAL /ΜL
BASOPHILS RELATIVE PERCENT: 0.5 %
BILIRUB SERPL-MCNC: 0.3 MG/DL (ref 0.1–1.4)
BUN BLDV-MCNC: 18 MG/DL
C-REACTIVE PROTEIN: 19.6
CALCIUM SERPL-MCNC: 8.2 MG/DL
CHLORIDE BLD-SCNC: 99 MMOL/L
CO2: 24 MMOL/L
CREAT SERPL-MCNC: 1.1 MG/DL
EOSINOPHILS ABSOLUTE: 0.4 /ΜL
EOSINOPHILS RELATIVE PERCENT: 5.5 %
GFR CALCULATED: 81
GLUCOSE BLD-MCNC: 137 MG/DL
HCT VFR BLD CALC: 31.9 % (ref 41–53)
HEMOGLOBIN: 10.5 G/DL (ref 13.5–17.5)
LYMPHOCYTES ABSOLUTE: 2.2 /ΜL
LYMPHOCYTES RELATIVE PERCENT: 29.7 %
MCH RBC QN AUTO: 27.9 PG
MCHC RBC AUTO-ENTMCNC: 33 G/DL
MCV RBC AUTO: 84.5 FL
MONOCYTES ABSOLUTE: 0.7 /ΜL
MONOCYTES RELATIVE PERCENT: 9.7 %
NEUTROPHILS ABSOLUTE: 4.1 /ΜL
NEUTROPHILS RELATIVE PERCENT: 54.6 %
PLATELET # BLD: 299 K/ΜL
PMV BLD AUTO: ABNORMAL FL
POTASSIUM SERPL-SCNC: 5.4 MMOL/L
RBC # BLD: 3.77 10^6/ΜL
SEDIMENTATION RATE, ERYTHROCYTE: 62
SODIUM BLD-SCNC: 133 MMOL/L
TOTAL PROTEIN: 7
WBC # BLD: 7.5 10^3/ML

## 2018-05-08 ENCOUNTER — HOSPITAL ENCOUNTER (OUTPATIENT)
Dept: WOUND CARE | Age: 68
Discharge: HOME | End: 2018-05-08
Attending: PODIATRIST | Admitting: PODIATRIST

## 2018-05-08 VITALS — SYSTOLIC BLOOD PRESSURE: 112 MMHG | RESPIRATION RATE: 18 BRPM | TEMPERATURE: 97.4 F | DIASTOLIC BLOOD PRESSURE: 66 MMHG

## 2018-05-08 DIAGNOSIS — L97.412 ULCER OF RIGHT HEEL, WITH FAT LAYER EXPOSED (HCC): ICD-10-CM

## 2018-05-08 RX ORDER — OXYCODONE HYDROCHLORIDE AND ACETAMINOPHEN 5; 325 MG/1; MG/1
1 TABLET ORAL EVERY 6 HOURS PRN
COMMUNITY

## 2018-05-08 ASSESSMENT — PAIN DESCRIPTION - DESCRIPTORS: DESCRIPTORS: ACHING

## 2018-05-08 ASSESSMENT — PAIN DESCRIPTION - FREQUENCY: FREQUENCY: INTERMITTENT

## 2018-05-08 ASSESSMENT — PAIN SCALES - GENERAL: PAINLEVEL_OUTOF10: 4

## 2018-05-08 ASSESSMENT — PAIN DESCRIPTION - PAIN TYPE: TYPE: ACUTE PAIN

## 2018-05-08 ASSESSMENT — PAIN DESCRIPTION - ORIENTATION: ORIENTATION: RIGHT

## 2018-05-08 ASSESSMENT — PAIN DESCRIPTION - LOCATION: LOCATION: FOOT

## 2018-05-10 PROBLEM — L97.412 ULCER OF RIGHT HEEL, WITH FAT LAYER EXPOSED (HCC): Status: ACTIVE | Noted: 2018-05-10

## 2018-05-14 LAB
ALBUMIN SERPL-MCNC: 3.7 G/DL
ALP BLD-CCNC: 53 U/L
ALT SERPL-CCNC: 11 U/L
ANION GAP SERPL CALCULATED.3IONS-SCNC: NORMAL MMOL/L
AST SERPL-CCNC: 14 U/L
BASOPHILS ABSOLUTE: ABNORMAL /ΜL
BASOPHILS RELATIVE PERCENT: 0.3 %
BILIRUB SERPL-MCNC: 0.4 MG/DL (ref 0.1–1.4)
BUN BLDV-MCNC: 21 MG/DL
CALCIUM SERPL-MCNC: 8 MG/DL
CHLORIDE BLD-SCNC: 104 MMOL/L
CO2: 23 MMOL/L
CREAT SERPL-MCNC: 1.2 MG/DL
EOSINOPHILS ABSOLUTE: 0.3 /ΜL
EOSINOPHILS RELATIVE PERCENT: 5.2 %
GFR CALCULATED: 73
GLUCOSE BLD-MCNC: 95 MG/DL
HCT VFR BLD CALC: 33.6 % (ref 41–53)
HEMOGLOBIN: 11.1 G/DL (ref 13.5–17.5)
LYMPHOCYTES ABSOLUTE: 2.4 /ΜL
LYMPHOCYTES RELATIVE PERCENT: 38.2 %
MCH RBC QN AUTO: 27.6 PG
MCHC RBC AUTO-ENTMCNC: 33 G/DL
MCV RBC AUTO: 83.6 FL
MONOCYTES ABSOLUTE: 0.7 /ΜL
MONOCYTES RELATIVE PERCENT: 10.8 %
NEUTROPHILS ABSOLUTE: 2.9 /ΜL
NEUTROPHILS RELATIVE PERCENT: 45.5 %
PLATELET # BLD: 248 K/ΜL
PMV BLD AUTO: 8.1 FL
POTASSIUM SERPL-SCNC: 5.5 MMOL/L
RBC # BLD: 4.01 10^6/ΜL
SODIUM BLD-SCNC: 138 MMOL/L
TOTAL PROTEIN: 7.1
WBC # BLD: 6.4 10^3/ML

## 2018-05-21 LAB
ALBUMIN SERPL-MCNC: 3.2 G/DL
ALP BLD-CCNC: 47 U/L
ALT SERPL-CCNC: 11 U/L
ANION GAP SERPL CALCULATED.3IONS-SCNC: NORMAL MMOL/L
AST SERPL-CCNC: 13 U/L
BASOPHILS ABSOLUTE: ABNORMAL /ΜL
BASOPHILS RELATIVE PERCENT: 0.4 %
BILIRUB SERPL-MCNC: 0.3 MG/DL (ref 0.1–1.4)
BUN BLDV-MCNC: 21 MG/DL
CALCIUM SERPL-MCNC: 8 MG/DL
CHLORIDE BLD-SCNC: 107 MMOL/L
CO2: 30 MMOL/L
CREAT SERPL-MCNC: 1.4 MG/DL
EOSINOPHILS ABSOLUTE: 0.4 /ΜL
EOSINOPHILS RELATIVE PERCENT: 6.7 %
GFR CALCULATED: 61
GLUCOSE BLD-MCNC: 87 MG/DL
HCT VFR BLD CALC: 30.1 % (ref 41–53)
HEMOGLOBIN: 10.1 G/DL (ref 13.5–17.5)
LYMPHOCYTES ABSOLUTE: 2.5 /ΜL
LYMPHOCYTES RELATIVE PERCENT: 47.4 %
MCH RBC QN AUTO: 27.7 PG
MCHC RBC AUTO-ENTMCNC: 33.5 G/DL
MCV RBC AUTO: 82.6 FL
MONOCYTES ABSOLUTE: 0.6 /ΜL
MONOCYTES RELATIVE PERCENT: 11 %
NEUTROPHILS ABSOLUTE: 1.8 /ΜL
NEUTROPHILS RELATIVE PERCENT: 34.5 %
PLATELET # BLD: 144 K/ΜL
PMV BLD AUTO: 8.5 FL
POTASSIUM SERPL-SCNC: 5.1 MMOL/L
RBC # BLD: 3.64 10^6/ΜL
SODIUM BLD-SCNC: 139 MMOL/L
TOTAL PROTEIN: 6.3
WBC # BLD: 5.3 10^3/ML

## 2018-05-22 ENCOUNTER — HOSPITAL ENCOUNTER (OUTPATIENT)
Dept: WOUND CARE | Age: 68
Discharge: HOME | End: 2018-05-22
Attending: PODIATRIST | Admitting: PODIATRIST

## 2018-05-22 VITALS
DIASTOLIC BLOOD PRESSURE: 67 MMHG | HEART RATE: 62 BPM | RESPIRATION RATE: 18 BRPM | SYSTOLIC BLOOD PRESSURE: 134 MMHG | TEMPERATURE: 97.9 F

## 2018-05-22 DIAGNOSIS — L97.412 ULCER OF RIGHT HEEL, WITH FAT LAYER EXPOSED (HCC): ICD-10-CM

## 2018-05-22 RX ORDER — LIDOCAINE HYDROCHLORIDE 40 MG/ML
2.5 SOLUTION TOPICAL ONCE
Status: COMPLETED | OUTPATIENT
Start: 2018-05-22 | End: 2018-05-22

## 2018-05-22 RX ADMIN — LIDOCAINE HYDROCHLORIDE 2.5 ML: 40 SOLUTION TOPICAL at 15:54

## 2018-05-22 ASSESSMENT — PAIN DESCRIPTION - FREQUENCY: FREQUENCY: INTERMITTENT

## 2018-05-22 ASSESSMENT — PAIN DESCRIPTION - PAIN TYPE: TYPE: ACUTE PAIN

## 2018-05-22 ASSESSMENT — PAIN DESCRIPTION - PROGRESSION: CLINICAL_PROGRESSION: NOT CHANGED

## 2018-05-22 ASSESSMENT — PAIN DESCRIPTION - DESCRIPTORS: DESCRIPTORS: ACHING

## 2018-05-22 ASSESSMENT — PAIN SCALES - GENERAL: PAINLEVEL_OUTOF10: 7

## 2018-05-22 ASSESSMENT — PAIN DESCRIPTION - LOCATION: LOCATION: FOOT

## 2018-05-22 ASSESSMENT — PAIN DESCRIPTION - ONSET: ONSET: ON-GOING

## 2018-05-22 ASSESSMENT — PAIN DESCRIPTION - ORIENTATION: ORIENTATION: RIGHT

## 2018-05-23 ENCOUNTER — TELEPHONE (OUTPATIENT)
Dept: INFECTIOUS DISEASES | Age: 68
End: 2018-05-23

## 2018-05-29 ENCOUNTER — HOSPITAL ENCOUNTER (OUTPATIENT)
Dept: WOUND CARE | Age: 68
Discharge: HOME | End: 2018-05-29
Attending: PODIATRIST | Admitting: PODIATRIST

## 2018-05-29 VITALS
DIASTOLIC BLOOD PRESSURE: 74 MMHG | SYSTOLIC BLOOD PRESSURE: 149 MMHG | RESPIRATION RATE: 18 BRPM | HEART RATE: 73 BPM | TEMPERATURE: 98 F

## 2018-05-29 DIAGNOSIS — L97.412 ULCER OF RIGHT HEEL, WITH FAT LAYER EXPOSED (HCC): ICD-10-CM

## 2018-05-29 ASSESSMENT — PAIN SCALES - GENERAL: PAINLEVEL_OUTOF10: 6

## 2018-05-29 ASSESSMENT — PAIN DESCRIPTION - DESCRIPTORS: DESCRIPTORS: ACHING

## 2018-05-29 ASSESSMENT — PAIN DESCRIPTION - PAIN TYPE: TYPE: ACUTE PAIN

## 2018-05-29 ASSESSMENT — PAIN DESCRIPTION - LOCATION: LOCATION: FOOT

## 2018-05-29 ASSESSMENT — PAIN DESCRIPTION - FREQUENCY: FREQUENCY: INTERMITTENT

## 2018-05-29 ASSESSMENT — PAIN DESCRIPTION - PROGRESSION: CLINICAL_PROGRESSION: NOT CHANGED

## 2018-05-29 ASSESSMENT — PAIN DESCRIPTION - ONSET: ONSET: ON-GOING

## 2018-05-31 LAB
ALBUMIN SERPL-MCNC: 3.5 G/DL
ALP BLD-CCNC: 37 U/L
ALT SERPL-CCNC: 20 U/L
ANION GAP SERPL CALCULATED.3IONS-SCNC: NORMAL MMOL/L
AST SERPL-CCNC: 16 U/L
BASOPHILS ABSOLUTE: ABNORMAL /ΜL
BASOPHILS RELATIVE PERCENT: 0.5 %
BILIRUB SERPL-MCNC: 0.4 MG/DL (ref 0.1–1.4)
BUN BLDV-MCNC: 24 MG/DL
CALCIUM SERPL-MCNC: 9 MG/DL
CHLORIDE BLD-SCNC: 104 MMOL/L
CO2: 26 MMOL/L
CREAT SERPL-MCNC: 1.4 MG/DL
EOSINOPHILS ABSOLUTE: 0.5 /ΜL
EOSINOPHILS RELATIVE PERCENT: 8 %
GFR CALCULATED: 61
GLUCOSE BLD-MCNC: 93 MG/DL
HCT VFR BLD CALC: 29.9 % (ref 41–53)
HEMOGLOBIN: 10 G/DL (ref 13.5–17.5)
LYMPHOCYTES ABSOLUTE: 2.7 /ΜL
LYMPHOCYTES RELATIVE PERCENT: 43.5 %
MCH RBC QN AUTO: 28 PG
MCHC RBC AUTO-ENTMCNC: 33.3 G/DL
MCV RBC AUTO: 84 FL
MONOCYTES ABSOLUTE: 0.7 /ΜL
MONOCYTES RELATIVE PERCENT: 10.8 %
NEUTROPHILS ABSOLUTE: 2.3 /ΜL
NEUTROPHILS RELATIVE PERCENT: 37.2 %
PLATELET # BLD: 158 K/ΜL
PMV BLD AUTO: 8.3 FL
POTASSIUM SERPL-SCNC: 5.1 MMOL/L
RBC # BLD: 3.56 10^6/ΜL
SEDIMENTATION RATE, ERYTHROCYTE: 13
SODIUM BLD-SCNC: 135 MMOL/L
TOTAL PROTEIN: 7.1
WBC # BLD: 6.3 10^3/ML

## 2018-06-12 ENCOUNTER — TELEPHONE (OUTPATIENT)
Dept: WOUND CARE | Age: 68
End: 2018-06-12

## 2018-06-13 ENCOUNTER — TELEPHONE (OUTPATIENT)
Dept: WOUND CARE | Age: 68
End: 2018-06-13

## 2018-06-19 ENCOUNTER — TELEPHONE (OUTPATIENT)
Dept: WOUND CARE | Age: 68
End: 2018-06-19

## 2018-06-27 ENCOUNTER — TELEPHONE (OUTPATIENT)
Dept: WOUND CARE | Age: 68
End: 2018-06-27

## 2018-08-24 PROBLEM — L03.90 CELLULITIS: Status: ACTIVE | Noted: 2018-01-01

## 2018-08-24 NOTE — PROGRESS NOTES
Home Med List is currently In Process. Information is currently being gathered. Sources I have tried are interview with pt,however pt is unable to communicate effectively at this time, speech is garbled. Medication list will need re-assessment.   Conrado Renner RN

## 2018-08-24 NOTE — H&P
tablet Take 1 tablet by mouth every 6 hours as needed for Pain. Tara Lopez Historical Provider, MD   daptomycin (CUBICIN) 500 MG injection Infuse 450 mg intravenously daily    Historical Provider, MD   polyethylene glycol (MIRALAX) PACK packet Take 17 g by mouth daily    Historical Provider, MD   insulin glargine (BASAGLAR KWIKPEN) 100 UNIT/ML injection pen Inject 31 Units into the skin nightly     Historical Provider, MD   tuberculin (TUBERSOL) 5 UNIT/0.1ML injection Inject 5 Units into the skin once 2nd step 7 days after first step due on 5/5/2018    Historical Provider, MD   finasteride (PROSCAR) 5 MG tablet Take 5 mg by mouth daily    Historical Provider, MD   amLODIPine (NORVASC) 5 MG tablet Take 1 tablet by mouth daily 11/10/17   Derrek Bence, MD   carvedilol (COREG) 6.25 MG tablet Take 1 tablet by mouth 2 times daily (with meals) 9/8/17   Jurline Najjar, APRN - CNP   aspirin 81 MG chewable tablet Take 1 tablet by mouth daily 7/21/17   Jurline Najjar, APRN - CNP   gabapentin (NEURONTIN) 600 MG tablet Take 600 mg by mouth 2 times daily. Tara Lopez Historical Provider, MD   busPIRone (BUSPAR) 10 MG tablet Take 10 mg by mouth daily     Historical Provider, MD   losartan (COZAAR) 25 MG tablet Take 25 mg by mouth daily    Historical Provider, MD       Allergies:  Patient has no known allergies. Social History:      The patient currently lives alone in his apartment, for the past 18 months. He used to live with one of his daughters, but states, \"I did not like her trying to control me. \"     TOBACCO:   reports that he quit smoking about 25 years ago. His smoking use included Cigarettes. He has never used smokeless tobacco.  ETOH:   reports that he drinks alcohol. Drug use: history of cocaine use per chart review. Family History:       Reviewed in detail and negative for CAD, Cancer, CVA.  Positive as follows:    Family History   Problem Relation Age of Onset    Kidney Disease Father     Kidney Disease Brother lobe cortex and chronic ischemic infarcts left cerebellar hemisphere, all stable      Interval development of bandlike ischemic infarct right periventricular white matter, of indeterminate age (although new since May 2017      No acute intracranial hemorrhage or signs of mass effect          ASSESSMENT & PLAN:  Mamadou High is a 70 y.o. male w/ PMH of left sided CVA with expressive aphasia and right sided weakness baseline, seizure (not on medications), DM2, osteomyelitis of the left foot, right foot metatarsal amputation, PAD, CKD 3, CAD, systolic & diastolic heart failure that presented with increased lethargy, weakness and temporarily increased neurologic deficits concerning for possible stroke. Admitted for seizure work-up in the setting of lethargy  with negative acute head CT findings , LLE cellulitis, MADYSON on CKD 3. Active Hospital Problems    Diagnosis Date Noted    Cellulitis [L03.90] 08/24/2018     Encephalopathy, lethargy (previous concern for stroke)   -P/w recently increased sleepiness, lethargy, poor apetite, subjective feeling, \"shaky\", no witnessed seizures  - In the ED: slurred speech, right sided neglect, possible weakness of the left upper and lower extremities that resolved to baseline after returning from CT.   -Left sided CVA 2013: expressive aphasia and right sided weakness  -Seizure w/ whole upper body shaking, mouth foaming, 3 mins and no-precipitating event reported in June 2016 with non-diagnostic EEG (minimal slowing), no seizures during hospital stay. Was placed on keppra and was supposed to be on it for at least two years. (currently not on medication)   -History of cocaine use   -Recently grieving loss of brother   -CT head: remote infarcts left frontal lobe, left cerebellar, development of bandlike ischemic infarct in periventricular white matter (new from May 2017), no acute findings.    CT head/neck: No significant stenosis/occlusion   -Neurologic exam: unable to follow commands for EOMI, lower right sided facial droop but strength and sensation to light touch intact in extremities (mild decrease right sided motor function), weakness on cerebellar exam.   -Alert and oriented x 4  -DDX: post-ictal seizure, TIA, encephalopathy 2/2 to infection, drug use, grieving component   Plan   -Neurology following, appreciate recs   -Keppra 1,000 mg loading dose now IV  -Keppra 500 mg BID IV   -Keppra level pending   -Seizure precautions   -MRI brain ordered per neurology   -EEG ordered per neurology   -UDS  -Treat infection as below    LLE cellulitis   -Previous osteomyelitis of the RLE: treated with IV zoysn base of the 5th metatarsal, I & D w/ MRSA and daptomycin. Vancomycin HALEY of 2 previously. -Erythema of the skin LLE with pitting edema, warm to touch; soft/liquid stools and some sputum production. -Afebrile, fluid responsive to 2 L of NS/LR in the ED  -Given 1 g Ancef in the ED   -No sacral ulcer, other wounds  Plan   -Blood cultures x 2   -IVF NS 75 mL/hr   -Trend lactate q6h x 2  -Vancomycin, pharmacy to dose   -Cefepime, 2 grams IV BID   -Metronidazole 500 mg IV q8h (anaerobic coverage)  -ID consulted for history of MRSA requiring treatment with daptomycin, concern MDR organisms. Elevated troponin in the setting of CAD  -History of CAD, mild per angiogram in 2012 with Dr. Alysia Mcmahan   -No chest pain, SOB  -ECG: PVC's, left axis deviation, no acute changes from last ECG   -History of cocaine use, kidney disease  Plan   -Trend troponin q6h x 2   -Continue ASA  -Continue Statin     MADYSON on CKD 3   Baseline 1.2-1.4; at admission POC Cr 2.3  Unclear underlying etiology, likely complicated by diabetes II   Plan  -RFP  -IVF NS 75 mL/hr   -Strict I/O's   -Bladder scan for any obstruction     Chronic:   Type II Diabetes Mellitus  Chronic, stable. Known diabetic complications:nephropathy, peripheral neuropathy, cerebrovascular disease and peripheral vascular disease.  Home diabetic medications include

## 2018-08-24 NOTE — ED NOTES
Pt meets MD with EMS to go straight to CT scanner per code stroke. Pt to ED for AMS and lethargy with onset of symptoms x 1 hour.       Estephania Badillo RN  08/24/18 [id number]

## 2018-08-24 NOTE — ED PROVIDER NOTES
focal consolidation. CTA HEAD W CONTRAST   Final Result      No evidence of flow-limiting stenosis. 0% stenosis of bilateral proximal ICAs by NASCET CRITERIA. PROCEDURE: CT ANGIOGRAPHY HEAD WITH/WITHOUT CONTRAST      INDICATION: STROKE,       COMPARISON: none      TECHNIQUE: Axial CT imaging obtained through the head prior to and following administration of IV contrast. Axial images, multiplanar reformatted images, and maximum intensity projection images were reviewed for CT angiographic technique. IV contrast: mL Omnipaque 350. FINDINGS:      ANTERIOR CIRCULATION: The intracranial internal carotid arteries, anterior cerebral arteries, and middle cerebral arteries demonstrate no occlusion or significant stenosis. Atherosclerotic calcification with mild narrowing of bilateral carotid cavernous    segments. No evidence for aneurysm or arteriovenous malformation. POSTERIOR CIRCULATION: The bilateral vertebral arteries, basilar artery and posterior cerebral arteries demonstrate no occlusion or stenosis. Basilar artery is small in caliber with bilateral fetal-type PCAs. No evidence for aneurysm or arteriovenous    malformation. INTRACRANIAL VENOUS SYSTEM: No evidence for intracranial venous thrombosis. IMPRESSION:      No evidence of significant stenosis or occlusion of major intracranial arteries. CTA NECK W CONTRAST   Final Result      No evidence of flow-limiting stenosis. 0% stenosis of bilateral proximal ICAs by NASCET CRITERIA. PROCEDURE: CT ANGIOGRAPHY HEAD WITH/WITHOUT CONTRAST      INDICATION: STROKE,       COMPARISON: none      TECHNIQUE: Axial CT imaging obtained through the head prior to and following administration of IV contrast. Axial images, multiplanar reformatted images, and maximum intensity projection images were reviewed for CT angiographic technique. IV contrast: mL Omnipaque 350.       FINDINGS:      ANTERIOR CIRCULATION: The

## 2018-08-25 PROBLEM — L03.116 LEFT LEG CELLULITIS: Status: ACTIVE | Noted: 2018-01-01

## 2018-08-25 NOTE — CONSULTS
[facility]    Non-healing wound of lower extremity    Osteomyelitis of left foot (HCC)    Ischemic ulcer diabetic foot (HCC)    Seizure disorder (HCC)    Elevated troponin    CKD (chronic kidney disease) stage 3, GFR 30-59 ml/min    Toe osteomyelitis, left (HCC)    Critical lower limb ischemia    S/P angioplasty with stent    Subacute osteomyelitis of right foot ([facility]    Osteomyelitis of right foot ([facility]    Coronary artery disease involving native coronary artery of native heart without angina pectoris    Pure hypercholesterolemia    Ulcer of right heel, with fat layer exposed ([facility]    Cellulitis       # Multiple medical co-morbidities - CVA, PVD, CHF, CKD  # DM, neuropathy, hx R TMA  # Hx L DFI with resection 1/2 digits, cult + MRSA, GBS  # R foot plantar ulcer, cult + MRSA (vanco HALEY 2)  OR 4/19 - 5th ray resection - soft bone per brief OR note. Path with chronic osteomyelitis  Return to OR 4/25 - tendon transfer, no purulence or sign active infection.   Rx daptomycin    Neurologic event - now seems to be at baseline  L LE cellulitis  No R foot infection    RECOMMENDATIONS:    Cont vancomycin   D/c cefepime, flagyl    Discussed with pt  Maribel Da Silva MD

## 2018-08-25 NOTE — PROGRESS NOTES
1229  08/24/18   1230  08/25/18   0014   TROPONINI  0.09  0.05*  0.03*       BNP:   Recent Labs      08/24/18   1228   BNP  207.0*       Lipids: No results for input(s): CHOL, HDL in the last 72 hours. Invalid input(s): LDLCALCU    ABGs: No results found for: PHART, ALC6IXK, PO2ART    INR:   Recent Labs      08/24/18   1830   INR  1.08       U/A:  Recent Labs      08/24/18   1353  08/24/18   1619   COLORU  Yellow   --    PHUR  6.0  5.0   WBCUA  None seen   --    RBCUA  0-2   --    CLARITYU  Clear   --    SPECGRAV  1.020   --    LEUKOCYTESUR  Negative   --    UROBILINOGEN  0.2   --    BILIRUBINUR  Negative   --    BLOODU  LARGE*   --    GLUCOSEU  >=1000*   --    AMORPHOUS  4+*   --       -----------------------------------------------------------------  RAD:   XR CHEST STANDARD (2 VW)   Final Result      Bibasilar atelectasis. No focal consolidation. CTA HEAD W CONTRAST   Final Result      No evidence of flow-limiting stenosis. 0% stenosis of bilateral proximal ICAs by NASCET CRITERIA. PROCEDURE: CT ANGIOGRAPHY HEAD WITH/WITHOUT CONTRAST      INDICATION: STROKE,       COMPARISON: none      TECHNIQUE: Axial CT imaging obtained through the head prior to and following administration of IV contrast. Axial images, multiplanar reformatted images, and maximum intensity projection images were reviewed for CT angiographic technique. IV contrast: mL Omnipaque 350. FINDINGS:      ANTERIOR CIRCULATION: The intracranial internal carotid arteries, anterior cerebral arteries, and middle cerebral arteries demonstrate no occlusion or significant stenosis. Atherosclerotic calcification with mild narrowing of bilateral carotid cavernous    segments. No evidence for aneurysm or arteriovenous malformation. POSTERIOR CIRCULATION: The bilateral vertebral arteries, basilar artery and posterior cerebral arteries demonstrate no occlusion or stenosis.  Basilar artery is small in caliber with bilateral signs of mass effect      MRI BRAIN W WO CONTRAST    (Results Pending)       EK18 sinus tachycardia, PACs    Echo: 17- Conclusions      Summary   Left ventricle size is normal with mild concentric left ventricular   hypertrophy.   Global ejection fraction is low normal and estimated from 50 % to 55 %.   No regional wall motion abnormalities are noted.   Diastolic filling parameters suggests grade I diastolic dysfunction.   Mitral annular calcification   The mitral valve leaflets are thickened with normal leaflet mobility.   Mild mitral regurgitation   There is mild tricuspid regurgitation with RVSP estimated at 38 mmHg.   A bubble study was performed and shows evidence of right to left shunting   consistent with a patent foramen ovale or atrial septal defect. Micro: blood cx pending   UA positive for large blood, protein    Assessment/Plan:   Bruce Rodriguez is a 70 y.o. admitted for encephalopathy    Encephalopathy, lethargy- on admission, previous concern for stroke vs. Seizure. UDS +for cocaine, enies cocaine use, last used marijuana 2 days ago. Hx of seizure and CVA, not on antiepileptic.  - neuro consulted  - MRI brain wo contrast (pt has MADYSON)  - EEG  - continue keppra    HFpEF- grade I diastolic dysfunction  - monitor volume status  - holding beta blocker in setting of positive cocaine    Left leg cellulitis- in setting of DM, neuropathy. Previous osteomyelitis of the RLE: treated with IV zoysn base of the 5th metatarsal, I & D w/ MRSA and daptomycin. Vancomycin HALEY of 2 previously. - ID consulted- cefepime, flagyl d/c.  Continue vancomycin  - venous doppler ordered  - lactate improving    MADYSON on CKDIII- Baseline 1.2-1.4; at admission POC Cr 2.3  -IVF NS 75 mL/hr   - Daily renal    Type II DM- Last Hb A1c= 8.8 on 2018  -Lantus 30 u night  - Medium dose sliding scale insulin   - Accuchecks  - Hypoglycemia protocol    Elevated troponin in the setting of CAD  - down trending, stable  -Continue

## 2018-08-26 NOTE — PROGRESS NOTES
INTERNAL MEDICINE TRANSFER ACCEPT NOTE    S: Briefly this is a 71 yo M PMH L sided CVA, seizure hx non on medication, HF, DM2, CKD3, osteo of L foot with MRSA treated with daptomycin, amputation b/l, cocaine use, CAD, PAD p/w increased lethargy, AMS, for concerns of stroke. CT head negative for acute pathology, chronic infarcts present confirmed with MRI. Has LLE cellulitis, elevated lactate, trops. Septic and hypotensive on admission, fluid responsive. Admitted for sepsis, MADYSON on CKD3 and LLE cellulitis. A/O x 4 on admission, R sided deficits at baseline (dysarthria, R sided facial deficit). Concerns for seizures, on keppa 500 BID. A/O x 1 this morning. Very lethargic. Febrile, WBC elevated. Weeping leg. Collins cultured this morning. Vanc/cefepime/metro, only on vanc now. In ICU for cvEEG. On b-bl for cocaine. Ammonia lvl to f/u, c. Diff, lactate, Echo, on EEG, bladder scans q shift (no talbot)    Today patient***    O:  /73   Pulse 76   Temp 100.2 °F (37.9 °C) (Oral)   Resp 18   Ht 5' 11\" (1.803 m)   Wt 195 lb 1.7 oz (88.5 kg)   SpO2 100%   BMI 27.21 kg/m²     General appearance: Lying in bed in no acute distress, appears stated age, cooperative  HEENT Normal cephalic, atraumatic, EOMI, PERRL, no LAD, no oropharyngeal erythema  Lungs: CTAB, no wheezes, rhonchi or rales  Heart: RRR +S1/S2 without murmurs, rubs or gallops  Abdomen: Soft, NTND, without rigidity or guarding, normal active bowel sounds  Extremities: no edema  Skin: no rashes  Neurologic: AAOx4, CNs II-XII grossly intact, moving all extremities spontaneously    A/P:    ***    FEN: DIET CARB CONTROL;  PPx: ***  CODE: Full Code  DISPO: ***ICU/GMF    The objective and subjective findings as well as the ICU course of treatment have been reviewed with the ICU team. The treatment plan has been reviewed with the ICU team. The patient is being transferred to [address] in stable condition.  ***RemoveForTransferPriscilla Shea,

## 2018-08-26 NOTE — PROGRESS NOTES
extremities     Gait: gait was deferred for safety    I reviewed the following laboratory and imaging study reports, and I independently reviewed the following imaging studies.   Labs  Recent Results (from the past 36 hour(s))   POCT Glucose    Collection Time: 08/25/18  8:18 AM   Result Value Ref Range    POC Glucose 113 (H) 70 - 99 mg/dl    Performed on ACCU-CHEK    CBC auto differential    Collection Time: 08/25/18 11:53 AM   Result Value Ref Range    WBC 17.8 (H) 4.0 - 11.0 K/uL    RBC 4.52 4.20 - 5.90 M/uL    Hemoglobin 12.3 (L) 13.5 - 17.5 g/dL    Hematocrit 37.8 (L) 40.5 - 52.5 %    MCV 83.6 80.0 - 100.0 fL    MCH 27.3 26.0 - 34.0 pg    MCHC 32.7 31.0 - 36.0 g/dL    RDW 15.6 (H) 12.4 - 15.4 %    Platelets 945 (L) 992 - 450 K/uL    MPV 8.6 5.0 - 10.5 fL    Neutrophils % 78.0 %    Lymphocytes % 14.0 %    Monocytes % 5.0 %    Eosinophils % 0.0 %    Basophils % 0.0 %    Neutrophils # 13.9 (H) 1.7 - 7.7 K/uL    Lymphocytes # 3.0 1.0 - 5.1 K/uL    Monocytes # 0.9 0.0 - 1.3 K/uL    Eosinophils # 0.0 0.0 - 0.6 K/uL    Basophils # 0.0 0.0 - 0.2 K/uL    Atypical Lymphocytes Relative 3 0 - 6 %    Toxic Granulation Present (A)     Dohle Bodies Present (A)     Smudge Cells Present (A)     Emmy Cells Occasional (A)     Tear Drop Cells Occasional (A)    Renal function panel    Collection Time: 08/25/18 11:53 AM   Result Value Ref Range    Sodium 129 (L) 136 - 145 mmol/L    Potassium 3.7 3.5 - 5.1 mmol/L    Chloride 95 (L) 99 - 110 mmol/L    CO2 24 21 - 32 mmol/L    Anion Gap 10 3 - 16    Glucose 161 (H) 70 - 99 mg/dL    BUN 20 7 - 20 mg/dL    CREATININE 1.8 (H) 0.8 - 1.3 mg/dL    GFR Non- 38 (A) >60    GFR  45 (A) >60    Calcium 8.0 (L) 8.3 - 10.6 mg/dL    Phosphorus 1.5 (L) 2.5 - 4.9 mg/dL    Alb 2.6 (L) 3.4 - 5.0 g/dL   Troponin    Collection Time: 08/25/18 11:53 AM   Result Value Ref Range    Troponin 0.03 (H) <0.01 ng/mL   Calcium, ionized    Collection Time: 08/25/18 11:53 AM   Result Value Ref Range    Calcium, Ion 1.05 (L) 1.12 - 1.32 mmol/L    pH, Bhupendra 7.304 (L) 7.350 - 7.450   POCT Glucose    Collection Time: 08/25/18 12:12 PM   Result Value Ref Range    POC Glucose 186 (H) 70 - 99 mg/dl    Performed on ACCU-CHEK    POCT Glucose    Collection Time: 08/25/18  5:08 PM   Result Value Ref Range    POC Glucose 164 (H) 70 - 99 mg/dl    Performed on ACCU-CHEK    Basic Metabolic Panel w/ Reflex to MG    Collection Time: 08/25/18  7:41 PM   Result Value Ref Range    Sodium 128 (L) 136 - 145 mmol/L    Potassium reflex Magnesium 3.6 3.5 - 5.1 mmol/L    Chloride 94 (L) 99 - 110 mmol/L    CO2 23 21 - 32 mmol/L    Anion Gap 11 3 - 16    Glucose 169 (H) 70 - 99 mg/dL    BUN 18 7 - 20 mg/dL    CREATININE 1.7 (H) 0.8 - 1.3 mg/dL    GFR Non- 40 (A) >60    GFR  49 (A) >60    Calcium 8.0 (L) 8.3 - 10.6 mg/dL   POCT Glucose    Collection Time: 08/25/18  9:03 PM   Result Value Ref Range    POC Glucose 176 (H) 70 - 99 mg/dl    Performed on ACCU-CHEK    Protein, urine, random    Collection Time: 08/26/18  4:57 AM   Result Value Ref Range    Protein, Ur 101.10 (H) <12 mg/dL   Creatinine, Random Urine    Collection Time: 08/26/18  4:57 AM   Result Value Ref Range    Creatinine, Ur 47.9 39.0 - 259.0 mg/dL   Sodium, urine, random    Collection Time: 08/26/18  4:57 AM   Result Value Ref Range    Sodium, Ur 29 Not Established mmol/L   Chloride, Random Urine    Collection Time: 08/26/18  4:57 AM   Result Value Ref Range    Chloride 35 Not Established mmol/L   CBC auto differential    Collection Time: 08/26/18  5:35 AM   Result Value Ref Range    WBC 18.7 (H) 4.0 - 11.0 K/uL    RBC 4.68 4.20 - 5.90 M/uL    Hemoglobin 12.9 (L) 13.5 - 17.5 g/dL    Hematocrit 39.0 (L) 40.5 - 52.5 %    MCV 83.3 80.0 - 100.0 fL    MCH 27.6 26.0 - 34.0 pg    MCHC 33.1 31.0 - 36.0 g/dL    RDW 15.7 (H) 12.4 - 15.4 %    Platelets 123 (L) 516 - 450 K/uL    MPV 9.0 5.0 - 10.5 fL    Neutrophils % 79.0 %    Lymphocytes % 12.0 %    Monocytes % 0.0 %    Eosinophils % 0.0 %    Basophils % 0.0 %    Neutrophils # 16.5 (H) 1.7 - 7.7 K/uL    Lymphocytes # 2.2 1.0 - 5.1 K/uL    Monocytes # 0.0 0.0 - 1.3 K/uL    Eosinophils # 0.0 0.0 - 0.6 K/uL    Basophils # 0.0 0.0 - 0.2 K/uL    Bands Relative 9 (H) 0 - 7 %    Toxic Granulation Present (A)     Dohle Bodies Present (A)    Renal function panel    Collection Time: 08/26/18  5:35 AM   Result Value Ref Range    Sodium 130 (L) 136 - 145 mmol/L    Potassium 3.8 3.5 - 5.1 mmol/L    Chloride 97 (L) 99 - 110 mmol/L    CO2 23 21 - 32 mmol/L    Anion Gap 10 3 - 16    Glucose 99 70 - 99 mg/dL    BUN 16 7 - 20 mg/dL    CREATININE 1.5 (H) 0.8 - 1.3 mg/dL    GFR Non- 46 (A) >60    GFR  56 (A) >60    Calcium 8.0 (L) 8.3 - 10.6 mg/dL    Phosphorus 1.8 (L) 2.5 - 4.9 mg/dL    Alb 2.3 (L) 3.4 - 5.0 g/dL   Magnesium    Collection Time: 08/26/18  5:35 AM   Result Value Ref Range    Magnesium 1.60 (L) 1.80 - 2.40 mg/dL   CK    Collection Time: 08/26/18  5:35 AM   Result Value Ref Range    Total  (H) 39 - 308 U/L   POCT Glucose    Collection Time: 08/26/18  7:49 AM   Result Value Ref Range    POC Glucose 134 (H) 70 - 99 mg/dl    Performed on ACCU-CHEK    Levetiracetam Level    Collection Time: 08/26/18  9:49 AM   Result Value Ref Range    KEPPRA Dose Amt Unknown    POCT Glucose    Collection Time: 08/26/18 11:14 AM   Result Value Ref Range    POC Glucose 160 (H) 70 - 99 mg/dl    Performed on ACCU-CHEK    Lactic acid, plasma    Collection Time: 08/26/18 12:03 PM   Result Value Ref Range    Lactic Acid 1.3 0.4 - 2.0 mmol/L   Ammonia    Collection Time: 08/26/18 12:03 PM   Result Value Ref Range    Ammonia 22 16 - 60 umol/L          Studies  XR CHEST PORTABLE   Final Result      Unchanged relative elevation of the right diaphragm. No acute cardiopulmonary findings.          MRI BRAIN WO CONTRAST   Final Result      Evidence of diffuse chronic small vessel white matter disease

## 2018-08-26 NOTE — DISCHARGE SUMMARY
Soft, non-tender, non-distended with normal bowel sounds. Musculoskeletal:  No clubbing, cyanosis or edema bilaterally. Full range of motion without deformity. Skin: Skin color, texture, turgor normal.  No rashes or lesions. Neurologic:  Neurovascularly intact without any focal sensory/motor deficits. Cranial nerves: II-XII intact, grossly non-focal.  Psychiatric:  Alert and oriented, thought content appropriate, normal insight  Capillary Refill: Brisk,< 3 seconds   Peripheral Pulses: +2 palpable, equal bilaterally       Labs: For convenience and continuity at follow-up the following most recent labs are provided:      CBC:    Lab Results   Component Value Date    WBC 21.6 08/31/2018    HGB 9.5 08/31/2018    HCT 28.9 08/31/2018     08/31/2018       Renal:    Lab Results   Component Value Date     08/31/2018    K 3.4 08/31/2018    K 3.6 08/25/2018     08/31/2018    CO2 25 08/31/2018    BUN 12 08/31/2018    CREATININE 1.1 08/31/2018    CALCIUM 7.8 08/31/2018    PHOS 2.6 08/31/2018         Significant Diagnostic Studies    Radiology:   XR CHEST PORTABLE   Final Result      1. Mild atelectasis in the lung bases. VL Extremity Venous Bilateral   Final Result      CT HEAD WO CONTRAST   Final Result      Atrophy and white matter ischemic changes with evidence of prior infarcts. No acute hemorrhage or mass effect seen. XR CHEST PORTABLE   Final Result      Unchanged relative elevation of the right diaphragm. No acute cardiopulmonary findings. MRI BRAIN WO CONTRAST   Final Result      Evidence of diffuse chronic small vessel white matter disease bilaterally. Remote left frontoparietal cortical and cerebellar infarcts. No acute intracranial findings. XR CHEST STANDARD (2 VW)   Final Result      Bibasilar atelectasis. No focal consolidation. CTA HEAD W CONTRAST   Final Result      No evidence of flow-limiting stenosis.       0% 5 Units into the skin nightly, Disp-5 pen, R-3Print              Details   fluticasone (FLONASE) 50 MCG/ACT nasal spray 1 spray by Each Nare route dailyHistorical Med      tamsulosin (FLOMAX) 0.4 MG capsule Take 0.4 mg by mouth dailyHistorical Med      insulin lispro (HUMALOG) 100 UNIT/ML injection vial Inject into the skin 3 times daily (before meals) 0-149=0, 150-199=2, 200-249=2, 250-299=3, 300-349=4, 350-399=5, 400 or above 6 units and call MDHistorical Med      oxyCODONE-acetaminophen (PERCOCET) 5-325 MG per tablet Take 1 tablet by mouth every 6 hours as needed for Pain. [place] Historical Med      polyethylene glycol (MIRALAX) PACK packet Take 17 g by mouth dailyHistorical Med      finasteride (PROSCAR) 5 MG tablet Take 5 mg by mouth dailyHistorical Med      amLODIPine (NORVASC) 5 MG tablet Take 1 tablet by mouth daily, Disp-90 tablet, R-3Please consider 90 day supplies to promote better adherenceNormal      aspirin 81 MG chewable tablet Take 1 tablet by mouth daily, Disp-90 tablet, R-3Print      busPIRone (BUSPAR) 10 MG tablet Take 10 mg by mouth daily Historical Med             Time Spent on discharge is more than 1.5 hours in the examination, evaluation, counseling and review of medications and discharge plan. Signed:    Sun Mg MD   9/3/2018      Thank you Ernestina Oates for the opportunity to be involved in this patient's care. If you have any questions or concerns please feel free to contact me at [phone]. Patient seen and evaluated with the medical resident. I was physically present during the critical portions of the service when performed by the resident including the assessment and management of the patient. I agree with the findings and plans as described. Sepsis, POA, in the setting of L leg Cellulitis being treated with IVF's, Vancomycin, Ceftriaxone, lactate trend.   Improved during course of hospitaliztion    Doing well, neurostatus stable  BG trend noted, he has not be eating

## 2018-08-26 NOTE — PROGRESS NOTES
Talbot placed per order. Used standard talbot and could not advance, took out and scant amount of blood on tip of catheter. Switched to intermittent catheter  then changed to standard bag. Patient has clear, yellow urine output.

## 2018-08-26 NOTE — PROGRESS NOTES
Internal Medicine PGY-1 Resident Progress Note    PCP: Yevgeniy Chun    Date of Admission: 8/24/2018    Chief Complaint: \"tired\" Presented as possible code stroke     Hospital Course: Elisa Tang is a 70 y.o. male w/ PMH of left sided CVA with expressive aphasia and right sided weakness baseline, seizure (not on medications), DM2, osteomyelitis of the left foot resistant to vancomycin, right foot metatarsal amputation, PAD, CKD 3, CAD, systolic & diastolic heart failure that presented with increased lethargy, weakness and temporarily increased neurologic deficits concerning for possible stroke. Admitted for seizure work-up in the setting of lethargy with negative acute head CT findings , LLE cellulitis, MADYSON on CKD 3. His UDS was positive for cocaine. Encephalopathy multifactorial: cocaine use, possible post-ictal confusion on admission. Neurology consulted. CT brain, MRI brain without acute findings, old infarcts of the left frontoparietal cortex and cerebellum. CTA head and neck reported no flow limiting stenosis. Valproic acid levels low,unsure if taking this medication, and not taking keppra by history. Loaded with 1 g Keppra, started on 500 mg IV BID maintenance. EEG, CK level trended, seizure precautions, starting antiplatelet and stain as stroke preventive therapy. Left lower extremity cellulitis on presentation with elevated lactate, hypotension. ID consulted given history of MRSA positive osteomyelitis in April treated with daptomycin. He was started on vancomycin, cefepime and flagyl and de-esclated to vancomycin alone with pharmacy dosing.       Subjective: Tmax 102.5, given tylenol 650 mg x 1. He says, \"I'm very tired\" today and has a hard time following our conversation. He is unable to follow some commands for the physical exam, and is alert and oriented to name and month only.  He denies headaches, chest pain, heart palpitations, shortness of breath, abdominal pain, constipation, pain with and middle cerebral arteries demonstrate no occlusion or significant stenosis. Atherosclerotic calcification with mild narrowing of bilateral carotid cavernous    segments. No evidence for aneurysm or arteriovenous malformation. POSTERIOR CIRCULATION: The bilateral vertebral arteries, basilar artery and posterior cerebral arteries demonstrate no occlusion or stenosis. Basilar artery is small in caliber with bilateral fetal-type PCAs. No evidence for aneurysm or arteriovenous    malformation. INTRACRANIAL VENOUS SYSTEM: No evidence for intracranial venous thrombosis. IMPRESSION:      No evidence of significant stenosis or occlusion of major intracranial arteries. CTA NECK W CONTRAST   Final Result      No evidence of flow-limiting stenosis. 0% stenosis of bilateral proximal ICAs by NASCET CRITERIA. PROCEDURE: CT ANGIOGRAPHY HEAD WITH/WITHOUT CONTRAST      INDICATION: STROKE,       COMPARISON: none      TECHNIQUE: Axial CT imaging obtained through the head prior to and following administration of IV contrast. Axial images, multiplanar reformatted images, and maximum intensity projection images were reviewed for CT angiographic technique. IV contrast: mL Omnipaque 350. FINDINGS:      ANTERIOR CIRCULATION: The intracranial internal carotid arteries, anterior cerebral arteries, and middle cerebral arteries demonstrate no occlusion or significant stenosis. Atherosclerotic calcification with mild narrowing of bilateral carotid cavernous    segments. No evidence for aneurysm or arteriovenous malformation. POSTERIOR CIRCULATION: The bilateral vertebral arteries, basilar artery and posterior cerebral arteries demonstrate no occlusion or stenosis. Basilar artery is small in caliber with bilateral fetal-type PCAs. No evidence for aneurysm or arteriovenous    malformation. INTRACRANIAL VENOUS SYSTEM: No evidence for intracranial venous thrombosis.          IMPRESSION: -Lactate   -CXR  -Echocardiogram  -IVF NS 75 mL/hr   -Vancomycin, pharmacy to dose   -Stopped Cefepime and Metronidazole   -ID consulted and following    -VL extremities bilateral dopplers ordered      Elevated troponin in the setting of CAD  -History of CAD, mild per angiogram in 2012 with Dr. Tammy Herring   -No chest pain, SOB  -ECG: PVC's, left axis deviation, no acute changes from last ECG   -History of cocaine use, kidney disease  -UDS + cocaine  -Down-trending and stable  Plan   -Continue ASA  -Continue Statin      MADYSON on CKD 3   Baseline 1.2-1.4; at admission POC Cr 2.3  Unclear underlying etiology, likely complicated by diabetes II   Bladder scan 8/24 with 273 mL, good urine output   Cr improving with fluids   Plan  -RFP  -IVF NS 75 mL/hr   -Strict I/O's   -Continue daily bladder scans     Lactic acidosis secondary to dehydration   -5.61->4.27 with IVF  -NGTD of blood cultures, urine cultures   Plan   -IVF   -Trend lactate     Hypocalcemia, hypophosphatemia  - replacing    Chronic:   Type II Diabetes Mellitus  Chronic, stable. Known diabetic complications:nephropathy, peripheral neuropathy, cerebrovascular disease and peripheral vascular disease.  Home diabetic medications include insulin injections: Humalog : sliding scale at home and Lantus : 30 units nightly  Last A1C 8.8 4/2018  Plan:    -Lantus 30 u night  - Medium dose sliding scale insulin   - Accuchecks  - Hypoglycemia protocol     Heart failure, systolic and diastolic (grade I)   -Last echo: 5/15/2017: LV size normal, mild concentric LVH; EF 50-55%, grade I dysfunction, mild mitral regurg, mitral thickening and calcifications, + bubble.   -2012: angiogram with Dr. Bianca Alejandro; right dominant, 20% LAD, 10-20% circumflex, distal RCA 40%, LVEF 15-20%; minor cad with severe non-ischemic cardiomyopathy   -BNP ~4 K at admission, no JVD, pulmonary edema but positive pitting edema of bilateral lower extremities  Plan   -IVF NS 75 ml/hr   -Careful monitoring of fluid

## 2018-08-26 NOTE — PROGRESS NOTES
Patient admitted from the 5th floor. Alert and oriented to person, place, and time. No complaints of pain or discomfort. Skin intact. See nurse data base for assessment.

## 2018-08-27 NOTE — PROGRESS NOTES
Pt. With LLE open blisters, with loose fluctuant skin covering 80% of lower leg. Recommend Xeroform and ABD\"S with wraps to aid in decreasing 2+ edema, weak pedal pulses. Very dry skin to bilateral feet and lower legs where not blistered. Moisture barrier cream applied. Instructed pt. And [facility] and verbalized understanding/agreement with all. Orders obtaiend, on chart and dressed aqs ordered.

## 2018-08-27 NOTE — PROGRESS NOTES
Nephrology Progress Note     Cr better   Good UO   Na better     Past Medical History:   Diagnosis Date    CAD (coronary artery disease) 2012    minor, per cardiac cath    Diabetes mellitus ([address]     Diabetic infection of left foot ([address] 2013    w/osteomyelitis    H/O cocaine abuse 6/25/2015    History of CVA with residual deficit 6/8/2012    Hypertension     MRSA (methicillin resistant staph aureus) culture positive 05/12/2017; 4/17/18; 4/19/18    toe; R foot; bone     Non-healing wound of lower extremity 07/2016    L great toe    Non-ischemic cardiomyopathy ([address] 2012    per cardiac cath    P[address] (peripheral artery disease) [address] 07/26/2016    L PIPER - 0.6, unable to obtain readings on R    Peripheral neuropathy     Seizure, convulsion ([address] 07/2016    Dr. Jesus Leung - due to scar from old L frontal infarction    Unspecified cerebral artery occlusion with cerebral infarction 6/8/2012       Past Surgical History:   Procedure Laterality Date    CARDIAC CATHETERIZATION  01/31/2012    Dr. Darling Castle Left 10/19/2017     INCISION AND DRAINAGE WITH AMPUTATION OF 2ND DIGIT LEFT FOOT    FOOT SURGERY Right 04/19/2018    RIGHTFOOT DEBRIDEMENT INCISION AND DRAINAGE WITH 5TH RAY RESECTION    FOOT SURGERY Right 04/25/2018    RIGHT TIBIALIS TENDON TRANSFER, REPEAT INCISION AND DRAINAGE    FOOT TENDON SURGERY Left 05/24/2013    , DPM - I&D to antebellum cortex w/wound closure, perineal tendon transfer    KNEE ARTHROSCOPY      OTHER SURGICAL HISTORY Left 05/17/2017    INCISION AND DRAINAGE, AMPUTATION OF HALLUX LEFT FOOT    TOE AMPUTATION Left 05/21/2013    Dr. Wander Law, DPM - I&D to bone cortex w/amputation of 5th metatarsal       Family History   Problem Relation Age of Onset    Kidney Disease Father     Kidney Disease Brother         reports that he quit smoking about 25 years ago. His smoking use included Cigarettes.  He has never used smokeless Labs:  CBC:   Recent Labs      08/25/18   1153  08/26/18   0535   WBC  17.8*  18.7*   HGB  12.3*  12.9*   PLT  118*  108*     BMP:    Recent Labs      08/25/18   1153  08/25/18   1941 08/26/18   0535   NA  129*  128*  130*   K  3.7  3.6  3.8   CL  95*  94*  97*   CO2  24  23  23   BUN  20  18  16   CREATININE  1.8*  1.7*  1.5*   GLUCOSE  161*  169*  99     Ca/Mg/Phos:   Recent Labs      08/24/18   1830  08/25/18   1153  08/25/18   1941 08/26/18   0535   CALCIUM  7.8*  8.0*  8.0*  8.0*   MG   --    --    --   1.60*   PHOS  1.8*  1.5*   --   1.8*     Hepatic: No results for input(s): AST, ALT, ALB, BILITOT, ALKPHOS in the last 72 hours. Troponin:   Recent Labs      08/24/18   1230  08/25/18   0014  08/25/18   1153   TROPONINI  0.05*  0.03*  0.03*     BNP:   Recent Labs      08/24/18   1228   BNP  207.0*     Lipids: No results for input(s): CHOL, TRIG, HDL, LDLCALC, LABVLDL in the last 72 hours. ABGs: No results for input(s): PHART, PO2ART, UWT0YAM in the last 72 hours. INR:   Recent Labs      08/24/18   1830   INR  1.08     UA:  Recent Labs      08/26/18   1630   COLORU  Yellow   CLARITYU  Clear   GLUCOSEU  Negative   BILIRUBINUR  Negative   KETUA  Negative   SPECGRAV  1.020   BLOODU  MODERATE*   PHUR  6.0   PROTEINU  100*   UROBILINOGEN  0.2   NITRU  Negative   LEUKOCYTESUR  Negative   LABMICR  YES   URINETYPE  Clean catch      Urine Microscopic:   Recent Labs      08/26/18   1630   BACTERIA  1+*   WBCUA  0-2   RBCUA  0-2     Urine Culture:   Recent Labs      08/24/18   1353   LABURIN  No growth at 18-36 hours     Urine Chemistry:   Recent Labs      08/26/18   0457   CLUR  35   LABCREA  47.9   PROTEINUR  101.10*   NAUR  29             IMAGING:  CT HEAD WO CONTRAST   Final Result      Atrophy and white matter ischemic changes with evidence of prior infarcts. No acute hemorrhage or mass effect seen.                      XR CHEST PORTABLE   Final Result      Unchanged relative elevation of the right INDICATION: STROKE,       COMPARISON: none      TECHNIQUE: Axial CT imaging obtained through the head prior to and following administration of IV contrast. Axial images, multiplanar reformatted images, and maximum intensity projection images were reviewed for CT angiographic technique. IV contrast: mL Omnipaque 350. FINDINGS:      ANTERIOR CIRCULATION: The intracranial internal carotid arteries, anterior cerebral arteries, and middle cerebral arteries demonstrate no occlusion or significant stenosis. Atherosclerotic calcification with mild narrowing of bilateral carotid cavernous    segments. No evidence for aneurysm or arteriovenous malformation. POSTERIOR CIRCULATION: The bilateral vertebral arteries, basilar artery and posterior cerebral arteries demonstrate no occlusion or stenosis. Basilar artery is small in caliber with bilateral fetal-type PCAs. No evidence for aneurysm or arteriovenous    malformation. INTRACRANIAL VENOUS SYSTEM: No evidence for intracranial venous thrombosis. IMPRESSION:      No evidence of significant stenosis or occlusion of major intracranial arteries. CT Head WO Contrast   Final Result      Remote ischemic infarct left frontal lobe cortex and chronic ischemic infarcts left cerebellar hemisphere, all stable      Interval development of bandlike ischemic infarct right periventricular white matter, of indeterminate age (although new since May 2017      No acute intracranial hemorrhage or signs of mass effect      VL Extremity Venous Bilateral    (Results Pending)       Assessment/Plan   1. MADYSON on CKD 3     2. HTN    3. Anemia    4. Acid- base/ Electrolyte imbalance - Hyponatremia     5.  L LE cellulitis       Plan   - MADYSON sec to hypovolemia  - dec IVF   - Ur studies - reviewed   - Monitor UO/renal function   - Monitor NA   - Labs in am     Recommend to dose adjust all medications  based on renal functions  AVOID NSAIDs  Avoid Nephrotoxins  Monitor

## 2018-08-27 NOTE — PROGRESS NOTES
Black, DO    dextrose 50 % solution 12.5 g, 12.5 g, Intravenous, PRN, Grace Black, DO, 12.5 g at 08/27/18 0828    glucagon (rDNA) injection 1 mg, 1 mg, Intramuscular, PRN, Grace Black, DO    dextrose 5 % solution, 100 mL/hr, Intravenous, PRN, Grace Black, DO    insulin lispro (HUMALOG) injection pen 0-12 Units, 0-12 Units, Subcutaneous, TID WC, Grace Black, DO, 2 Units at 08/26/18 1720    insulin lispro (HUMALOG) injection pen 0-6 Units, 0-6 Units, Subcutaneous, Nightly, Grace Black, DO, Stopped at 08/26/18 2112    heparin (porcine) injection 5,000 Units, 5,000 Units, Subcutaneous, 3 times per day, Migel Walden MD, 5,000 Units at 08/27/18 0556    0.9 % sodium chloride infusion, , Intravenous, Continuous, David Herrera MD, Last Rate: 40 mL/hr at 08/26/18 2300    levETIRAcetam (KEPPRA) 500 mg in sodium chloride 0.9 % 100 mL IVPB, 500 mg, Intravenous, Q12H, Migel Walden MD, Last Rate: 400 mL/hr at 08/27/18 0855, 500 mg at 08/27/18 0855      Exam:  Vitals:    08/27/18 0600   BP: 138/61   Pulse: 87   Resp: 27   Temp:    SpO2: 95%       Constitutional   Vital signs: BP, HR, and RR reviewed   General Alert, no distress, well-nourished  Eyes: fundoscopic exam unable to be visualized  Cardiovascular: pulses symmetric in all 4 extremities. No peripheral edema. Psychiatric: cooperative with examination, no psychotic behavior noted. Neurologic  Mental status:  orientation to person, not place, and time   General fund of knowledge able to state the name of the president   Memory poor recall   Attention intact  Language slightly dysarthric   Comprehension follows all commands. Cranial nerves:  CN2: Visual Fields full w/o extinction on confrontational testing,  CN 3,4,6: extraocular muscles intact,  CN5: facial sensation symmetric  CN7:face with mild right facial droop and dysarthria.    CN8: hearing grossly intact  CN9: palate elevated symmetrically  CN11: trap full strength on shoulder shrug  CN12: tongue midline with protrusion    Strength: No prontator drift. 5/5 strength in bilateral upper extremities and 4/5 in fabio lower extremities. Deep tendon reflexes: 2-/4 in all 4 extremities    Sensory: light touch intact in all 4 extremities. No sensory extinction on double simultaneous stimulation    Cerebellar/coordination: finger nose finger normal without ataxia     Tone: normal in all 4 extremities     Gait: gait was deferred for safety    I reviewed the following laboratory and imaging study reports, and I independently reviewed the following imaging studies. Labs     8/27/2018 03:46   Sodium 131 (L)   Potassium 3.7   Chloride 97 (L)   CO2 24   BUN 13   Creatinine 1.2   Anion Gap 10   GFR Non- >60   GFR African American >60   Glucose 79     \   8/27/2018 03:46   Total  (H)      8/27/2018 03:46   WBC 20.1 (H)   RBC 4.14 (L)   Hemoglobin Quant 11.4 (L)   Hematocrit 34.0 (L)   MCV 82.1   MCH 27.5   MCHC 33.5   MPV 9.1   RDW 15.7 (H)   Platelet Count 729 (L)       Studies  CT HEAD WO CONTRAST   Final Result      Atrophy and white matter ischemic changes with evidence of prior infarcts. No acute hemorrhage or mass effect seen. XR CHEST PORTABLE   Final Result      Unchanged relative elevation of the right diaphragm. No acute cardiopulmonary findings. MRI BRAIN WO CONTRAST   Final Result      Evidence of diffuse chronic small vessel white matter disease bilaterally. Remote left frontoparietal cortical and cerebellar infarcts. No acute intracranial findings. XR CHEST STANDARD (2 VW)   Final Result      Bibasilar atelectasis. No focal consolidation. CTA HEAD W CONTRAST   Final Result      No evidence of flow-limiting stenosis. 0% stenosis of bilateral proximal ICAs by NASCET CRITERIA.             PROCEDURE: CT ANGIOGRAPHY HEAD WITH/WITHOUT CONTRAST      INDICATION: STROKE,       COMPARISON: none contraindications. 8. The patient should follow up with Neurology as an outpatient after discharge.         ORTEGA Palma-CNP  [facility]  Ph: [phone]    A copy of this note was provided for the attending: Charlotte Powers MD .

## 2018-08-27 NOTE — PROGRESS NOTES
CONTINUOUS EEG    Name:  Mary Herring  Medical Record Number:  [id number]  Age: 70 y.o.    Gender: male  : 1950  Today's Date:  2018  Room:  [address] [place] [address]  Vital Signs   BP (!) 143/76   Pulse 91   Temp 98.8 °F (37.1 °C) (Oral)   Resp 28   Ht 5' 11\" (1.803 m)   Wt 194 lb 14.2 oz (88.4 kg)   SpO2 94%   BMI 27.18 kg/m²           Continuous EEG Testing Start Time:  20:21PM  18    Comments: Spoke with Kiesha at [facility], leads connected    Plan of Care: continue to monitor    Electronically signed by Jorge Scanlon on 2018 at 8:21 PM

## 2018-08-27 NOTE — PROGRESS NOTES
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 During ED course, symptoms neared complete resolution. Head CT demonstrated no acute findings. As a poor candidate for tPA he was started on Keppra 500 BID and admitted with concern for stroke. He subsequently had episodes of similar AMS and somnolence and was transferred to the ICU for continuous EEG monitoring. A wound on his LLE also was cultured prior to this admission (8/17) and found to be MRSA positive with sensitivity to Vancomycin. Overnight, patient was transferred to the ICU for continuous EEG monitoring. He also complained of LLE pain and was given 1mg Morphine. Since admission to ICU, EEG has demonstrated no seizure activity. Per neuro, the EEG has demonstrated underlying mild-moderate diffuse encephalopathy of non-specific etiology. ID has recommended continuing vancomycin and adding Ceftriaxone. Subject: Pt denied new deficits and says that at baseline his speech is slurred. Complained of LLE pain as mentioned above.     MEDICATIONS:   Scheduled Meds:   fluticasone  1 spray Each Nare Daily    tamsulosin  0.4 mg Oral Daily    cefTRIAXone (ROCEPHIN) IV  2 g Intravenous Q24H    vancomycin  1,000 mg Intravenous Q24H    aspirin  81 mg Oral Daily    busPIRone  10 mg Oral Daily    finasteride  5 mg Oral Daily    insulin glargine  30 Units Subcutaneous Nightly    polyethylene glycol  17 g Oral Daily    sodium chloride flush  10 mL Intravenous 2 times per day    insulin lispro  0-12 Units Subcutaneous TID WC    insulin lispro  0-6 Units Subcutaneous Nightly    heparin (porcine)  5,000 Units Subcutaneous 3 times per day    0535  08/27/18   0346   NA  128*  130*  131*   K  3.6  3.8  3.7   CL  94*  97*  97*   CO2  23  23  24   BUN  18  16  13   CREATININE  1.7*  1.5*  1.2   GLUCOSE  169*  99  79     ABGs: No results for input(s): PHART, EOV7TNL, PO2ART in the last 72 hours. Rad:      ASSESSMENT AND PLAN:   Bruce Rodriguez is a 70 y.o. male, who was admitted with new onset AMS with concern for stroke. There has been no evidence on EEG of seizure activity and has been stable. Possible move to medicine floors in the morning. Vancomycin+Cephtriaxone IV for MRSA infection. Trend WBC and monitor for fever. Encephalopathy, lethargy (previous concern for stroke)   -CT head: remote infarcts left frontal lobe, left cerebellar, development of bandlike ischemic infarct in periventricular white matter (new from May 2017), no acute findings. -CTA head/neck: No flow limiting stenosis  -MRI head: without acute findings, old infarcts of the left frontoparietal cortex and cerebellum. -UDS + for cocaine  - continuous EEG: no evidence of seizure to date   -Neurology following, appreciate recs   -Keppra 500 mg BID, increased to 750mg BID   -Seizure precautions   -Treat infection as below     LLE cellulitis   -Repeat blood cultures x 2, urine culture: pending    -Ammonia   -C.  Diff antigen   -Lactate   -CXR  -Echocardiogram  -IVF NS 75 mL/hr   -Vancomycin, pharmacy to dose   -Stopped Cefepime and Metronidazole   -ID consulted and following    -VL extremities bilateral dopplers ordered      MADYSON on CKD 3   -IVF NS 75 mL/hr   -Strict I/O's   -Continue daily bladder scans         Hypocalcemia, hypophosphatemia  - replacing     Chronic:   Type II Diabetes Mellitus   -Lantus 30 u night  - Medium dose sliding scale insulin   - Accuchecks  - Hypoglycemia protocol     Heart failure, systolic and diastolic (grade I)   -IVF NS 75 ml/hr   -Careful monitoring of fluid status   -Hold beta blockers with cocaine use   -Strict I/O's      Code Status:Full Code  FEN: carb

## 2018-08-28 NOTE — PROGRESS NOTES
chloride      dextrose       PRN Meds:acetaminophen, sodium chloride flush, magnesium hydroxide, ondansetron, glucose, dextrose, glucagon (rDNA), dextrose  Allergies: No Known Allergies    PHYSICAL EXAM:       Vitals: /66   Pulse 88   Temp 99.9 °F (37.7 °C) (Axillary)   Resp 29   Ht 5' 11\" (1.803 m)   Wt 194 lb 7.1 oz (88.2 kg)   SpO2 95%   BMI 27.12 kg/m²   I/O:      Intake/Output Summary (Last 24 hours) at 08/28/18 1123  Last data filed at 08/28/18 0958   Gross per 24 hour   Intake             1491 ml   Output              870 ml   Net              621 ml     I/O this shift:  In: 240 [P.O.:240]  Out: 60 [Urine:60]  I/O last 3 completed shifts: In: 3161 [P.O.:420; I.V.:601; IV Piggyback:350]  Out: 80 [Urine:810]    Physical Examination:   · General appearance: alert, appears stated age and cooperative, no acute respiratory or painful distress  · Skin: Skin color, texture, turgor normal.   · HEENT: PERRLA: Normocephalic, no lesions, without obvious abnormality.   · Neck: no adenopathy, no carotid bruit, no JVD, supple, symmetrical, trachea midline and thyroid not enlarged, symmetric, no tenderness/mass/nodules  · Lungs: clear to auscultation bilaterally  · Heart: regular rate and rhythm, S1, S2 normal, no murmur, click, rub or gallop  · Abdomen: soft, non-tender; bowel sounds normal; no masses,  no organomegaly  · Extremities: LLE weeping erythematous cellulitis, atraumatic, no cyanosis or edema, peripheral pulses 2+ bilaterally  · Lymphatic: No significant lymph node enlargement papable  · Neurologic: Right side facial droop  Mental status: Alert and oriented, thought content appropriate      DATA:       Labs:  CBC:   Recent Labs      08/26/18   0535  08/27/18   0346  08/28/18   0425   WBC  18.7*  20.1*  24.4*   HGB  12.9*  11.4*  11.3*   HCT  39.0*  34.0*  34.2*   PLT  108*  130*  203       BMP:   Recent Labs      08/26/18   0535  08/27/18   0346  08/28/18   0425   NA  130*  131*  131*   K  3.8  3.7 3.5   CL  97*  97*  96*   CO2  23  24  25   BUN  16  13  15   CREATININE  1.5*  1.2  1.4*   GLUCOSE  99  79  76     ABGs: No results for input(s): PHART, MEW4FKN, PO2ART in the last 72 hours. Rad:      ASSESSMENT AND PLAN:   Hubert Hoffmann is a 70 y.o. male, who was admitted with new onset AMS with concern for stroke. There has been no evidence on EEG of seizure activity and has been stable. Move to medical floors today. Vancomycin+Cephtriaxone IV for MRSA infection. Trend WBC and monitor for fever. Encephalopathy, lethargy (previous concern for stroke)   -CT head: remote infarcts left frontal lobe, left cerebellar, development of bandlike ischemic infarct in periventricular white matter (new from May 2017), no acute findings. -CTA head/neck: No flow limiting stenosis  -MRI head: without acute findings, old infarcts of the left frontoparietal cortex and cerebellum. -UDS + for cocaine  - continuous EEG: no evidence of seizure to date   -Neurology following, appreciate recs   -Keppra 500 mg BID, increased to 750mg BID   -Seizure precautions   -Treat infection as below     LLE cellulitis   -Repeat blood cultures x 2, urine culture: pending    -Ammonia   -C. Diff antigen   -Lactate   -IVF NS 75 mL/hr   - ID consulted and following    -VL extremities bilateral dopplers: no DVT on right, patient refused remainder of exam.   - continue ceftriaxone     MADYSON on CKD 3   -IVF NS 75 mL/hr   -Strict I/O's   -Continue daily bladder scans         Hypocalcemia, hypophosphatemia  - replacing     Chronic:   Type II Diabetes Mellitus   -decreased Lantus to 25u qhs   - Medium dose sliding scale insulin   - Accuchecks  - Hypoglycemia protocol     Heart failure, systolic and diastolic (grade I)   -IVF NS 75 ml/hr   -Careful monitoring of fluid status   -Hold beta blockers with cocaine use   -Strict I/O's      Code Status:Full Code  FEN: carb control diet   PPX: hep subq  DISPO: ICU for further management as above.      Sourav Barton

## 2018-08-28 NOTE — PLAN OF CARE
Problem: Falls - Risk of:  Goal: Will remain free from falls  Will remain free from falls   Outcome: Ongoing  D: patient remains a fall risk r/t altered mental status   A: fall precautions in place including bed alarm , call light within reach , and hourly rounding continued   R: no s/s of injury noted

## 2018-08-28 NOTE — PROGRESS NOTES
Nephrology Progress Note     Cr better   Good UO   Na stable     Past Medical History:   Diagnosis Date    CAD (coronary artery disease) 2012    minor, per cardiac cath    Diabetes mellitus ([address]     Diabetic infection of left foot ([address] 2013    w/osteomyelitis    H/O cocaine abuse 6/25/2015    History of CVA with residual deficit 6/8/2012    Hypertension     MRSA (methicillin resistant staph aureus) culture positive 05/12/2017; 4/17/18; 4/19/18    toe; R foot; bone     Non-healing wound of lower extremity 07/2016    L great toe    Non-ischemic cardiomyopathy ([address] 2012    per cardiac cath    PAD [facility]peripheral artery disease) [facility] 07/26/2016    L PIPER - 0.6, unable to obtain readings on R    Peripheral neuropathy     Seizure, convulsion ([address] 07/2016    Dr. Mary Leung - due to scar from old L frontal infarction    Unspecified cerebral artery occlusion with cerebral infarction 6/8/2012       Past Surgical History:   Procedure Laterality Date    CARDIAC CATHETERIZATION  01/31/2012    Dr. Dallas Argueta Left 10/19/2017     INCISION AND DRAINAGE WITH AMPUTATION OF 2ND DIGIT LEFT FOOT    FOOT SURGERY Right 04/19/2018    RIGHTFOOT DEBRIDEMENT INCISION AND DRAINAGE WITH 5TH RAY RESECTION    FOOT SURGERY Right 04/25/2018    RIGHT TIBIALIS TENDON TRANSFER, REPEAT INCISION AND DRAINAGE    FOOT TENDON SURGERY Left 05/24/2013    , DPM - I&D to antebellum cortex w/wound closure, perineal tendon transfer    KNEE ARTHROSCOPY      OTHER SURGICAL HISTORY Left 05/17/2017    INCISION AND DRAINAGE, AMPUTATION OF HALLUX LEFT FOOT    TOE AMPUTATION Left 05/21/2013    Dr. Kirk Gan, DPM - I&D to bone cortex w/amputation of 5th metatarsal       Family History   Problem Relation Age of Onset    Kidney Disease Father     Kidney Disease Brother         reports that he quit smoking about 25 years ago. His smoking use included Cigarettes.  He has never used smokeless

## 2018-08-28 NOTE — PLAN OF CARE
Problem: Skin Integrity:  Goal: Will show no infection signs and symptoms  Will show no infection signs and symptoms   Outcome: Ongoing  D: wbc elevated 24.4, temp afebrile, drsg dry and intact over left foot   A: continue to monitor for s/s infection

## 2018-08-28 NOTE — PROGRESS NOTES
Nephrology Progress Note     Cr stable   Good UO   Na stable     Past Medical History:   Diagnosis Date    CAD (coronary artery disease) 2012    minor, per cardiac cath    Diabetes mellitus ([address]     Diabetic infection of left foot ([address] 2013    w/osteomyelitis    H/O cocaine abuse 6/25/2015    History of CVA with residual deficit 6/8/2012    Hypertension     MRSA (methicillin resistant staph aureus) culture positive 05/12/2017; 4/17/18; 4/19/18    toe; R foot; bone     Non-healing wound of lower extremity 07/2016    L great toe    Non-ischemic cardiomyopathy ([address] 2012    per cardiac cath    PAD (peripheral artery disease) ([facility] 07/26/2016    L PIPER - 0.6, unable to obtain readings on R    Peripheral neuropathy     Seizure, convulsion ([address] 07/2016    Dr. Kerline chan. Xochitl - due to scar from old L frontal infarction    Unspecified cerebral artery occlusion with cerebral infarction 6/8/2012       Past Surgical History:   Procedure Laterality Date    CARDIAC CATHETERIZATION  01/31/2012    Dr. Mark Allen Left 10/19/2017     INCISION AND DRAINAGE WITH AMPUTATION OF 2ND DIGIT LEFT FOOT    FOOT SURGERY Right 04/19/2018    RIGHTFOOT DEBRIDEMENT INCISION AND DRAINAGE WITH 5TH RAY RESECTION    FOOT SURGERY Right 04/25/2018    RIGHT TIBIALIS TENDON TRANSFER, REPEAT INCISION AND DRAINAGE    FOOT TENDON SURGERY Left 05/24/2013    , DPM - I&D to antebellum cortex w/wound closure, perineal tendon transfer    KNEE ARTHROSCOPY      OTHER SURGICAL HISTORY Left 05/17/2017    INCISION AND DRAINAGE, AMPUTATION OF HALLUX LEFT FOOT    TOE AMPUTATION Left 05/21/2013    Dr. Charles Peter, DPM - I&D to bone cortex w/amputation of 5th metatarsal       Family History   Problem Relation Age of Onset    Kidney Disease Father     Kidney Disease Brother         reports that he quit smoking about 25 years ago. His smoking use included Cigarettes.  He has never used smokeless [facility] [address]  Office : [phone]  Fax :[phone]

## 2018-08-29 NOTE — PROGRESS NOTES
ID Follow-up NOTE    CC:   L leg cellulitis  Antibiotics: Ceftriaxone    Admit Date: 8/24/2018  Hospital Day: 6    Subjective:     Pt c/o L leg pain with palpation  He feels he is neurologically at baseline    Objective:     Patient Vitals for the past 8 hrs:   BP Pulse Resp   08/29/18 1400 139/70 83 23   08/29/18 1300 (!) 133/59 77 30   08/29/18 1200 (!) 123/59 74 25   08/29/18 1100 (!) 125/59 76 24   08/29/18 0800 (!) 137/58 73 23     I/O last 3 completed shifts: In: 2435 [P.O.:920; I.V.:1115; IV Piggyback:400]  Out: 1310 [Urine:1310]  I/O this shift:  In: 240 [P.O.:240]  Out: 600 [Urine:600]    EXAM:  GENERAL: No apparent distress.     HEENT: Membranes moist, no oral lesion  NECK:  Supple  LUNGS: Clear b/l, no rales, no dullness  CARDIAC: RRR, no murmur appreciated  ABD:  + BS, soft / NT  EXT:  L LE red, warm, less swollen, superficial skin coming off - covered with xeroform, gauze, kerlix and ACE  NEURO: No focal neurologic findings  PSYCH: Orientation, sensorium, mood normal  LINES:  Peripheral iv       Data Review:  Lab Results   Component Value Date    WBC 27.3 (H) 08/29/2018    HGB 10.7 (L) 08/29/2018    HCT 32.7 (L) 08/29/2018    MCV 82.4 08/29/2018     08/29/2018     Lab Results   Component Value Date    CREATININE 1.2 08/29/2018    BUN 13 08/29/2018     (L) 08/29/2018    K 3.3 (L) 08/29/2018    CL 96 (L) 08/29/2018    CO2 26 08/29/2018       Hepatic Function Panel:   Lab Results   Component Value Date    ALKPHOS 37 05/31/2018    ALT 20 05/31/2018    AST 16 05/31/2018    PROT 6.1 10/18/2017    PROT 7.7 06/08/2012    BILITOT 0.4 05/31/2018    BILIDIR <0.2 10/18/2017    IBILI see below 10/18/2017    LABALBU 2.2 08/29/2018       Micro:  4/17 Wound: GS 1+WBC, 1+GPC; culture - light mixed skin carrol, light MRSA   [facility] MRSA   Antibiotic Interpretation [facility] Unit   ceFAZolin Resistant 16 mcg/mL   ciprofloxacin Resistant >2 mcg/mL   clindamycin Sensitive <=0.5 mcg/mL   erythromycin Resistant >4

## 2018-08-29 NOTE — PROGRESS NOTES
intracranial findings. XR CHEST STANDARD (2 VW)   Final Result      Bibasilar atelectasis. No focal consolidation. CTA HEAD W CONTRAST   Final Result      No evidence of flow-limiting stenosis. 0% stenosis of bilateral proximal ICAs by NASCET CRITERIA. PROCEDURE: CT ANGIOGRAPHY HEAD WITH/WITHOUT CONTRAST      INDICATION: STROKE,       COMPARISON: none      TECHNIQUE: Axial CT imaging obtained through the head prior to and following administration of IV contrast. Axial images, multiplanar reformatted images, and maximum intensity projection images were reviewed for CT angiographic technique. IV contrast: mL Omnipaque 350. FINDINGS:      ANTERIOR CIRCULATION: The intracranial internal carotid arteries, anterior cerebral arteries, and middle cerebral arteries demonstrate no occlusion or significant stenosis. Atherosclerotic calcification with mild narrowing of bilateral carotid cavernous    segments. No evidence for aneurysm or arteriovenous malformation. POSTERIOR CIRCULATION: The bilateral vertebral arteries, basilar artery and posterior cerebral arteries demonstrate no occlusion or stenosis. Basilar artery is small in caliber with bilateral fetal-type PCAs. No evidence for aneurysm or arteriovenous    malformation. INTRACRANIAL VENOUS SYSTEM: No evidence for intracranial venous thrombosis. IMPRESSION:      No evidence of significant stenosis or occlusion of major intracranial arteries. CTA NECK W CONTRAST   Final Result      No evidence of flow-limiting stenosis. 0% stenosis of bilateral proximal ICAs by NASCET CRITERIA.             PROCEDURE: CT ANGIOGRAPHY HEAD WITH/WITHOUT CONTRAST      INDICATION: STROKE,       COMPARISON: none      TECHNIQUE: Axial CT imaging obtained through the head prior to and following administration of IV contrast. Axial images, multiplanar reformatted images, and maximum intensity projection images were reviewed for CT

## 2018-08-30 NOTE — PROGRESS NOTES
ID Follow-up NOTE    CC:   L leg cellulitis  Antibiotics: Ceftriaxone    Admit Date: 8/24/2018  Hospital Day: 7    Subjective:     Pt c/o L leg pain with palpation  He feels he is neurologically at baseline    Objective:     T 100.2 this am    Patient Vitals for the past 8 hrs:   BP Temp Temp src Pulse Resp SpO2   08/30/18 0941 103/79 100.2 °F (37.9 °C) Oral 88 22 98 %   08/30/18 0853 - - - - - 94 %   08/30/18 0530 (!) 167/83 99.3 °F (37.4 °C) Oral 77 16 98 %     I/O last 3 completed shifts: In: 0 [P.O.:540; IV Piggyback:50]  Out: 2625 [Urine:2625]  No intake/output data recorded. EXAM:  GENERAL: No apparent distress.     HEENT: Membranes moist, no oral lesion  NECK:  Supple  LUNGS: Clear b/l, no rales, no dullness  CARDIAC: RRR, no murmur appreciated  ABD:  + BS, soft / NT  EXT:  L LE with dressing [place] [change dressing 8/29 - red, warm, less swelling, superficial skin coming off]  NEURO: No focal neurologic findings  PSYCH: Orientation, sensorium, mood normal  LINES:  Peripheral iv       Data Review:  Lab Results   Component Value Date    WBC 27.7 (H) 08/30/2018    HGB 11.0 (L) 08/30/2018    HCT 33.7 (L) 08/30/2018    MCV 83.3 08/30/2018     08/30/2018     Lab Results   Component Value Date    CREATININE 1.1 08/30/2018    BUN 13 08/30/2018     (L) 08/30/2018    K 3.5 08/30/2018    CL 97 (L) 08/30/2018    CO2 23 08/30/2018       Hepatic Function Panel:   Lab Results   Component Value Date    ALKPHOS 37 05/31/2018    ALT 20 05/31/2018    AST 16 05/31/2018    PROT 6.1 10/18/2017    PROT 7.7 06/08/2012    BILITOT 0.4 05/31/2018    BILIDIR <0.2 10/18/2017    IBILI see below 10/18/2017    LABALBU 2.3 08/30/2018       Micro:  4/17 Wound: GS 1+WBC, 1+GPC; culture - light mixed skin carrol, light MRSA   [facility] MRSA   Antibiotic Interpretation [facility] Unit   ceFAZolin Resistant 16 mcg/mL   ciprofloxacin Resistant >2 mcg/mL   clindamycin Sensitive <=0.5 mcg/mL   erythromycin Resistant >4 mcg/mL   oxacillin

## 2018-08-30 NOTE — PROGRESS NOTES
Independent  Ambulation Assistance: Independent (via wheeled walker)  Transfer Assistance: Independent  Active : No  Additional Comments: Denies falls. Reports girlfriend comes in and out. Objective   Vision:  (wears glasses )  Hearing: Within functional limits    Orientation  Overall Orientation Status: Impaired  Orientation Level:  (to oriented name only)     Balance  Sitting Balance: Stand by assistance  Standing Balance: Unable to assess(comment)  Standing Balance  Sit to stand: Dependent/Total (attempted sit to stand from bed w/o success)  ADL  LE Dressing: Dependent/Total  Toileting: Dependent/Total (catheter)  Tone RUE  RUE Tone: Normotonic  Tone LUE  LUE Tone: Normotonic     Bed mobility  Supine to Sit: Maximum assistance  Sit to Supine: Moderate assistance  Scooting: Dependent/Total  Transfers  Stand Pivot Transfers: Unable to assess  Sit to stand: Dependent/Total (attempted sit to stand from bed w/o success)     Cognition  Overall Cognitive Status: Exceptions  Arousal/Alertness: Delayed responses to stimuli  Following Commands: Inconsistently follows commands  Initiation: Requires cues for some                 LUE AROM (degrees)  LUE AROM : WFL  RUE AROM (degrees)  RUE AROM : WFL  LUE Strength  Gross LUE Strength: WFL  RUE Strength  Gross RUE Strength: WFL     Hand Dominance  Hand Dominance: Right     Treatment included ADL and transfer training. Assessment   Performance deficits / Impairments: Decreased functional mobility ; Decreased ADL status; Decreased endurance;Decreased cognition;Decreased strength  Assessment: Pt presents with a decline in functinal independence. Pt lives alone. Pt is currently requiring assist of 2 for mobility and increased assist for ADL. Feel pt would benefit from further OT services.   Treatment Diagnosis: Impaired functional mobility, functional activity tolerance, and ADL  Prognosis: Good  Decision Making: Medium Complexity  Patient Education: Role of OT: Pr verbalized understanding  REQUIRES OT FOLLOW UP: Yes  Activity Tolerance  Activity Tolerance: Patient limited by fatigue  Safety Devices  Safety Devices in place: Yes  Type of devices: Left in bed;Call light within reach;Nurse notified (nurse attending to pt)         Plan   Plan  Times per week: 2-5  Times per day: Daily  Current Treatment Recommendations: Functional Mobility Training, Endurance Training, Self-Care / ADL     If patient is discharged prior to next treatment, this note will serve as the discharge. G-Code  OT G-codes  Functional Limitation: Self care  Self Care Current Status (): At least 60 percent but less than 80 percent impaired, limited or restricted  Self Care Goal Status (): At least 40 percent but less than 60 percent impaired, limited or restricted    AM-PAC Score        AM-[facility] Inpatient Daily Activity Raw Score: 11  AM-PAC Inpatient ADL T-Scale Score : 29.04  ADL Inpatient CMS 0-100% Score: 70.42  ADL Inpatient CMS G-Code Modifier : CL    Goals:              No goals met  Short term goals  Time Frame for Short term goals: Discharge  Short term goal 1: Transfer to/from 3 in 1 commode with max assist  Short term goal 2: Perform lower body dressing with mod assist  Short term goal 3: Stance with outside support and mod asist x2 min to assist with ADL  Patient Goals   Patient goals :  \"Fix my place up\"         Therapy Time   Individual Concurrent Group Co-treatment   Time In 0836         Time Out 0930         Minutes 54         Timed Code Treatment Minutes: 9466 Андрей Langford OTR/L [id number]

## 2018-08-30 NOTE — PROGRESS NOTES
Subjective  General  Chart Reviewed: Yes  Additional Pertinent Hx: Pt is a 70 y.o. male adm 8/24 with cellulitis. Came to ED with concern for stroke - aphasic, slurring, leaning to L. Head CT: neg. CTA head/neck: neg. CXR: bibasilar atelectasis. MRI neg acute, remote L frontoparietal and cerebellar infarcts. LE dopplers: neg RLE, pt refused LLE. PMH:  CAD, DM, h/o cocaine abuse, CVA, HTN, PAD, seizure, bilat foot surgeries  Referring Practitioner: Kristian Roberts MD  Referral Date : 08/29/18  Diagnosis: cellulitis  Subjective  Subjective: Pt found in supine. Speech slow. Perseverates at times. Reports speech has \"always been like this\"  but admits having trouble with what he wants to say which is new. Pain Screening  Patient Currently in Pain: Yes (LLE, 8/10, RN informed)  Vital Signs  Patient Currently in Pain: Yes (LLE, 8/10, RN informed)       Orientation  Orientation  Overall Orientation Status: Impaired  Orientation Level: Oriented to person;Oriented to place; Disoriented to time    Social/Functional History  Social/Functional History  Lives With: Alone  Type of Home: Apartment (5th floor)  Home Layout: One level  Home Access: Elevator, Ramped entrance  Home Equipment: Rolling walker  ADL Assistance: Independent  Homemaking Assistance: Independent  Ambulation Assistance: Independent (via wheeled walker)  Transfer Assistance: Independent  Active : No  Additional Comments: Denies falls. Reports girlfriend comes in and out. Objective          AROM RLE (degrees)  RLE AROM: WFL  AROM LLE (degrees)  LLE AROM :  (decreased grossly compared to RLE, [facility])  Strength RLE  Strength RLE:  (>3/5 grossly)  Strength LLE  Strength LLE:  (hip flex 1/5, knee flex/ext >3/5, DF 1/5)        Bed mobility  Supine to Sit: Moderate assistance (to max assist, HOB elevated )  Scooting:  Moderate assistance  Transfers  Sit to Stand: Dependent/Total (mod assist x 2, cues for hand placement)  Stand to sit: Dependent/Total (mod to max assist x 2, cues for safety backing up and for hand placement)  Bed to Chair: Dependent/Total (mod assist x 2, stand pivot with rolling walker)  Ambulation  Ambulation?: Yes  Ambulation 1  Device: Rolling Walker  Assistance: Dependent/Total (mod assist x 2)  Quality of Gait: post lean, cues for upright posture, decreased bilat step lenght/height especially LLE, difficulty advancing LEs - cues for sequencing  Distance: 3-4 steps     Balance  Standing - Static:  (mod assist x 2 with walker, post lean, cues for upright posture)      Treatment included: bed mobility, transfers, gt, pt education    Assessment   Body structures, Functions, Activity limitations: Decreased functional mobility ; Decreased strength;Decreased safe awareness;Decreased cognition;Decreased endurance;Decreased balance;Decreased fine motor control;Decreased coordination  Assessment: Pt with decreased independence with mobility and below stated baseline. Pt reports normally lives alone and is independent with ambulation with walker. Currently needing mod assist x 2 for transfers and to walk a few steps with walker. Pt at risk for falls and not safe to return home alone. Rec cont skilled PT to maximize mobility and independence  Treatment Diagnosis: impaired functional mobility 2/2 decreased strength and balance  Decision Making: Medium Complexity  Patient Education: Educated pt on role of PT, use of call light, importance of OOB. Pt verbalized understanding.   Needs encouragement to participate  REQUIRES PT FOLLOW UP: Yes         Plan   Plan  Times per week: 2-5  Current Treatment Recommendations: Strengthening, Balance Training, Functional Mobility Training, Endurance Training, Gait Training, Patient/Caregiver Education & Training, Safety Education & Training  Safety Devices  Type of devices: Call light within reach, Chair alarm in place, Nurse notified, Left in chair    G-Code  PT G-Codes  Functional Limitation:

## 2018-08-30 NOTE — PROGRESS NOTES
Internal Medicine PGY-1 Resident Progress Note        PCP: Deanna Boothe    Date of Admission: 8/24/2018    Subjective: Patient spiked a fever of 101.4 overnight. Patient is less responsive to questions and appears more lethargic on exam. LLE cellulitis is improving. WBC is still trending upwards. Patient denies any chills, n/v or SOB. Medications:  Reviewed    Infusion Medications    sodium chloride      dextrose       Scheduled Medications    potassium chloride  40 mEq Intravenous Once    magnesium sulfate  1 g Intravenous Q1H    insulin glargine  25 Units Subcutaneous Nightly    levetiracetam  750 mg Intravenous Q12H    atorvastatin  40 mg Oral Nightly    fluticasone  1 spray Each Nare Daily    tamsulosin  0.4 mg Oral Daily    cefTRIAXone (ROCEPHIN) IV  2 g Intravenous Q24H    aspirin  81 mg Oral Daily    busPIRone  10 mg Oral Daily    finasteride  5 mg Oral Daily    polyethylene glycol  17 g Oral Daily    sodium chloride flush  10 mL Intravenous 2 times per day    insulin lispro  0-12 Units Subcutaneous TID WC    insulin lispro  0-6 Units Subcutaneous Nightly    heparin (porcine)  5,000 Units Subcutaneous 3 times per day     PRN Meds: acetaminophen, sodium chloride flush, magnesium hydroxide, ondansetron, glucose, dextrose, glucagon (rDNA), dextrose      Intake/Output Summary (Last 24 hours) at 08/30/18 1359  Last data filed at 08/30/18 0540   Gross per 24 hour   Intake              470 ml   Output             2275 ml   Net            -1805 ml       Physical Exam Performed:    /72   Pulse 83   Temp 99.6 °F (37.6 °C) (Tympanic)   Resp 22   Ht 5' 11\" (1.803 m)   Wt 198 lb 3.1 oz (89.9 kg)   SpO2 95%   BMI 27.64 kg/m²     Physical Exam   Constitutional: He is oriented to person, place, and time. No distress. lethargic   HENT:   Head: Normocephalic and atraumatic. Cardiovascular: Normal rate and normal heart sounds.     Pulmonary/Chest: Effort normal. No respiratory distress. Abdominal: Soft. There is no tenderness. Musculoskeletal: He exhibits tenderness and deformity. He exhibits no edema. Neurological: He is alert and oriented to person, place, and time. A cranial nerve deficit is present. Skin: Skin is warm and dry. He is not diaphoretic. Labs:   Recent Labs      08/28/18   0425  08/29/18   0500  08/30/18   0613   WBC  24.4*  27.3*  27.7*   HGB  11.3*  10.7*  11.0*   HCT  34.2*  32.7*  33.7*   PLT  203  298  364     Recent Labs      08/28/18   0425  08/29/18   0500  08/30/18   0614   NA  131*  132*  132*   K  3.5  3.3*  3.5   CL  96*  96*  97*   CO2  25  26  23   BUN  15  13  13   CREATININE  1.4*  1.2  1.1   CALCIUM  8.1*  8.0*  8.1*   PHOS  2.2*  2.3*  2.5     No results for input(s): AST, ALT, BILIDIR, BILITOT, ALKPHOS in the last 72 hours. No results for input(s): INR in the last 72 hours. Recent Labs      08/28/18 0425   CKTOTAL  642*       Urinalysis:    Lab Results   Component Value Date    NITRU Negative 08/26/2018    WBCUA 0-2 08/26/2018    BACTERIA 1+ 08/26/2018    RBCUA 0-2 08/26/2018    BLOODU MODERATE 08/26/2018    SPECGRAV 1.020 08/26/2018    GLUCOSEU Negative 08/26/2018    GLUCOSEU Negative 05/01/2012       Radiology:  XR CHEST PORTABLE   Final Result      1. Mild atelectasis in the lung bases. VL Extremity Venous Bilateral   Final Result      CT HEAD WO CONTRAST   Final Result      Atrophy and white matter ischemic changes with evidence of prior infarcts. No acute hemorrhage or mass effect seen. XR CHEST PORTABLE   Final Result      Unchanged relative elevation of the right diaphragm. No acute cardiopulmonary findings. MRI BRAIN WO CONTRAST   Final Result      Evidence of diffuse chronic small vessel white matter disease bilaterally. Remote left frontoparietal cortical and cerebellar infarcts. No acute intracranial findings.       XR CHEST STANDARD (2 VW)   Final Result

## 2018-08-30 NOTE — PROGRESS NOTES
Patient arrived to unit from ICU. Oriented to self only. Vitals stable. Four eyes completed with ICU RN. Will continue to monitor.

## 2018-08-31 NOTE — PROGRESS NOTES
tobacco. He reports that he drinks alcohol. He reports that he does not use drugs. Allergies:  Patient has no known allergies.     Current Medications:      insulin glargine (LANTUS) injection pen 25 Units Nightly   levETIRAcetam (KEPPRA) 750 mg in sodium chloride 0.9 % 100 mL IVPB Q12H   atorvastatin (LIPITOR) tablet 40 mg Nightly   fluticasone (FLONASE) 50 MCG/ACT nasal spray 1 spray Daily   tamsulosin (FLOMAX) capsule 0.4 mg Daily   cefTRIAXone (ROCEPHIN) 2 g IVPB in D5W 50ml minibag Q24H   acetaminophen (TYLENOL) tablet 650 mg Q4H PRN   aspirin chewable tablet 81 mg Daily   busPIRone (BUSPAR) tablet 10 mg Daily   finasteride (PROSCAR) tablet 5 mg Daily   polyethylene glycol (GLYCOLAX) packet 17 g Daily   sodium chloride flush 0.9 % injection 10 mL 2 times per day   sodium chloride flush 0.9 % injection 10 mL PRN   magnesium hydroxide (MILK OF MAGNESIA) 400 MG/5ML suspension 30 mL Daily PRN   ondansetron (ZOFRAN) injection 4 mg Q6H PRN   glucose (GLUTOSE) 40 % oral gel 15 g PRN   dextrose 50 % solution 12.5 g PRN   glucagon (rDNA) injection 1 mg PRN   dextrose 5 % solution PRN   insulin lispro (HUMALOG) injection pen 0-12 Units TID WC   insulin lispro (HUMALOG) injection pen 0-6 Units Nightly   heparin (porcine) injection 5,000 Units 3 times per day         Physical exam:     Vitals:  BP (!) 149/75   Pulse 74   Temp 98.2 °F (36.8 °C) (Oral)   Resp 16   Ht 5' 11\" (1.803 m)   Wt 198 lb 3.1 oz (89.9 kg)   SpO2 97%   BMI 27.64 kg/m²   Constitutional:  AA   Skin: no rash, turgor wnl  Heent:  eomi, mmm  Neck: no bruits or jvd noted  Cardiovascular:  S1, S2 without m/r/g  Respiratory: CTA B without w/r/r  Abdomen:  +bs, soft, nt, nd  Ext: no lower extremity edema  Psychiatric: mood and affect flat   Musculoskeletal:  Rom, muscular strength dec     Data:   Labs:  CBC:   Recent Labs      08/29/18   0500  08/30/18   0613  08/31/18   0459   WBC  27.3*  27.7*  21.6*   HGB  10.7*  11.0*  9.5*   PLT  298  654 693 cortical and cerebellar infarcts. No acute intracranial findings. XR CHEST STANDARD (2 VW)   Final Result      Bibasilar atelectasis. No focal consolidation. CTA HEAD W CONTRAST   Final Result      No evidence of flow-limiting stenosis. 0% stenosis of bilateral proximal ICAs by NASCET CRITERIA. PROCEDURE: CT ANGIOGRAPHY HEAD WITH/WITHOUT CONTRAST      INDICATION: STROKE,       COMPARISON: none      TECHNIQUE: Axial CT imaging obtained through the head prior to and following administration of IV contrast. Axial images, multiplanar reformatted images, and maximum intensity projection images were reviewed for CT angiographic technique. IV contrast: mL Omnipaque 350. FINDINGS:      ANTERIOR CIRCULATION: The intracranial internal carotid arteries, anterior cerebral arteries, and middle cerebral arteries demonstrate no occlusion or significant stenosis. Atherosclerotic calcification with mild narrowing of bilateral carotid cavernous    segments. No evidence for aneurysm or arteriovenous malformation. POSTERIOR CIRCULATION: The bilateral vertebral arteries, basilar artery and posterior cerebral arteries demonstrate no occlusion or stenosis. Basilar artery is small in caliber with bilateral fetal-type PCAs. No evidence for aneurysm or arteriovenous    malformation. INTRACRANIAL VENOUS SYSTEM: No evidence for intracranial venous thrombosis. IMPRESSION:      No evidence of significant stenosis or occlusion of major intracranial arteries. CTA NECK W CONTRAST   Final Result      No evidence of flow-limiting stenosis. 0% stenosis of bilateral proximal ICAs by NASCET CRITERIA.             PROCEDURE: CT ANGIOGRAPHY HEAD WITH/WITHOUT CONTRAST      INDICATION: STROKE,       COMPARISON: none      TECHNIQUE: Axial CT imaging obtained through the head prior to and following administration of IV contrast. Axial images, multiplanar reformatted images, and maximum

## 2018-08-31 NOTE — PROGRESS NOTES
Physical Therapy  Daily Treatment Note    Discharge Recommendations: Bruce Rodriguez scored a 13/24 on the AM-PAC short mobility form. Current research shows that an AM-PAC score of 17 or less is typically not associated with a discharge to the patient's home setting. Based on the patients AM-PAC score and their current functional mobility deficits, it is recommended that the patient have 3-5 sessions per week of Physical Therapy at d/c to increase the patients independence. Equipment Needs: No new needs anticipated (Pt reports having walker, wheelchair at home.)    Chart Reviewed: Yes     Other position/activity restrictions: Up with assist.  Seizure precautions. Additional Pertinent Hx: Pt is a 70 y.o. male adm 8/24 with cellulitis. Came to ED with concern for stroke - aphasic, slurring, leaning to L. Head CT: neg. CTA head/neck: neg. CXR: bibasilar atelectasis. MRI neg acute, remote L frontoparietal and cerebellar infarcts. LE dopplers: neg RLE, pt refused LLE. PMH:  CAD, DM, h/o cocaine abuse, CVA, HTN, PAD, seizure, bilat foot surgeries      Diagnosis: cellulitis   Treatment Diagnosis: impaired functional mobility 2/2 decreased strength and balance    Subjective: Pt in bed initially. Agreeable to getting [facility] chair. After getting up, pt stating \"I'm exhausted. \"    Pain: some c/o vague discomfort--tolerable    Objective:    Bed mobility  Supine to sit: SBA with HOB up and use of rail. Very slow. Scooting SBA to EOB    Transfers  Sit to stand: Dependent (Min assist x 2) from raised height bed  Stand to sit: Dependent (Min assist x 2) into chair    Ambulation  Assistance Level: Dependent (Min assist x 2)  Assistive device: Wheeled walker  Distance: 2 ft (bed to chair)--pt declined further ambulation. Quality of gait: Very slow, weak. Decreased step length. Effortful. Relying on walker for support. Exercises  Pt declined.      Balance  Sat EOB statically x 7 minutes with SBA     Patient Education  Role of PT. Importance of mobility. Expressed understanding. Safety Devices  Pt left with needs in reach. In chair (reclined) with chair alarm on. RN updated. Assessment:  Pt moves very slowly. Needing less assist for bed mobility and transfers this session. Fatigued after getting up to chair. Pt continues to be below baseline for mobility and would benefit from skilled PT to maximize independence prior to D/C home. AM-PAC score  AM-PAC Inpatient Mobility Raw Score : 13  AM-PAC Inpatient T-Scale Score : 36.74  Mobility Inpatient CMS 0-100% Score: 64.91  Mobility Inpatient CMS G-Code Modifier : CL    Goals: (as determined and assessed by primary PT)  Time Frame for Short term goals: By discharge  Short term goal 1: Sup to sit SBA   Short term goal 2: Pt will transfer sit to stand CGA   Short term goal 3: Pt will amb >50' with AD CGA      Plan:  Times per week: 2-5;    Current Treatment Recommendations: Strengthening, Balance Training, Functional Mobility Training, Endurance Training, Gait Training, Patient/Caregiver Education & Training, Safety Education & Training    Therapy Time    Individual  Concurrent  Group  Co-treatment    Time In  1145            Time Out  1217            Minutes  32              Timed Code Treatment Minutes: 32  Total Treatment Minutes: 32    Will continue per plan of care. If patient is discharged prior to next treatment, this note will serve as the discharge summary. [place], [place] #[id number]    Met goal 1. Cont to work toward above goals.   Marlana Hodgkin, [address] [place] [address]

## 2018-08-31 NOTE — PROGRESS NOTES
cerebral arteries, and middle cerebral arteries demonstrate no occlusion or significant stenosis. Atherosclerotic calcification with mild narrowing of bilateral carotid cavernous    segments. No evidence for aneurysm or arteriovenous malformation. POSTERIOR CIRCULATION: The bilateral vertebral arteries, basilar artery and posterior cerebral arteries demonstrate no occlusion or stenosis. Basilar artery is small in caliber with bilateral fetal-type PCAs. No evidence for aneurysm or arteriovenous    malformation. INTRACRANIAL VENOUS SYSTEM: No evidence for intracranial venous thrombosis. IMPRESSION:      No evidence of significant stenosis or occlusion of major intracranial arteries. CTA NECK W CONTRAST   Final Result      No evidence of flow-limiting stenosis. 0% stenosis of bilateral proximal ICAs by NASCET CRITERIA. PROCEDURE: CT ANGIOGRAPHY HEAD WITH/WITHOUT CONTRAST      INDICATION: STROKE,       COMPARISON: none      TECHNIQUE: Axial CT imaging obtained through the head prior to and following administration of IV contrast. Axial images, multiplanar reformatted images, and maximum intensity projection images were reviewed for CT angiographic technique. IV contrast: mL Omnipaque 350. FINDINGS:      ANTERIOR CIRCULATION: The intracranial internal carotid arteries, anterior cerebral arteries, and middle cerebral arteries demonstrate no occlusion or significant stenosis. Atherosclerotic calcification with mild narrowing of bilateral carotid cavernous    segments. No evidence for aneurysm or arteriovenous malformation. POSTERIOR CIRCULATION: The bilateral vertebral arteries, basilar artery and posterior cerebral arteries demonstrate no occlusion or stenosis. Basilar artery is small in caliber with bilateral fetal-type PCAs. No evidence for aneurysm or arteriovenous    malformation. INTRACRANIAL VENOUS SYSTEM: No evidence for intracranial venous thrombosis.

## 2018-08-31 NOTE — PROGRESS NOTES
5th MT base   - MRI with R 5th MT OM with ST cellulitis, remaining 4th MT signal  - Wound + MRSA. OR 4/19 - 5th ray resection - soft bone.  Path with chronic osteomyelitis  Return to OR 4/25 - tendon transfer, no purulence or sign active infection  Discharged on 4/26 on daptomycin to 6/16,       Presents 8/23 with worsening neurologic sx including worse slurring of words, word finding. Admit 8/23 - afebrile, CT with new R periventicular white matter infarct. Seen by Neuro, recommended MRI, EEG, restarteding keppra. Started on vancomycin, cefepime, flagyl     Today, Tm 102. 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  Path with chronic osteomyelitis  Return to OR 4/25 - tendon transfer, no purulence or sign active infection.   Rx daptomycin     # new Neurologic event - speech at baseline  # L LE cellulitis - likely Streptococcal   # No R foot infection      Plan:     Cont ceftriaxone  Wound care - xeroform, gauze, kerlix and ACE    At discharge po clinda 300 tid x 7 days     Discussed with pt  Obi Hicks MD

## 2018-08-31 NOTE — PROGRESS NOTES
generalized weakness and fatigue. MRI brain= Evidence of diffuse chronic small vessel white matter disease bilaterally. Remote left frontoparietal cortical and cerebellar infarcts. No acute intracranial findings. Head CT (-) acute. Rt LE doppler (-)      PMH includes: CAD, DM, osteomyelitis, cocaine abuse, HTN, MRSA, PAD, peripheral neuropathy, seizure, CVA, Rt foot metatarsal amp, Left toe amp, knee arthroscopy  Family / Caregiver Present: No  Referring Practitioner: Dr. Sharmila Prieto  Diagnosis: Cellulitis  Subjective  Subjective: Pt in bed upon entry, agreeable to treatment and transfer to chair. Pt's speech is slow and difficult to understand (related to prior stroke?)  Pain Assessment  Patient Currently in Pain: Denies    Orientation  Orientation  Overall Orientation Status:  (oriented to conversation, not formally assessed)  Objective    ADL  Additional Comments: pt declined all ADLs despite encouragement        Balance  Sitting Balance: Supervision  Standing Balance: Dependent/Total (min A of 2 with walker)    Functional Mobility  Functional - Mobility Device: Rolling Walker  Activity: Other (3' to chair)  Assist Level: Dependent/Total (min A of 2 using walker)  Functional Mobility Comments: after transfer to chair, pt declined additional mobility, transfers or exercises despite maximal encouragement    Bed mobility  Supine to Sit: Stand by assistance (HOB elevated, rail)  Scooting: Stand by assistance  Comment: required increased time for bed mobility and scooting     Transfers  Sit to stand: Dependent/Total (min A from elevated eob, cues for safe hand placement with walker)  Stand to sit: Moderate assistance (decreased control of descent to chair, cues for safe hand placement)        Cognition  Overall Cognitive Status: Exceptions  Arousal/Alertness: Delayed responses to stimuli  Following Commands:  Follows one step commands with increased time  Safety Judgement: Decreased awareness of need for assistance  Initiation: Requires cues for some           Assessment   Performance deficits / Impairments: Decreased functional mobility ; Decreased ADL status; Decreased endurance;Decreased cognition;Decreased strength  Assessment: Pt demonstrated improved transfers this date, but continues to require assistof 2 for transfers and unable to tolerate mobility aside from bed>chair due to pt fatigue and refusal for additional activity. Discussed with pt home safety concerns due to current level of assist required for all ADLs and mobility. Pt states he plans to return home tomorrow with friend assist 24hr. Pt would benefit from ongoing inpatient OT tx prior to return home  Treatment Diagnosis: Impaired functional mobility, functional activity tolerance, and ADL  Prognosis: Good  Patient Education: OT role, activity promotion, home safety concerns- pt verbalized understanding  REQUIRES OT FOLLOW UP: Yes  Activity Tolerance  Activity Tolerance: Patient limited by fatigue;Patient Tolerated treatment well  Safety Devices  Safety Devices in place: Yes (recommend assist of 2 using walker, gait belt for transfers- discussed with RN and whiteboard updated)  Type of devices: Call light within reach; Chair alarm in place; Left in chair;Nurse notified          Plan   Plan  Times per week: 2-5  Times per day: Daily  Current Treatment Recommendations: Functional Mobility Training, Endurance Training, Self-Care / ADL  G-Code     OutComes Score                                           AM-PAC Score        AM-[facility] Inpatient Daily Activity Raw Score: 14  AM-PAC Inpatient ADL T-Scale Score : 33.39  ADL Inpatient CMS 0-100% Score: 59.67  ADL Inpatient CMS G-Code Modifier : CK    Goals  Short term goals  Time Frame for Short term goals: Discharge  Short term goal 1: Transfer to/from 3 in 1 commode with max assist- goal not met, progressing  Short term goal 2: Perform lower body dressing with mod assist- not met  Short term goal 3: Stance with

## 2018-09-01 NOTE — PLAN OF CARE
Problem: Falls - Risk of:  Goal: Will remain free from falls  Will remain free from falls   Outcome: Ongoing  Pt remains free of falls this shift. Bed is locked and in the lowest position. Bed alarm is on, call light and bedside table are within reach. Will continue to monitor. Problem: Pain:  Goal: Pain level will decrease  Pain level will decrease   Outcome: Ongoing  Pt complains of pain, prn pain medication given. Will continue to assess for pain. Problem: Risk for Impaired Skin Integrity  Goal: Tissue integrity - skin and mucous membranes  Structural intactness and normal physiological function of skin and  mucous membranes. Outcome: Ongoing  Pt is at risk for skin breakdown. Pt is able to reposition self and was told to do so frequently. Pt is incontinent, checked and changed frequently. No new signs of skin breakdown noted at this time. Will continue to monitor.

## 2018-09-01 NOTE — PROGRESS NOTES
Pt is refusing to take oral keppra and tylenol for fever. Pt is also refusing SCD pumps. Medical resident notified. No new orders placed at this time.

## 2018-09-01 NOTE — PROGRESS NOTES
Nephrology Progress Note     Cr stable   Good UO   On abx     Past Medical History:   Diagnosis Date    CAD (coronary artery disease) 2012    minor, per cardiac cath    Diabetes mellitus ([address]     Diabetic infection of left foot ([address] 2013    w/osteomyelitis    H/O cocaine abuse 6/25/2015    History of CVA with residual deficit 6/8/2012    Hypertension     MRSA (methicillin resistant staph aureus) culture positive 05/12/2017; 4/17/18; 4/19/18    toe; R foot; bone     Non-healing wound of lower extremity 07/2016    L great toe    Non-ischemic cardiomyopathy ([address] 2012    per cardiac cath    PAD (peripheral artery disease) [address] [address] 07/26/2016    L PIPER - 0.6, unable to obtain readings on R    Peripheral neuropathy     Seizure, convulsion ([address] 07/2016    Dr. Keith Thompson. Xochitl - due to scar from old L frontal infarction    Unspecified cerebral artery occlusion with cerebral infarction 6/8/2012       Past Surgical History:   Procedure Laterality Date    CARDIAC CATHETERIZATION  01/31/2012    Dr. Nany Haider Left 10/19/2017     INCISION AND DRAINAGE WITH AMPUTATION OF 2ND DIGIT LEFT FOOT    FOOT SURGERY Right 04/19/2018    RIGHTFOOT DEBRIDEMENT INCISION AND DRAINAGE WITH 5TH RAY RESECTION    FOOT SURGERY Right 04/25/2018    RIGHT TIBIALIS TENDON TRANSFER, REPEAT INCISION AND DRAINAGE    FOOT TENDON SURGERY Left 05/24/2013    , DPM - I&D to antebellum cortex w/wound closure, perineal tendon transfer    KNEE ARTHROSCOPY      OTHER SURGICAL HISTORY Left 05/17/2017    INCISION AND DRAINAGE, AMPUTATION OF HALLUX LEFT FOOT    TOE AMPUTATION Left 05/21/2013    Dr. Sharia Boast, DPM - I&D to bone cortex w/amputation of 5th metatarsal       Family History   Problem Relation Age of Onset    Kidney Disease Father     Kidney Disease Brother         reports that he quit smoking about 25 years ago. His smoking use included Cigarettes.  He has never used smokeless

## 2018-09-02 NOTE — PLAN OF CARE
Problem: COMMUNICATION IMPAIRMENT  Goal: Ability to express needs and understand communication  Pt having difficulties expressing needs. Pt able to answer yes and no questions. Pt will repeat statements and has moderate to severe aphasia. Pt able to express when needing to be changed or needing to use the urinal.    Problem: Skin Integrity:  Goal: Will show no infection signs and symptoms  Will show no infection signs and symptoms   Pt skin is intact besides cellulitis on lower extremity. No redness noticed. Pt being cleaned, changed, and turned when soiled.

## 2018-09-02 NOTE — PROGRESS NOTES
Nephrology Progress Note     Cr stable   Good UO   On abx     Past Medical History:   Diagnosis Date    CAD (coronary artery disease) 2012    minor, per cardiac cath    Diabetes mellitus ([address]     Diabetic infection of left foot ([address] 2013    w/osteomyelitis    H/O cocaine abuse 6/25/2015    History of CVA with residual deficit 6/8/2012    Hypertension     MRSA (methicillin resistant staph aureus) culture positive 05/12/2017; 4/17/18; 4/19/18    toe; R foot; bone     Non-healing wound of lower extremity 07/2016    L great toe    Non-ischemic cardiomyopathy ([address] 2012    per cardiac cath    PAD (peripheral artery disease) [address] [address] 07/26/2016    L PIPER - 0.6, unable to obtain readings on R    Peripheral neuropathy     Seizure, convulsion ([address] 07/2016    Dr. Kerline chan. Xochitl - due to scar from old L frontal infarction    Unspecified cerebral artery occlusion with cerebral infarction 6/8/2012       Past Surgical History:   Procedure Laterality Date    CARDIAC CATHETERIZATION  01/31/2012    Dr. Mark Allen Left 10/19/2017     INCISION AND DRAINAGE WITH AMPUTATION OF 2ND DIGIT LEFT FOOT    FOOT SURGERY Right 04/19/2018    RIGHTFOOT DEBRIDEMENT INCISION AND DRAINAGE WITH 5TH RAY RESECTION    FOOT SURGERY Right 04/25/2018    RIGHT TIBIALIS TENDON TRANSFER, REPEAT INCISION AND DRAINAGE    FOOT TENDON SURGERY Left 05/24/2013    , DPM - I&D to antebellum cortex w/wound closure, perineal tendon transfer    KNEE ARTHROSCOPY      OTHER SURGICAL HISTORY Left 05/17/2017    INCISION AND DRAINAGE, AMPUTATION OF HALLUX LEFT FOOT    TOE AMPUTATION Left 05/21/2013    Dr. Charles Peter, DPM - I&D to bone cortex w/amputation of 5th metatarsal       Family History   Problem Relation Age of Onset    Kidney Disease Father     Kidney Disease Brother         reports that he quit smoking about 25 years ago. His smoking use included Cigarettes.  He has never used smokeless tobacco. He reports that he drinks alcohol. He reports that he does not use drugs. Allergies:  Patient has no known allergies.     Current Medications:      insulin glargine (LANTUS) injection pen 5 Units Nightly   levETIRAcetam (KEPPRA) tablet 750 mg BID   clindamycin (CLEOCIN) capsule 300 mg 3 times per day   atorvastatin (LIPITOR) tablet 40 mg Nightly   fluticasone (FLONASE) 50 MCG/ACT nasal spray 1 spray Daily   tamsulosin (FLOMAX) capsule 0.4 mg Daily   acetaminophen (TYLENOL) tablet 650 mg Q4H PRN   aspirin chewable tablet 81 mg Daily   busPIRone (BUSPAR) tablet 10 mg Daily   finasteride (PROSCAR) tablet 5 mg Daily   polyethylene glycol (GLYCOLAX) packet 17 g Daily   sodium chloride flush 0.9 % injection 10 mL 2 times per day   sodium chloride flush 0.9 % injection 10 mL PRN   magnesium hydroxide (MILK OF MAGNESIA) 400 MG/5ML suspension 30 mL Daily PRN   ondansetron (ZOFRAN) injection 4 mg Q6H PRN   glucose (GLUTOSE) 40 % oral gel 15 g PRN   dextrose 50 % solution 12.5 g PRN   glucagon (rDNA) injection 1 mg PRN   dextrose 5 % solution PRN   insulin lispro (HUMALOG) injection pen 0-12 Units TID WC   insulin lispro (HUMALOG) injection pen 0-6 Units Nightly   heparin (porcine) injection 5,000 Units 3 times per day         Physical exam:     Vitals:  BP (!) 150/77   Pulse 75   Temp 98.4 °F (36.9 °C) (Oral)   Resp 18   Ht 5' 11\" (1.803 m)   Wt 202 lb 13.2 oz (92 kg)   SpO2 94%   BMI 28.29 kg/m²   Constitutional:  AA   Skin: no rash, turgor wnl  Heent:  eomi, mmm  Neck: no bruits or jvd noted  Cardiovascular:  S1, S2 without m/r/g  Respiratory: CTA B without w/r/r  Abdomen:  +bs, soft, nt, nd  Ext: no lower extremity edema  Psychiatric: mood and affect flat   Musculoskeletal:  Rom, muscular strength dec     Data:   Labs:  CBC:   Recent Labs      08/31/18   0459   WBC  21.6*   HGB  9.5*   PLT  362     BMP:    Recent Labs      08/31/18   0459   NA  135*   K  3.4*   CL  102   CO2  25   BUN  12   CREATININE 1.1   GLUCOSE  61*     Ca/Mg/Phos:   Recent Labs      08/31/18   0459   CALCIUM  7.8*   PHOS  2.6     Hepatic: No results for input(s): AST, ALT, ALB, BILITOT, ALKPHOS in the last 72 hours. Troponin:   No results for input(s): TROPONINI in the last 72 hours. BNP:   No results for input(s): BNP in the last 72 hours. Lipids: No results for input(s): CHOL, TRIG, HDL, LDLCALC, LABVLDL in the last 72 hours. ABGs: No results for input(s): PHART, PO2ART, QMH3XBD in the last 72 hours. INR:   No results for input(s): INR in the last 72 hours. UA:  No results for input(s): Maria Teresa Plunk, GLUCOSEU, BILIRUBINUR, KETUA, SPECGRAV, BLOODU, PHUR, PROTEINU, UROBILINOGEN, NITRU, LEUKOCYTESUR, Leger Plan in the last 72 hours. Urine Microscopic:   No results for input(s): LABCAST, BACTERIA, COMU, HYALCAST, WBCUA, RBCUA, EPIU in the last 72 hours. Urine Culture:   No results for input(s): LABURIN in the last 72 hours. Urine Chemistry:   No results for input(s): Maurita Left, PROTEINUR, NAUR in the last 72 hours. IMAGING:  XR CHEST PORTABLE   Final Result      1. Mild atelectasis in the lung bases. VL Extremity Venous Bilateral   Final Result      CT HEAD WO CONTRAST   Final Result      Atrophy and white matter ischemic changes with evidence of prior infarcts. No acute hemorrhage or mass effect seen. XR CHEST PORTABLE   Final Result      Unchanged relative elevation of the right diaphragm. No acute cardiopulmonary findings. MRI BRAIN WO CONTRAST   Final Result      Evidence of diffuse chronic small vessel white matter disease bilaterally. Remote left frontoparietal cortical and cerebellar infarcts. No acute intracranial findings. XR CHEST STANDARD (2 VW)   Final Result      Bibasilar atelectasis. No focal consolidation. CTA HEAD W CONTRAST   Final Result      No evidence of flow-limiting stenosis.       0% stenosis of bilateral

## 2018-09-02 NOTE — PROGRESS NOTES
Pt disoriented to place, time and situation and oriented to self. No complaints of pain at this time. Vital signs stable, with a slightly elevated BP. BG in low 100s, no night time coverage needed. Pt having minimal intake of oral food, and encouraged to drink fluids. Pt has been incontinent of bowel and bladder. Turning and changing when needed. Will cont to monitor.

## 2018-09-02 NOTE — PROGRESS NOTES
Discharge order in, called and spoke to the CM on for today, she is aware, she states that [facility] cannot take the pt and she called and left a message for [address], she will follow up with me about discharge.   Cathy Linder

## 2018-09-26 PROBLEM — R77.8 ELEVATED TROPONIN: Status: RESOLVED | Noted: 2017-05-13 | Resolved: 2018-01-01

## 2019-01-01 ENCOUNTER — APPOINTMENT (OUTPATIENT)
Dept: ULTRASOUND IMAGING | Age: 69
DRG: 871 | End: 2019-01-01
Payer: MEDICARE

## 2019-01-01 ENCOUNTER — APPOINTMENT (OUTPATIENT)
Dept: VASCULAR LAB | Age: 69
DRG: 871 | End: 2019-01-01
Payer: MEDICARE

## 2019-01-01 ENCOUNTER — APPOINTMENT (OUTPATIENT)
Dept: CT IMAGING | Age: 69
DRG: 871 | End: 2019-01-01
Payer: MEDICARE

## 2019-01-01 ENCOUNTER — APPOINTMENT (OUTPATIENT)
Dept: GENERAL RADIOLOGY | Age: 69
DRG: 871 | End: 2019-01-01
Payer: MEDICARE

## 2019-01-01 ENCOUNTER — HOSPITAL ENCOUNTER (INPATIENT)
Age: 69
LOS: 7 days | Discharge: HOSPICE | DRG: 871 | End: 2019-07-05
Attending: EMERGENCY MEDICINE | Admitting: INTERNAL MEDICINE
Payer: MEDICARE

## 2019-01-01 ENCOUNTER — HOSPITAL ENCOUNTER (INPATIENT)
Age: 69
LOS: 2 days | DRG: 871 | End: 2019-07-07
Attending: INTERNAL MEDICINE
Payer: COMMERCIAL

## 2019-01-01 VITALS
BODY MASS INDEX: 31.7 KG/M2 | OXYGEN SATURATION: 100 % | SYSTOLIC BLOOD PRESSURE: 70 MMHG | HEART RATE: 54 BPM | RESPIRATION RATE: 6 BRPM | TEMPERATURE: 97.4 F | WEIGHT: 226.41 LBS | HEIGHT: 71 IN | DIASTOLIC BLOOD PRESSURE: 42 MMHG

## 2019-01-01 VITALS — HEART RATE: 45 BPM | RESPIRATION RATE: 11 BRPM

## 2019-01-01 DIAGNOSIS — I50.42 SYSTOLIC AND DIASTOLIC CHF, CHRONIC (HCC): Primary | Chronic | ICD-10-CM

## 2019-01-01 DIAGNOSIS — R65.21 SEPTIC SHOCK (HCC): ICD-10-CM

## 2019-01-01 DIAGNOSIS — A41.9 SEPTIC SHOCK (HCC): ICD-10-CM

## 2019-01-01 DIAGNOSIS — F14.10 COCAINE ABUSE (HCC): ICD-10-CM

## 2019-01-01 LAB
A/G RATIO: 0.7 (ref 1.1–2.2)
ALBUMIN SERPL-MCNC: 1.5 G/DL (ref 3.4–5)
ALBUMIN SERPL-MCNC: 1.6 G/DL (ref 3.4–5)
ALBUMIN SERPL-MCNC: 1.8 G/DL (ref 3.4–5)
ALBUMIN SERPL-MCNC: 1.9 G/DL (ref 3.4–5)
ALBUMIN SERPL-MCNC: 1.9 G/DL (ref 3.4–5)
ALBUMIN SERPL-MCNC: 2 G/DL (ref 3.4–5)
ALBUMIN SERPL-MCNC: 2.1 G/DL (ref 3.4–5)
ALBUMIN SERPL-MCNC: 2.3 G/DL (ref 3.4–5)
ALP BLD-CCNC: 118 U/L (ref 40–129)
ALP BLD-CCNC: 230 U/L (ref 40–129)
ALP BLD-CCNC: 70 U/L (ref 40–129)
ALP BLD-CCNC: 92 U/L (ref 40–129)
ALT SERPL-CCNC: 11 U/L (ref 10–40)
ALT SERPL-CCNC: 18 U/L (ref 10–40)
ALT SERPL-CCNC: 22 U/L (ref 10–40)
ALT SERPL-CCNC: 23 U/L (ref 10–40)
AMPHETAMINE SCREEN, URINE: ABNORMAL
ANION GAP SERPL CALCULATED.3IONS-SCNC: 12 MMOL/L (ref 3–16)
ANION GAP SERPL CALCULATED.3IONS-SCNC: 14 MMOL/L (ref 3–16)
ANION GAP SERPL CALCULATED.3IONS-SCNC: 15 MMOL/L (ref 3–16)
ANION GAP SERPL CALCULATED.3IONS-SCNC: 15 MMOL/L (ref 3–16)
ANION GAP SERPL CALCULATED.3IONS-SCNC: 16 MMOL/L (ref 3–16)
ANION GAP SERPL CALCULATED.3IONS-SCNC: 17 MMOL/L (ref 3–16)
ANISOCYTOSIS: ABNORMAL
APTT: 39.4 SEC (ref 26–36)
APTT: 42.4 SEC (ref 26–36)
AST SERPL-CCNC: 28 U/L (ref 15–37)
AST SERPL-CCNC: 38 U/L (ref 15–37)
AST SERPL-CCNC: 48 U/L (ref 15–37)
AST SERPL-CCNC: 52 U/L (ref 15–37)
BACTERIA: ABNORMAL /HPF
BANDED NEUTROPHILS RELATIVE PERCENT: 22 % (ref 0–7)
BANDED NEUTROPHILS RELATIVE PERCENT: 25 % (ref 0–7)
BANDED NEUTROPHILS RELATIVE PERCENT: 6 % (ref 0–7)
BANDED NEUTROPHILS RELATIVE PERCENT: 8 % (ref 0–7)
BARBITURATE SCREEN URINE: ABNORMAL
BASE EXCESS ARTERIAL: -10 (ref -3–3)
BASE EXCESS ARTERIAL: -12 (ref -3–3)
BASE EXCESS ARTERIAL: -17 (ref -3–3)
BASE EXCESS ARTERIAL: -9 (ref -3–3)
BASE EXCESS ARTERIAL: -9 (ref -3–3)
BASE EXCESS VENOUS: -6 (ref -3–3)
BASOPHILS ABSOLUTE: 0 K/UL (ref 0–0.2)
BASOPHILS ABSOLUTE: 0.1 K/UL (ref 0–0.2)
BASOPHILS RELATIVE PERCENT: 0 %
BASOPHILS RELATIVE PERCENT: 0.3 %
BENZODIAZEPINE SCREEN, URINE: ABNORMAL
BILIRUB SERPL-MCNC: 3.8 MG/DL (ref 0–1)
BILIRUB SERPL-MCNC: 3.8 MG/DL (ref 0–1)
BILIRUB SERPL-MCNC: 4.2 MG/DL (ref 0–1)
BILIRUB SERPL-MCNC: 4.4 MG/DL (ref 0–1)
BILIRUBIN DIRECT: 1.9 MG/DL (ref 0–0.3)
BILIRUBIN DIRECT: 3.3 MG/DL (ref 0–0.3)
BILIRUBIN DIRECT: 3.4 MG/DL (ref 0–0.3)
BILIRUBIN URINE: ABNORMAL
BILIRUBIN, INDIRECT: 0.8 MG/DL (ref 0–1)
BILIRUBIN, INDIRECT: 1.1 MG/DL (ref 0–1)
BILIRUBIN, INDIRECT: 1.9 MG/DL (ref 0–1)
BLOOD CULTURE, ROUTINE: ABNORMAL
BLOOD, URINE: ABNORMAL
BUN BLDV-MCNC: 35 MG/DL (ref 7–20)
BUN BLDV-MCNC: 37 MG/DL (ref 7–20)
BUN BLDV-MCNC: 39 MG/DL (ref 7–20)
BUN BLDV-MCNC: 41 MG/DL (ref 7–20)
BUN BLDV-MCNC: 43 MG/DL (ref 7–20)
BUN BLDV-MCNC: 46 MG/DL (ref 7–20)
BUN BLDV-MCNC: 46 MG/DL (ref 7–20)
BUN BLDV-MCNC: 47 MG/DL (ref 7–20)
BUN BLDV-MCNC: 54 MG/DL (ref 7–20)
BURR CELLS: ABNORMAL
CALCIUM IONIZED: 1.1 MMOL/L (ref 1.12–1.32)
CALCIUM IONIZED: 1.11 MMOL/L (ref 1.12–1.32)
CALCIUM IONIZED: 1.11 MMOL/L (ref 1.12–1.32)
CALCIUM SERPL-MCNC: 6.6 MG/DL (ref 8.3–10.6)
CALCIUM SERPL-MCNC: 7.2 MG/DL (ref 8.3–10.6)
CALCIUM SERPL-MCNC: 7.4 MG/DL (ref 8.3–10.6)
CALCIUM SERPL-MCNC: 7.4 MG/DL (ref 8.3–10.6)
CALCIUM SERPL-MCNC: 7.5 MG/DL (ref 8.3–10.6)
CALCIUM SERPL-MCNC: 7.5 MG/DL (ref 8.3–10.6)
CALCIUM SERPL-MCNC: 7.6 MG/DL (ref 8.3–10.6)
CALCIUM SERPL-MCNC: 7.6 MG/DL (ref 8.3–10.6)
CALCIUM SERPL-MCNC: 7.8 MG/DL (ref 8.3–10.6)
CANNABINOID SCREEN URINE: ABNORMAL
CHLORIDE BLD-SCNC: 101 MMOL/L (ref 99–110)
CHLORIDE BLD-SCNC: 93 MMOL/L (ref 99–110)
CHLORIDE BLD-SCNC: 93 MMOL/L (ref 99–110)
CHLORIDE BLD-SCNC: 94 MMOL/L (ref 99–110)
CHLORIDE BLD-SCNC: 96 MMOL/L (ref 99–110)
CHLORIDE BLD-SCNC: 97 MMOL/L (ref 99–110)
CHLORIDE URINE RANDOM: <20 MMOL/L
CHLORIDE URINE RANDOM: <20 MMOL/L
CHOLESTEROL, TOTAL: 42 MG/DL (ref 0–199)
CLARITY: CLEAR
CO2: 11 MMOL/L (ref 21–32)
CO2: 15 MMOL/L (ref 21–32)
CO2: 16 MMOL/L (ref 21–32)
CO2: 17 MMOL/L (ref 21–32)
CO2: 18 MMOL/L (ref 21–32)
CO2: 18 MMOL/L (ref 21–32)
CO2: 20 MMOL/L (ref 21–32)
COCAINE METABOLITE SCREEN URINE: POSITIVE
COLOR: YELLOW
CREAT SERPL-MCNC: 1.5 MG/DL (ref 0.8–1.3)
CREAT SERPL-MCNC: 1.5 MG/DL (ref 0.8–1.3)
CREAT SERPL-MCNC: 1.7 MG/DL (ref 0.8–1.3)
CREAT SERPL-MCNC: 1.8 MG/DL (ref 0.8–1.3)
CREAT SERPL-MCNC: 2.2 MG/DL (ref 0.8–1.3)
CREATININE URINE: 102.6 MG/DL (ref 39–259)
CREATININE URINE: 110.1 MG/DL (ref 39–259)
CULTURE, BLOOD 2: NORMAL
D DIMER: >5250 NG/ML DDU (ref 0–229)
DOHLE BODIES: PRESENT
EKG ATRIAL RATE: 187 BPM
EKG ATRIAL RATE: 197 BPM
EKG DIAGNOSIS: NORMAL
EKG DIAGNOSIS: NORMAL
EKG Q-T INTERVAL: 272 MS
EKG Q-T INTERVAL: 306 MS
EKG QRS DURATION: 82 MS
EKG QRS DURATION: 96 MS
EKG QTC CALCULATION (BAZETT): 456 MS
EKG QTC CALCULATION (BAZETT): 503 MS
EKG R AXIS: -54 DEGREES
EKG R AXIS: -56 DEGREES
EKG T AXIS: 109 DEGREES
EKG T AXIS: 120 DEGREES
EKG VENTRICULAR RATE: 163 BPM
EKG VENTRICULAR RATE: 169 BPM
EOSINOPHILS ABSOLUTE: 0 K/UL (ref 0–0.6)
EOSINOPHILS ABSOLUTE: 0.2 K/UL (ref 0–0.6)
EOSINOPHILS RELATIVE PERCENT: 0 %
EOSINOPHILS RELATIVE PERCENT: 0.8 %
FACTOR V ACTIVITY: 46 % (ref 62–140)
FACTOR VII ACTIVITY: 20 % (ref 80–181)
FACTOR VIII ACTIVITY: 335 % (ref 50–150)
FIBRINOGEN: 275 MG/DL (ref 200–397)
GFR AFRICAN AMERICAN: 36
GFR AFRICAN AMERICAN: 45
GFR AFRICAN AMERICAN: 48
GFR AFRICAN AMERICAN: 56
GFR AFRICAN AMERICAN: 56
GFR NON-AFRICAN AMERICAN: 30
GFR NON-AFRICAN AMERICAN: 37
GFR NON-AFRICAN AMERICAN: 40
GFR NON-AFRICAN AMERICAN: 46
GFR NON-AFRICAN AMERICAN: 46
GLOBULIN: 2.9 G/DL
GLUCOSE BLD-MCNC: 115 MG/DL (ref 70–99)
GLUCOSE BLD-MCNC: 115 MG/DL (ref 70–99)
GLUCOSE BLD-MCNC: 116 MG/DL (ref 70–99)
GLUCOSE BLD-MCNC: 118 MG/DL (ref 70–99)
GLUCOSE BLD-MCNC: 120 MG/DL (ref 70–99)
GLUCOSE BLD-MCNC: 125 MG/DL (ref 70–99)
GLUCOSE BLD-MCNC: 125 MG/DL (ref 70–99)
GLUCOSE BLD-MCNC: 127 MG/DL (ref 70–99)
GLUCOSE BLD-MCNC: 128 MG/DL (ref 70–99)
GLUCOSE BLD-MCNC: 132 MG/DL (ref 70–99)
GLUCOSE BLD-MCNC: 139 MG/DL (ref 70–99)
GLUCOSE BLD-MCNC: 140 MG/DL (ref 70–99)
GLUCOSE BLD-MCNC: 143 MG/DL (ref 70–99)
GLUCOSE BLD-MCNC: 144 MG/DL (ref 70–99)
GLUCOSE BLD-MCNC: 146 MG/DL (ref 70–99)
GLUCOSE BLD-MCNC: 149 MG/DL (ref 70–99)
GLUCOSE BLD-MCNC: 15 MG/DL (ref 70–99)
GLUCOSE BLD-MCNC: 152 MG/DL (ref 70–99)
GLUCOSE BLD-MCNC: 159 MG/DL (ref 70–99)
GLUCOSE BLD-MCNC: 165 MG/DL (ref 70–99)
GLUCOSE BLD-MCNC: 166 MG/DL (ref 70–99)
GLUCOSE BLD-MCNC: 172 MG/DL (ref 70–99)
GLUCOSE BLD-MCNC: 172 MG/DL (ref 70–99)
GLUCOSE BLD-MCNC: 176 MG/DL (ref 70–99)
GLUCOSE BLD-MCNC: 178 MG/DL (ref 70–99)
GLUCOSE BLD-MCNC: 183 MG/DL (ref 70–99)
GLUCOSE BLD-MCNC: 19 MG/DL (ref 70–99)
GLUCOSE BLD-MCNC: 19 MG/DL (ref 70–99)
GLUCOSE BLD-MCNC: 190 MG/DL (ref 70–99)
GLUCOSE BLD-MCNC: 193 MG/DL (ref 70–99)
GLUCOSE BLD-MCNC: 195 MG/DL (ref 70–99)
GLUCOSE BLD-MCNC: 196 MG/DL (ref 70–99)
GLUCOSE BLD-MCNC: 201 MG/DL (ref 70–99)
GLUCOSE BLD-MCNC: 207 MG/DL (ref 70–99)
GLUCOSE BLD-MCNC: 21 MG/DL (ref 70–99)
GLUCOSE BLD-MCNC: 24 MG/DL (ref 70–99)
GLUCOSE BLD-MCNC: 273 MG/DL (ref 70–99)
GLUCOSE BLD-MCNC: 58 MG/DL (ref 70–99)
GLUCOSE BLD-MCNC: 74 MG/DL (ref 70–99)
GLUCOSE BLD-MCNC: 79 MG/DL (ref 70–99)
GLUCOSE BLD-MCNC: 81 MG/DL (ref 70–99)
GLUCOSE BLD-MCNC: 82 MG/DL (ref 70–99)
GLUCOSE BLD-MCNC: 87 MG/DL (ref 70–99)
GLUCOSE BLD-MCNC: 89 MG/DL (ref 70–99)
GLUCOSE BLD-MCNC: 98 MG/DL (ref 70–99)
GLUCOSE URINE: NEGATIVE MG/DL
GRAM STAIN RESULT: ABNORMAL
HAV IGM SER IA-ACNC: NORMAL
HCO3 ARTERIAL: 12.7 MMOL/L (ref 21–29)
HCO3 ARTERIAL: 15.3 MMOL/L (ref 21–29)
HCO3 ARTERIAL: 15.5 MMOL/L (ref 21–29)
HCO3 ARTERIAL: 15.8 MMOL/L (ref 21–29)
HCO3 ARTERIAL: 8.2 MMOL/L (ref 21–29)
HCO3 VENOUS: 20.1 MMOL/L (ref 23–29)
HCT VFR BLD CALC: 31.1 % (ref 40.5–52.5)
HCT VFR BLD CALC: 34.6 % (ref 40.5–52.5)
HCT VFR BLD CALC: 37.4 % (ref 40.5–52.5)
HCT VFR BLD CALC: 39.2 % (ref 40.5–52.5)
HCT VFR BLD CALC: 39.3 % (ref 40.5–52.5)
HCT VFR BLD CALC: 41.5 % (ref 40.5–52.5)
HDLC SERPL-MCNC: 18 MG/DL (ref 40–60)
HEMOGLOBIN: 10.2 G/DL (ref 13.5–17.5)
HEMOGLOBIN: 11.4 G/DL (ref 13.5–17.5)
HEMOGLOBIN: 11.9 G/DL (ref 13.5–17.5)
HEMOGLOBIN: 12.6 G/DL (ref 13.5–17.5)
HEMOGLOBIN: 12.6 G/DL (ref 13.5–17.5)
HEMOGLOBIN: 13.1 G/DL (ref 13.5–17.5)
HEPATITIS B CORE IGM ANTIBODY: NORMAL
HEPATITIS B SURFACE ANTIGEN INTERPRETATION: NORMAL
HEPATITIS C ANTIBODY INTERPRETATION: NORMAL
HIV AG/AB: NORMAL
HIV ANTIGEN: NORMAL
HIV-1 ANTIBODY: NORMAL
HIV-2 AB: NORMAL
HYPOCHROMIA: ABNORMAL
INR BLD: 1.81 (ref 0.86–1.14)
INR BLD: 1.9 (ref 0.86–1.14)
INR BLD: 1.96 (ref 0.86–1.14)
INR BLD: 2.39 (ref 0.86–1.14)
INR BLD: 2.44 (ref 0.86–1.14)
INR BLD: 2.53 (ref 0.86–1.14)
INR BLD: 3.1 (ref 0.86–1.14)
KETONES, URINE: ABNORMAL MG/DL
LACTATE DEHYDROGENASE: 268 U/L (ref 100–190)
LACTATE: 1.69 MMOL/L (ref 0.4–2)
LACTATE: 2.68 MMOL/L (ref 0.4–2)
LACTATE: 3.12 MMOL/L (ref 0.4–2)
LACTATE: 3.12 MMOL/L (ref 0.4–2)
LACTATE: 3.24 MMOL/L (ref 0.4–2)
LACTATE: 3.38 MMOL/L (ref 0.4–2)
LACTIC ACID: 2.3 MMOL/L (ref 0.4–2)
LACTIC ACID: 2.3 MMOL/L (ref 0.4–2)
LACTIC ACID: 2.4 MMOL/L (ref 0.4–2)
LACTIC ACID: 2.7 MMOL/L (ref 0.4–2)
LACTIC ACID: 2.7 MMOL/L (ref 0.4–2)
LACTIC ACID: 2.8 MMOL/L (ref 0.4–2)
LACTIC ACID: 2.9 MMOL/L (ref 0.4–2)
LACTIC ACID: 3 MMOL/L (ref 0.4–2)
LACTIC ACID: 3 MMOL/L (ref 0.4–2)
LACTIC ACID: 3.3 MMOL/L (ref 0.4–2)
LACTIC ACID: 3.8 MMOL/L (ref 0.4–2)
LACTIC ACID: 3.8 MMOL/L (ref 0.4–2)
LACTIC ACID: 4 MMOL/L (ref 0.4–2)
LACTIC ACID: 4.1 MMOL/L (ref 0.4–2)
LACTIC ACID: 4.1 MMOL/L (ref 0.4–2)
LACTIC ACID: 4.4 MMOL/L (ref 0.4–2)
LACTIC ACID: 4.6 MMOL/L (ref 0.4–2)
LDL CHOLESTEROL CALCULATED: 11 MG/DL
LEUKOCYTE ESTERASE, URINE: NEGATIVE
LIPASE: <3 U/L (ref 13–60)
LV EF: 15 %
LVEF MODALITY: NORMAL
LYMPHOCYTES ABSOLUTE: 0.1 K/UL (ref 1–5.1)
LYMPHOCYTES ABSOLUTE: 1.4 K/UL (ref 1–5.1)
LYMPHOCYTES ABSOLUTE: 1.5 K/UL (ref 1–5.1)
LYMPHOCYTES ABSOLUTE: 2.8 K/UL (ref 1–5.1)
LYMPHOCYTES ABSOLUTE: 3.8 K/UL (ref 1–5.1)
LYMPHOCYTES ABSOLUTE: 3.8 K/UL (ref 1–5.1)
LYMPHOCYTES RELATIVE PERCENT: 0.6 %
LYMPHOCYTES RELATIVE PERCENT: 11 %
LYMPHOCYTES RELATIVE PERCENT: 11 %
LYMPHOCYTES RELATIVE PERCENT: 12 %
LYMPHOCYTES RELATIVE PERCENT: 13 %
LYMPHOCYTES RELATIVE PERCENT: 9 %
Lab: ABNORMAL
MACROCYTES: ABNORMAL
MACROCYTES: ABNORMAL
MAGNESIUM: 1.5 MG/DL (ref 1.8–2.4)
MAGNESIUM: 1.6 MG/DL (ref 1.8–2.4)
MAGNESIUM: 1.8 MG/DL (ref 1.8–2.4)
MAGNESIUM: 1.9 MG/DL (ref 1.8–2.4)
MAGNESIUM: 2 MG/DL (ref 1.8–2.4)
MAGNESIUM: 2 MG/DL (ref 1.8–2.4)
MCH RBC QN AUTO: 24.9 PG (ref 26–34)
MCH RBC QN AUTO: 25 PG (ref 26–34)
MCH RBC QN AUTO: 25 PG (ref 26–34)
MCH RBC QN AUTO: 25.2 PG (ref 26–34)
MCHC RBC AUTO-ENTMCNC: 31.6 G/DL (ref 31–36)
MCHC RBC AUTO-ENTMCNC: 31.8 G/DL (ref 31–36)
MCHC RBC AUTO-ENTMCNC: 32 G/DL (ref 31–36)
MCHC RBC AUTO-ENTMCNC: 32.1 G/DL (ref 31–36)
MCHC RBC AUTO-ENTMCNC: 32.8 G/DL (ref 31–36)
MCHC RBC AUTO-ENTMCNC: 32.9 G/DL (ref 31–36)
MCV RBC AUTO: 76.1 FL (ref 80–100)
MCV RBC AUTO: 76.5 FL (ref 80–100)
MCV RBC AUTO: 78 FL (ref 80–100)
MCV RBC AUTO: 78.4 FL (ref 80–100)
MCV RBC AUTO: 78.6 FL (ref 80–100)
MCV RBC AUTO: 79.6 FL (ref 80–100)
METAMYELOCYTES RELATIVE PERCENT: 1 %
METAMYELOCYTES RELATIVE PERCENT: 1 %
METAMYELOCYTES RELATIVE PERCENT: 4 %
METHADONE SCREEN, URINE: ABNORMAL
MICROCYTES: ABNORMAL
MICROCYTES: ABNORMAL
MICROSCOPIC EXAMINATION: YES
MONOCYTES ABSOLUTE: 0.1 K/UL (ref 0–1.3)
MONOCYTES ABSOLUTE: 0.3 K/UL (ref 0–1.3)
MONOCYTES ABSOLUTE: 0.5 K/UL (ref 0–1.3)
MONOCYTES ABSOLUTE: 0.8 K/UL (ref 0–1.3)
MONOCYTES RELATIVE PERCENT: 1 %
MONOCYTES RELATIVE PERCENT: 4 %
MONOCYTES RELATIVE PERCENT: 4 %
MUCUS: ABNORMAL /LPF
NEUTROPHILS ABSOLUTE: 11.4 K/UL (ref 1.7–7.7)
NEUTROPHILS ABSOLUTE: 20 K/UL (ref 1.7–7.7)
NEUTROPHILS ABSOLUTE: 27.8 K/UL (ref 1.7–7.7)
NEUTROPHILS ABSOLUTE: 28 K/UL (ref 1.7–7.7)
NEUTROPHILS ABSOLUTE: 30.4 K/UL (ref 1.7–7.7)
NEUTROPHILS ABSOLUTE: 9.6 K/UL (ref 1.7–7.7)
NEUTROPHILS RELATIVE PERCENT: 54 %
NEUTROPHILS RELATIVE PERCENT: 65 %
NEUTROPHILS RELATIVE PERCENT: 80 %
NEUTROPHILS RELATIVE PERCENT: 81 %
NEUTROPHILS RELATIVE PERCENT: 88 %
NEUTROPHILS RELATIVE PERCENT: 94.3 %
NITRITE, URINE: POSITIVE
NUCLEATED RED BLOOD CELLS: 1 /100 WBC
NUCLEATED RED BLOOD CELLS: 1 /100 WBC
O2 SAT, ARTERIAL: 94 % (ref 93–100)
O2 SAT, ARTERIAL: 94 % (ref 93–100)
O2 SAT, ARTERIAL: 95 % (ref 93–100)
O2 SAT, ARTERIAL: 98 % (ref 93–100)
O2 SAT, ARTERIAL: 98 % (ref 93–100)
O2 SAT, VEN: 54 %
OPIATE SCREEN URINE: ABNORMAL
ORGANISM: ABNORMAL
OSMOLALITY URINE: 295 MOSM/KG (ref 390–1070)
OSMOLALITY: 282 MOSM/KG (ref 278–305)
OXYCODONE URINE: ABNORMAL
PCO2 ARTERIAL: 13.7 MM HG (ref 35–45)
PCO2 ARTERIAL: 20.9 MM HG (ref 35–45)
PCO2 ARTERIAL: 24.3 MM HG (ref 35–45)
PCO2 ARTERIAL: 25.4 MM HG (ref 35–45)
PCO2 ARTERIAL: 27.2 MM HG (ref 35–45)
PCO2, VEN: 40.6 MM HG (ref 40–50)
PDW BLD-RTO: 19.5 % (ref 12.4–15.4)
PDW BLD-RTO: 19.6 % (ref 12.4–15.4)
PDW BLD-RTO: 19.8 % (ref 12.4–15.4)
PDW BLD-RTO: 19.9 % (ref 12.4–15.4)
PDW BLD-RTO: 19.9 % (ref 12.4–15.4)
PDW BLD-RTO: 20.4 % (ref 12.4–15.4)
PERFORMED ON: ABNORMAL
PERFORMED ON: NORMAL
PH ARTERIAL: 7.37 (ref 7.35–7.45)
PH ARTERIAL: 7.39 (ref 7.35–7.45)
PH ARTERIAL: 7.41 (ref 7.35–7.45)
PH UA: 5.5
PH UA: 5.5 (ref 5–8)
PH VENOUS: 7.3 (ref 7.35–7.45)
PHENCYCLIDINE SCREEN URINE: ABNORMAL
PHOSPHORUS: 3 MG/DL (ref 2.5–4.9)
PHOSPHORUS: 3.5 MG/DL (ref 2.5–4.9)
PHOSPHORUS: 3.7 MG/DL (ref 2.5–4.9)
PHOSPHORUS: 3.7 MG/DL (ref 2.5–4.9)
PHOSPHORUS: 3.9 MG/DL (ref 2.5–4.9)
PHOSPHORUS: 4 MG/DL (ref 2.5–4.9)
PHOSPHORUS: 4.1 MG/DL (ref 2.5–4.9)
PLATELET # BLD: 106 K/UL (ref 135–450)
PLATELET # BLD: 127 K/UL (ref 135–450)
PLATELET # BLD: 127 K/UL (ref 135–450)
PLATELET # BLD: 141 K/UL (ref 135–450)
PLATELET # BLD: 155 K/UL (ref 135–450)
PLATELET # BLD: 162 K/UL (ref 135–450)
PLATELET SLIDE REVIEW: ADEQUATE
PMV BLD AUTO: 10.3 FL (ref 5–10.5)
PMV BLD AUTO: 8.8 FL (ref 5–10.5)
PMV BLD AUTO: 9.1 FL (ref 5–10.5)
PMV BLD AUTO: 9.1 FL (ref 5–10.5)
PMV BLD AUTO: 9.3 FL (ref 5–10.5)
PMV BLD AUTO: 9.3 FL (ref 5–10.5)
PO2 ARTERIAL: 104.3 MM HG (ref 75–108)
PO2 ARTERIAL: 66.5 MM HG (ref 75–108)
PO2 ARTERIAL: 71.3 MM HG (ref 75–108)
PO2 ARTERIAL: 75.6 MM HG (ref 75–108)
PO2 ARTERIAL: 94 MM HG (ref 75–108)
PO2, VEN: 31 MM HG
POC POTASSIUM: 2.3 MMOL/L (ref 3.5–5.1)
POC POTASSIUM: 3.5 MMOL/L (ref 3.5–5.1)
POC POTASSIUM: 4.1 MMOL/L (ref 3.5–5.1)
POC POTASSIUM: 4.1 MMOL/L (ref 3.5–5.1)
POC SAMPLE TYPE: ABNORMAL
POC SODIUM: 125 MMOL/L (ref 136–145)
POC SODIUM: 126 MMOL/L (ref 136–145)
POC SODIUM: 128 MMOL/L (ref 136–145)
POIKILOCYTES: ABNORMAL
POTASSIUM REFLEX MAGNESIUM: 4.4 MMOL/L (ref 3.5–5.1)
POTASSIUM SERPL-SCNC: 3.8 MMOL/L (ref 3.5–5.1)
POTASSIUM SERPL-SCNC: 3.9 MMOL/L (ref 3.5–5.1)
POTASSIUM SERPL-SCNC: 4 MMOL/L (ref 3.5–5.1)
POTASSIUM SERPL-SCNC: 4.3 MMOL/L (ref 3.5–5.1)
POTASSIUM SERPL-SCNC: 4.5 MMOL/L (ref 3.5–5.1)
POTASSIUM SERPL-SCNC: 5.1 MMOL/L (ref 3.5–5.1)
PRO-BNP: ABNORMAL PG/ML (ref 0–124)
PROMYELOCYTES PERCENT: 1 %
PROPOXYPHENE SCREEN: ABNORMAL
PROTEIN PROTEIN: 52.7 MG/DL
PROTEIN UA: 100 MG/DL
PROTHROMBIN TIME: 20.6 SEC (ref 9.8–13)
PROTHROMBIN TIME: 21.7 SEC (ref 9.8–13)
PROTHROMBIN TIME: 22.4 SEC (ref 9.8–13)
PROTHROMBIN TIME: 27.2 SEC (ref 9.8–13)
PROTHROMBIN TIME: 27.8 SEC (ref 9.8–13)
PROTHROMBIN TIME: 28.8 SEC (ref 9.8–13)
PROTHROMBIN TIME: 35.3 SEC (ref 9.8–13)
RBC # BLD: 4.09 M/UL (ref 4.2–5.9)
RBC # BLD: 4.53 M/UL (ref 4.2–5.9)
RBC # BLD: 4.76 M/UL (ref 4.2–5.9)
RBC # BLD: 5 M/UL (ref 4.2–5.9)
RBC # BLD: 5.02 M/UL (ref 4.2–5.9)
RBC # BLD: 5.21 M/UL (ref 4.2–5.9)
RBC UA: ABNORMAL /HPF (ref 0–2)
REPORT: NORMAL
SCHISTOCYTES: ABNORMAL
SLIDE REVIEW: ABNORMAL
SODIUM BLD-SCNC: 124 MMOL/L (ref 136–145)
SODIUM BLD-SCNC: 125 MMOL/L (ref 136–145)
SODIUM BLD-SCNC: 126 MMOL/L (ref 136–145)
SODIUM BLD-SCNC: 126 MMOL/L (ref 136–145)
SODIUM BLD-SCNC: 128 MMOL/L (ref 136–145)
SODIUM BLD-SCNC: 129 MMOL/L (ref 136–145)
SODIUM URINE: 27 MMOL/L
SODIUM URINE: <20 MMOL/L
SPECIFIC GRAVITY UA: 1.02 (ref 1–1.03)
TARGET CELLS: ABNORMAL
TCO2 ARTERIAL: 13 MMOL/L
TCO2 ARTERIAL: 16 MMOL/L
TCO2 ARTERIAL: 16 MMOL/L
TCO2 ARTERIAL: 17 MMOL/L
TCO2 ARTERIAL: 9 MMOL/L
TCO2 CALC VENOUS: 21 MMOL/L
TOTAL PROTEIN: 4.1 G/DL (ref 6.4–8.2)
TOTAL PROTEIN: 4.9 G/DL (ref 6.4–8.2)
TOTAL PROTEIN: 4.9 G/DL (ref 6.4–8.2)
TOTAL PROTEIN: 5 G/DL (ref 6.4–8.2)
TOXIC GRANULATION: PRESENT
TRIGL SERPL-MCNC: 65 MG/DL (ref 0–150)
TROPONIN: 0.06 NG/ML
TROPONIN: 0.06 NG/ML
TROPONIN: 0.07 NG/ML
URINE TYPE: ABNORMAL
UROBILINOGEN, URINE: 1 E.U./DL
VANCOMYCIN RANDOM: 17.6 UG/ML
VANCOMYCIN RANDOM: 19.6 UG/ML
VANCOMYCIN RANDOM: 21.8 UG/ML
VANCOMYCIN RANDOM: 9.5 UG/ML
VLDLC SERPL CALC-MCNC: 13 MG/DL
WBC # BLD: 11.6 K/UL (ref 4–11)
WBC # BLD: 13 K/UL (ref 4–11)
WBC # BLD: 21.2 K/UL (ref 4–11)
WBC # BLD: 31.1 K/UL (ref 4–11)
WBC # BLD: 31.9 K/UL (ref 4–11)
WBC # BLD: 34.5 K/UL (ref 4–11)
WBC UA: ABNORMAL /HPF (ref 0–5)
WOUND/ABSCESS: ABNORMAL

## 2019-01-01 PROCEDURE — 82947 ASSAY GLUCOSE BLOOD QUANT: CPT

## 2019-01-01 PROCEDURE — 36415 COLL VENOUS BLD VENIPUNCTURE: CPT

## 2019-01-01 PROCEDURE — 96365 THER/PROPH/DIAG IV INF INIT: CPT

## 2019-01-01 PROCEDURE — 93306 TTE W/DOPPLER COMPLETE: CPT

## 2019-01-01 PROCEDURE — 84295 ASSAY OF SERUM SODIUM: CPT

## 2019-01-01 PROCEDURE — 85025 COMPLETE CBC W/AUTO DIFF WBC: CPT

## 2019-01-01 PROCEDURE — 99291 CRITICAL CARE FIRST HOUR: CPT | Performed by: INTERNAL MEDICINE

## 2019-01-01 PROCEDURE — 99285 EMERGENCY DEPT VISIT HI MDM: CPT

## 2019-01-01 PROCEDURE — 6360000002 HC RX W HCPCS: Performed by: INTERNAL MEDICINE

## 2019-01-01 PROCEDURE — 93005 ELECTROCARDIOGRAM TRACING: CPT | Performed by: STUDENT IN AN ORGANIZED HEALTH CARE EDUCATION/TRAINING PROGRAM

## 2019-01-01 PROCEDURE — 85610 PROTHROMBIN TIME: CPT

## 2019-01-01 PROCEDURE — 6360000002 HC RX W HCPCS: Performed by: STUDENT IN AN ORGANIZED HEALTH CARE EDUCATION/TRAINING PROGRAM

## 2019-01-01 PROCEDURE — 99232 SBSQ HOSP IP/OBS MODERATE 35: CPT | Performed by: NURSE PRACTITIONER

## 2019-01-01 PROCEDURE — 82803 BLOOD GASES ANY COMBINATION: CPT

## 2019-01-01 PROCEDURE — 83605 ASSAY OF LACTIC ACID: CPT

## 2019-01-01 PROCEDURE — 94761 N-INVAS EAR/PLS OXIMETRY MLT: CPT

## 2019-01-01 PROCEDURE — 83880 ASSAY OF NATRIURETIC PEPTIDE: CPT

## 2019-01-01 PROCEDURE — 82436 ASSAY OF URINE CHLORIDE: CPT

## 2019-01-01 PROCEDURE — 99233 SBSQ HOSP IP/OBS HIGH 50: CPT | Performed by: INTERNAL MEDICINE

## 2019-01-01 PROCEDURE — 83735 ASSAY OF MAGNESIUM: CPT

## 2019-01-01 PROCEDURE — 96375 TX/PRO/DX INJ NEW DRUG ADDON: CPT

## 2019-01-01 PROCEDURE — 2000000000 HC ICU R&B

## 2019-01-01 PROCEDURE — 80053 COMPREHEN METABOLIC PANEL: CPT

## 2019-01-01 PROCEDURE — 73090 X-RAY EXAM OF FOREARM: CPT

## 2019-01-01 PROCEDURE — 82570 ASSAY OF URINE CREATININE: CPT

## 2019-01-01 PROCEDURE — 2580000003 HC RX 258: Performed by: STUDENT IN AN ORGANIZED HEALTH CARE EDUCATION/TRAINING PROGRAM

## 2019-01-01 PROCEDURE — APPSS30 APP SPLIT SHARED TIME 16-30 MINUTES: Performed by: NURSE PRACTITIONER

## 2019-01-01 PROCEDURE — 86701 HIV-1ANTIBODY: CPT

## 2019-01-01 PROCEDURE — 83935 ASSAY OF URINE OSMOLALITY: CPT

## 2019-01-01 PROCEDURE — 85730 THROMBOPLASTIN TIME PARTIAL: CPT

## 2019-01-01 PROCEDURE — 83690 ASSAY OF LIPASE: CPT

## 2019-01-01 PROCEDURE — 83930 ASSAY OF BLOOD OSMOLALITY: CPT

## 2019-01-01 PROCEDURE — 6370000000 HC RX 637 (ALT 250 FOR IP): Performed by: STUDENT IN AN ORGANIZED HEALTH CARE EDUCATION/TRAINING PROGRAM

## 2019-01-01 PROCEDURE — 87801 DETECT AGNT MULT DNA AMPLI: CPT

## 2019-01-01 PROCEDURE — 2580000003 HC RX 258: Performed by: EMERGENCY MEDICINE

## 2019-01-01 PROCEDURE — 80202 ASSAY OF VANCOMYCIN: CPT

## 2019-01-01 PROCEDURE — 84156 ASSAY OF PROTEIN URINE: CPT

## 2019-01-01 PROCEDURE — 36592 COLLECT BLOOD FROM PICC: CPT

## 2019-01-01 PROCEDURE — 73200 CT UPPER EXTREMITY W/O DYE: CPT

## 2019-01-01 PROCEDURE — 1250000000 HC SEMI PRIVATE HOSPICE R&B

## 2019-01-01 PROCEDURE — 99233 SBSQ HOSP IP/OBS HIGH 50: CPT | Performed by: NURSE PRACTITIONER

## 2019-01-01 PROCEDURE — 84484 ASSAY OF TROPONIN QUANT: CPT

## 2019-01-01 PROCEDURE — 87390 HIV-1 AG IA: CPT

## 2019-01-01 PROCEDURE — 6360000002 HC RX W HCPCS: Performed by: EMERGENCY MEDICINE

## 2019-01-01 PROCEDURE — 02HV33Z INSERTION OF INFUSION DEVICE INTO SUPERIOR VENA CAVA, PERCUTANEOUS APPROACH: ICD-10-PCS | Performed by: INTERNAL MEDICINE

## 2019-01-01 PROCEDURE — 85379 FIBRIN DEGRADATION QUANT: CPT

## 2019-01-01 PROCEDURE — 99221 1ST HOSP IP/OBS SF/LOW 40: CPT | Performed by: ORTHOPAEDIC SURGERY

## 2019-01-01 PROCEDURE — 2580000003 HC RX 258: Performed by: INTERNAL MEDICINE

## 2019-01-01 PROCEDURE — 80076 HEPATIC FUNCTION PANEL: CPT

## 2019-01-01 PROCEDURE — 37799 UNLISTED PX VASCULAR SURGERY: CPT

## 2019-01-01 PROCEDURE — 2500000003 HC RX 250 WO HCPCS: Performed by: STUDENT IN AN ORGANIZED HEALTH CARE EDUCATION/TRAINING PROGRAM

## 2019-01-01 PROCEDURE — 36620 INSERTION CATHETER ARTERY: CPT | Performed by: INTERNAL MEDICINE

## 2019-01-01 PROCEDURE — 80069 RENAL FUNCTION PANEL: CPT

## 2019-01-01 PROCEDURE — 84300 ASSAY OF URINE SODIUM: CPT

## 2019-01-01 PROCEDURE — 96361 HYDRATE IV INFUSION ADD-ON: CPT

## 2019-01-01 PROCEDURE — 85240 CLOT FACTOR VIII AHG 1 STAGE: CPT

## 2019-01-01 PROCEDURE — 80307 DRUG TEST PRSMV CHEM ANLYZR: CPT

## 2019-01-01 PROCEDURE — 2700000000 HC OXYGEN THERAPY PER DAY

## 2019-01-01 PROCEDURE — 93010 ELECTROCARDIOGRAM REPORT: CPT | Performed by: INTERNAL MEDICINE

## 2019-01-01 PROCEDURE — 93931 UPPER EXTREMITY STUDY: CPT

## 2019-01-01 PROCEDURE — 36620 INSERTION CATHETER ARTERY: CPT

## 2019-01-01 PROCEDURE — 85384 FIBRINOGEN ACTIVITY: CPT

## 2019-01-01 PROCEDURE — 87040 BLOOD CULTURE FOR BACTERIA: CPT

## 2019-01-01 PROCEDURE — 74018 RADEX ABDOMEN 1 VIEW: CPT

## 2019-01-01 PROCEDURE — 71045 X-RAY EXAM CHEST 1 VIEW: CPT

## 2019-01-01 PROCEDURE — 80048 BASIC METABOLIC PNL TOTAL CA: CPT

## 2019-01-01 PROCEDURE — 84132 ASSAY OF SERUM POTASSIUM: CPT

## 2019-01-01 PROCEDURE — 83615 LACTATE (LD) (LDH) ENZYME: CPT

## 2019-01-01 PROCEDURE — 1200000000 HC SEMI PRIVATE

## 2019-01-01 PROCEDURE — 99255 IP/OBS CONSLTJ NEW/EST HI 80: CPT | Performed by: INTERNAL MEDICINE

## 2019-01-01 PROCEDURE — 80074 ACUTE HEPATITIS PANEL: CPT

## 2019-01-01 PROCEDURE — 36556 INSERT NON-TUNNEL CV CATH: CPT | Performed by: INTERNAL MEDICINE

## 2019-01-01 PROCEDURE — 76705 ECHO EXAM OF ABDOMEN: CPT

## 2019-01-01 PROCEDURE — 85230 CLOT FACTOR VII PROCONVERTIN: CPT

## 2019-01-01 PROCEDURE — 6370000000 HC RX 637 (ALT 250 FOR IP): Performed by: INTERNAL MEDICINE

## 2019-01-01 PROCEDURE — 87205 SMEAR GRAM STAIN: CPT

## 2019-01-01 PROCEDURE — 85220 BLOOC CLOT FACTOR V TEST: CPT

## 2019-01-01 PROCEDURE — 80061 LIPID PANEL: CPT

## 2019-01-01 PROCEDURE — 99221 1ST HOSP IP/OBS SF/LOW 40: CPT | Performed by: SURGERY

## 2019-01-01 PROCEDURE — 99232 SBSQ HOSP IP/OBS MODERATE 35: CPT | Performed by: INTERNAL MEDICINE

## 2019-01-01 PROCEDURE — 82330 ASSAY OF CALCIUM: CPT

## 2019-01-01 PROCEDURE — 2580000003 HC RX 258

## 2019-01-01 PROCEDURE — 2500000003 HC RX 250 WO HCPCS: Performed by: INTERNAL MEDICINE

## 2019-01-01 PROCEDURE — 99221 1ST HOSP IP/OBS SF/LOW 40: CPT | Performed by: NURSE PRACTITIONER

## 2019-01-01 PROCEDURE — 87077 CULTURE AEROBIC IDENTIFY: CPT

## 2019-01-01 PROCEDURE — 6360000002 HC RX W HCPCS: Performed by: FAMILY MEDICINE

## 2019-01-01 PROCEDURE — 99223 1ST HOSP IP/OBS HIGH 75: CPT | Performed by: INTERNAL MEDICINE

## 2019-01-01 PROCEDURE — 87070 CULTURE OTHR SPECIMN AEROBIC: CPT

## 2019-01-01 PROCEDURE — 81001 URINALYSIS AUTO W/SCOPE: CPT

## 2019-01-01 PROCEDURE — 36556 INSERT NON-TUNNEL CV CATH: CPT

## 2019-01-01 PROCEDURE — 73070 X-RAY EXAM OF ELBOW: CPT

## 2019-01-01 PROCEDURE — 86702 HIV-2 ANTIBODY: CPT

## 2019-01-01 PROCEDURE — 73560 X-RAY EXAM OF KNEE 1 OR 2: CPT

## 2019-01-01 RX ORDER — LORAZEPAM 2 MG/ML
1 INJECTION INTRAMUSCULAR
Status: CANCELLED | OUTPATIENT
Start: 2019-01-01

## 2019-01-01 RX ORDER — LORAZEPAM 2 MG/ML
1 INJECTION INTRAMUSCULAR
Status: DISCONTINUED | OUTPATIENT
Start: 2019-01-01 | End: 2019-01-01 | Stop reason: HOSPADM

## 2019-01-01 RX ORDER — MAGNESIUM SULFATE IN WATER 40 MG/ML
2 INJECTION, SOLUTION INTRAVENOUS ONCE
Status: COMPLETED | OUTPATIENT
Start: 2019-01-01 | End: 2019-01-01

## 2019-01-01 RX ORDER — FINASTERIDE 5 MG/1
5 TABLET, FILM COATED ORAL DAILY
Status: CANCELLED | OUTPATIENT
Start: 2019-01-01

## 2019-01-01 RX ORDER — SODIUM CHLORIDE 9 MG/ML
INJECTION, SOLUTION INTRAVENOUS
Status: COMPLETED
Start: 2019-01-01 | End: 2019-01-01

## 2019-01-01 RX ORDER — TAMSULOSIN HYDROCHLORIDE 0.4 MG/1
0.4 CAPSULE ORAL DAILY
Status: CANCELLED | OUTPATIENT
Start: 2019-01-01

## 2019-01-01 RX ORDER — 0.9 % SODIUM CHLORIDE 0.9 %
500 INTRAVENOUS SOLUTION INTRAVENOUS ONCE
Status: COMPLETED | OUTPATIENT
Start: 2019-01-01 | End: 2019-01-01

## 2019-01-01 RX ORDER — ATROPINE SULFATE 10 MG/ML
1 SOLUTION/ DROPS OPHTHALMIC
Status: DISCONTINUED | OUTPATIENT
Start: 2019-01-01 | End: 2019-01-01 | Stop reason: HOSPADM

## 2019-01-01 RX ORDER — ONDANSETRON 2 MG/ML
4 INJECTION INTRAMUSCULAR; INTRAVENOUS ONCE
Status: COMPLETED | OUTPATIENT
Start: 2019-01-01 | End: 2019-01-01

## 2019-01-01 RX ORDER — ONDANSETRON 2 MG/ML
4 INJECTION INTRAMUSCULAR; INTRAVENOUS EVERY 6 HOURS PRN
Status: DISCONTINUED | OUTPATIENT
Start: 2019-01-01 | End: 2019-01-01 | Stop reason: HOSPADM

## 2019-01-01 RX ORDER — FAMOTIDINE 20 MG/1
20 TABLET, FILM COATED ORAL 2 TIMES DAILY
Status: CANCELLED | OUTPATIENT
Start: 2019-01-01

## 2019-01-01 RX ORDER — DOPAMINE HYDROCHLORIDE 160 MG/100ML
5 INJECTION, SOLUTION INTRAVENOUS CONTINUOUS
Status: DISCONTINUED | OUTPATIENT
Start: 2019-01-01 | End: 2019-01-01

## 2019-01-01 RX ORDER — 0.9 % SODIUM CHLORIDE 0.9 %
250 INTRAVENOUS SOLUTION INTRAVENOUS ONCE
Status: COMPLETED | OUTPATIENT
Start: 2019-01-01 | End: 2019-01-01

## 2019-01-01 RX ORDER — SODIUM CHLORIDE 0.9 % (FLUSH) 0.9 %
10 SYRINGE (ML) INJECTION EVERY 12 HOURS SCHEDULED
Status: DISCONTINUED | OUTPATIENT
Start: 2019-01-01 | End: 2019-01-01 | Stop reason: HOSPADM

## 2019-01-01 RX ORDER — OXYCODONE HYDROCHLORIDE AND ACETAMINOPHEN 5; 325 MG/1; MG/1
1 TABLET ORAL EVERY 6 HOURS PRN
Status: CANCELLED | OUTPATIENT
Start: 2019-01-01

## 2019-01-01 RX ORDER — DOPAMINE HYDROCHLORIDE 160 MG/100ML
INJECTION, SOLUTION INTRAVENOUS
Status: DISPENSED
Start: 2019-01-01 | End: 2019-01-01

## 2019-01-01 RX ORDER — SODIUM CHLORIDE 9 MG/ML
INJECTION, SOLUTION INTRAVENOUS CONTINUOUS
Status: DISCONTINUED | OUTPATIENT
Start: 2019-01-01 | End: 2019-01-01

## 2019-01-01 RX ORDER — MORPHINE SULFATE 2 MG/ML
4 INJECTION, SOLUTION INTRAMUSCULAR; INTRAVENOUS
Status: DISCONTINUED | OUTPATIENT
Start: 2019-01-01 | End: 2019-01-01 | Stop reason: HOSPADM

## 2019-01-01 RX ORDER — DIMETHICONE, CAMPHOR (SYNTHETIC), MENTHOL, AND PHENOL 1.1; .5; .625; .5 G/100G; G/100G; G/100G; G/100G
OINTMENT TOPICAL PRN
Status: DISCONTINUED | OUTPATIENT
Start: 2019-01-01 | End: 2019-01-01 | Stop reason: HOSPADM

## 2019-01-01 RX ORDER — FUROSEMIDE 10 MG/ML
40 INJECTION INTRAMUSCULAR; INTRAVENOUS ONCE
Status: COMPLETED | OUTPATIENT
Start: 2019-01-01 | End: 2019-01-01

## 2019-01-01 RX ORDER — LORAZEPAM 2 MG/ML
1 INJECTION INTRAMUSCULAR EVERY 4 HOURS PRN
Status: DISCONTINUED | OUTPATIENT
Start: 2019-01-01 | End: 2019-01-01

## 2019-01-01 RX ORDER — ATORVASTATIN CALCIUM 40 MG/1
40 TABLET, FILM COATED ORAL NIGHTLY
Status: CANCELLED | OUTPATIENT
Start: 2019-01-01

## 2019-01-01 RX ORDER — SODIUM CHLORIDE 9 MG/ML
INJECTION, SOLUTION INTRAVENOUS
Status: DISPENSED
Start: 2019-01-01 | End: 2019-01-01

## 2019-01-01 RX ORDER — 0.9 % SODIUM CHLORIDE 0.9 %
30 INTRAVENOUS SOLUTION INTRAVENOUS ONCE
Status: COMPLETED | OUTPATIENT
Start: 2019-01-01 | End: 2019-01-01

## 2019-01-01 RX ORDER — CLINDAMYCIN PHOSPHATE 900 MG/50ML
900 INJECTION INTRAVENOUS EVERY 8 HOURS
Status: DISCONTINUED | OUTPATIENT
Start: 2019-01-01 | End: 2019-01-01

## 2019-01-01 RX ORDER — DIMETHICONE, CAMPHOR (SYNTHETIC), MENTHOL, AND PHENOL 1.1; .5; .625; .5 G/100G; G/100G; G/100G; G/100G
OINTMENT TOPICAL PRN
Status: CANCELLED | OUTPATIENT
Start: 2019-01-01

## 2019-01-01 RX ORDER — BUSPIRONE HYDROCHLORIDE 10 MG/1
10 TABLET ORAL DAILY
Status: CANCELLED | OUTPATIENT
Start: 2019-01-01

## 2019-01-01 RX ORDER — SODIUM CHLORIDE 0.9 % (FLUSH) 0.9 %
10 SYRINGE (ML) INJECTION PRN
Status: DISCONTINUED | OUTPATIENT
Start: 2019-01-01 | End: 2019-01-01 | Stop reason: HOSPADM

## 2019-01-01 RX ORDER — ONDANSETRON 2 MG/ML
4 INJECTION INTRAMUSCULAR; INTRAVENOUS EVERY 6 HOURS PRN
Status: CANCELLED | OUTPATIENT
Start: 2019-01-01

## 2019-01-01 RX ORDER — LEVETIRACETAM 250 MG/1
750 TABLET ORAL 2 TIMES DAILY
Status: CANCELLED | OUTPATIENT
Start: 2019-01-01

## 2019-01-01 RX ORDER — DEXTROSE MONOHYDRATE 50 MG/ML
100 INJECTION, SOLUTION INTRAVENOUS PRN
Status: DISCONTINUED | OUTPATIENT
Start: 2019-01-01 | End: 2019-01-01 | Stop reason: HOSPADM

## 2019-01-01 RX ORDER — POTASSIUM CHLORIDE 7.45 MG/ML
10 INJECTION INTRAVENOUS
Status: DISCONTINUED | OUTPATIENT
Start: 2019-01-01 | End: 2019-01-01

## 2019-01-01 RX ORDER — NICOTINE POLACRILEX 4 MG
15 LOZENGE BUCCAL PRN
Status: DISCONTINUED | OUTPATIENT
Start: 2019-01-01 | End: 2019-01-01 | Stop reason: HOSPADM

## 2019-01-01 RX ORDER — POTASSIUM CHLORIDE 29.8 MG/ML
20 INJECTION INTRAVENOUS
Status: DISCONTINUED | OUTPATIENT
Start: 2019-01-01 | End: 2019-01-01

## 2019-01-01 RX ORDER — MAGNESIUM SULFATE 1 G/100ML
1 INJECTION INTRAVENOUS PRN
Status: DISCONTINUED | OUTPATIENT
Start: 2019-01-01 | End: 2019-01-01 | Stop reason: HOSPADM

## 2019-01-01 RX ORDER — POTASSIUM CHLORIDE 20 MEQ/1
40 TABLET, EXTENDED RELEASE ORAL
Status: DISCONTINUED | OUTPATIENT
Start: 2019-01-01 | End: 2019-01-01

## 2019-01-01 RX ORDER — MORPHINE SULFATE 2 MG/ML
4 INJECTION, SOLUTION INTRAMUSCULAR; INTRAVENOUS EVERY 4 HOURS PRN
Status: DISCONTINUED | OUTPATIENT
Start: 2019-01-01 | End: 2019-01-01

## 2019-01-01 RX ORDER — FENTANYL CITRATE 50 UG/ML
25 INJECTION, SOLUTION INTRAMUSCULAR; INTRAVENOUS ONCE
Status: COMPLETED | OUTPATIENT
Start: 2019-01-01 | End: 2019-01-01

## 2019-01-01 RX ORDER — FAMOTIDINE 20 MG/1
20 TABLET, FILM COATED ORAL DAILY
Status: DISCONTINUED | OUTPATIENT
Start: 2019-01-01 | End: 2019-01-01

## 2019-01-01 RX ORDER — SODIUM CHLORIDE 0.9 % (FLUSH) 0.9 %
10 SYRINGE (ML) INJECTION PRN
Status: CANCELLED | OUTPATIENT
Start: 2019-01-01

## 2019-01-01 RX ORDER — ASPIRIN 81 MG/1
81 TABLET, CHEWABLE ORAL DAILY
Status: CANCELLED | OUTPATIENT
Start: 2019-01-01

## 2019-01-01 RX ORDER — SODIUM CHLORIDE 9 MG/ML
INJECTION, SOLUTION INTRAVENOUS
Status: DISCONTINUED
Start: 2019-01-01 | End: 2019-01-01

## 2019-01-01 RX ORDER — DEXTROSE MONOHYDRATE 25 G/50ML
12.5 INJECTION, SOLUTION INTRAVENOUS PRN
Status: DISCONTINUED | OUTPATIENT
Start: 2019-01-01 | End: 2019-01-01 | Stop reason: HOSPADM

## 2019-01-01 RX ORDER — MAGNESIUM SULFATE IN WATER 40 MG/ML
2 INJECTION, SOLUTION INTRAVENOUS
Status: DISCONTINUED | OUTPATIENT
Start: 2019-01-01 | End: 2019-01-01

## 2019-01-01 RX ORDER — ASPIRIN 81 MG/1
81 TABLET, CHEWABLE ORAL DAILY
Status: DISCONTINUED | OUTPATIENT
Start: 2019-01-01 | End: 2019-01-01

## 2019-01-01 RX ORDER — POTASSIUM CHLORIDE 1.5 G/1.77G
40 POWDER, FOR SOLUTION ORAL
Status: DISCONTINUED | OUTPATIENT
Start: 2019-01-01 | End: 2019-01-01

## 2019-01-01 RX ORDER — DOPAMINE HYDROCHLORIDE 160 MG/100ML
2.5 INJECTION, SOLUTION INTRAVENOUS CONTINUOUS
Status: DISCONTINUED | OUTPATIENT
Start: 2019-01-01 | End: 2019-01-01 | Stop reason: SDUPTHER

## 2019-01-01 RX ORDER — ATROPINE SULFATE 10 MG/ML
1 SOLUTION/ DROPS OPHTHALMIC
Status: CANCELLED | OUTPATIENT
Start: 2019-01-01

## 2019-01-01 RX ORDER — SODIUM CHLORIDE 0.9 % (FLUSH) 0.9 %
10 SYRINGE (ML) INJECTION EVERY 12 HOURS SCHEDULED
Status: CANCELLED | OUTPATIENT
Start: 2019-01-01

## 2019-01-01 RX ORDER — MAGNESIUM SULFATE IN WATER 40 MG/ML
2 INJECTION, SOLUTION INTRAVENOUS
Status: ACTIVE | OUTPATIENT
Start: 2019-01-01 | End: 2019-01-01

## 2019-01-01 RX ADMIN — INSULIN LISPRO 1 UNITS: 100 INJECTION, SOLUTION INTRAVENOUS; SUBCUTANEOUS at 13:09

## 2019-01-01 RX ADMIN — CLINDAMYCIN PHOSPHATE 900 MG: 900 INJECTION, SOLUTION INTRAVENOUS at 17:11

## 2019-01-01 RX ADMIN — ATROPINE SULFATE 1 DROP: 10 SOLUTION OPHTHALMIC at 10:48

## 2019-01-01 RX ADMIN — DEXTROSE MONOHYDRATE 2 MCG/MIN: 5 INJECTION, SOLUTION INTRAVENOUS at 21:53

## 2019-01-01 RX ADMIN — CLINDAMYCIN PHOSPHATE 900 MG: 900 INJECTION, SOLUTION INTRAVENOUS at 02:21

## 2019-01-01 RX ADMIN — NOREPINEPHRINE BITARTRATE 3 MCG/MIN: 1 INJECTION INTRAVENOUS at 11:57

## 2019-01-01 RX ADMIN — PIPERACILLIN SODIUM,TAZOBACTAM SODIUM 3.38 G: 3; .375 INJECTION, POWDER, FOR SOLUTION INTRAVENOUS at 05:02

## 2019-01-01 RX ADMIN — PIPERACILLIN SODIUM,TAZOBACTAM SODIUM 3.38 G: 3; .375 INJECTION, POWDER, FOR SOLUTION INTRAVENOUS at 14:31

## 2019-01-01 RX ADMIN — CLINDAMYCIN PHOSPHATE 900 MG: 900 INJECTION, SOLUTION INTRAVENOUS at 01:42

## 2019-01-01 RX ADMIN — PIPERACILLIN SODIUM,TAZOBACTAM SODIUM 3.38 G: 3; .375 INJECTION, POWDER, FOR SOLUTION INTRAVENOUS at 14:22

## 2019-01-01 RX ADMIN — CLINDAMYCIN PHOSPHATE 900 MG: 900 INJECTION, SOLUTION INTRAVENOUS at 18:15

## 2019-01-01 RX ADMIN — PIPERACILLIN SODIUM,TAZOBACTAM SODIUM 3.38 G: 3; .375 INJECTION, POWDER, FOR SOLUTION INTRAVENOUS at 22:25

## 2019-01-01 RX ADMIN — MORPHINE SULFATE 4 MG: 2 INJECTION, SOLUTION INTRAMUSCULAR; INTRAVENOUS at 06:17

## 2019-01-01 RX ADMIN — PHYTONADIONE 10 MG: 10 INJECTION, EMULSION INTRAMUSCULAR; INTRAVENOUS; SUBCUTANEOUS at 09:59

## 2019-01-01 RX ADMIN — LORAZEPAM 1 MG: 2 INJECTION INTRAMUSCULAR; INTRAVENOUS at 23:24

## 2019-01-01 RX ADMIN — MUPIROCIN: 20 OINTMENT TOPICAL at 09:38

## 2019-01-01 RX ADMIN — SODIUM CHLORIDE 250 ML: 9 INJECTION, SOLUTION INTRAVENOUS at 22:43

## 2019-01-01 RX ADMIN — FUROSEMIDE 10 MG/HR: 10 INJECTION, SOLUTION INTRAMUSCULAR; INTRAVENOUS at 05:21

## 2019-01-01 RX ADMIN — SODIUM CHLORIDE 500 ML: 9 INJECTION, SOLUTION INTRAVENOUS at 18:20

## 2019-01-01 RX ADMIN — AMIODARONE HYDROCHLORIDE 150 MG: 50 INJECTION, SOLUTION INTRAVENOUS at 21:15

## 2019-01-01 RX ADMIN — MORPHINE SULFATE 4 MG: 2 INJECTION, SOLUTION INTRAMUSCULAR; INTRAVENOUS at 10:37

## 2019-01-01 RX ADMIN — ATROPINE SULFATE 1 DROP: 10 SOLUTION OPHTHALMIC at 00:07

## 2019-01-01 RX ADMIN — PIPERACILLIN SODIUM,TAZOBACTAM SODIUM 3.38 G: 3; .375 INJECTION, POWDER, FOR SOLUTION INTRAVENOUS at 14:25

## 2019-01-01 RX ADMIN — VANCOMYCIN HYDROCHLORIDE 1000 MG: 10 INJECTION, POWDER, LYOPHILIZED, FOR SOLUTION INTRAVENOUS at 09:40

## 2019-01-01 RX ADMIN — Medication 10 ML: at 22:28

## 2019-01-01 RX ADMIN — MORPHINE SULFATE 4 MG: 2 INJECTION, SOLUTION INTRAMUSCULAR; INTRAVENOUS at 19:42

## 2019-01-01 RX ADMIN — DOPAMINE HYDROCHLORIDE 7.5 MCG/KG/MIN: 160 INJECTION, SOLUTION INTRAVENOUS at 18:45

## 2019-01-01 RX ADMIN — LEVETIRACETAM 750 MG: 100 INJECTION, SOLUTION INTRAVENOUS at 00:11

## 2019-01-01 RX ADMIN — CLINDAMYCIN PHOSPHATE 900 MG: 900 INJECTION, SOLUTION INTRAVENOUS at 12:10

## 2019-01-01 RX ADMIN — HYDROMORPHONE HYDROCHLORIDE 0.25 MG: 1 INJECTION, SOLUTION INTRAMUSCULAR; INTRAVENOUS; SUBCUTANEOUS at 04:18

## 2019-01-01 RX ADMIN — PIPERACILLIN SODIUM,TAZOBACTAM SODIUM 3.38 G: 3; .375 INJECTION, POWDER, FOR SOLUTION INTRAVENOUS at 20:34

## 2019-01-01 RX ADMIN — AMPICILLIN SODIUM AND SULBACTAM SODIUM 3 G: 2; 1 INJECTION, POWDER, FOR SOLUTION INTRAMUSCULAR; INTRAVENOUS at 15:06

## 2019-01-01 RX ADMIN — LEVETIRACETAM 750 MG: 100 INJECTION, SOLUTION INTRAVENOUS at 12:21

## 2019-01-01 RX ADMIN — HYDROMORPHONE HYDROCHLORIDE 1 MG: 1 INJECTION, SOLUTION INTRAMUSCULAR; INTRAVENOUS; SUBCUTANEOUS at 13:12

## 2019-01-01 RX ADMIN — AMIODARONE HYDROCHLORIDE 1 MG/MIN: 50 INJECTION, SOLUTION INTRAVENOUS at 21:29

## 2019-01-01 RX ADMIN — LORAZEPAM 1 MG: 2 INJECTION INTRAMUSCULAR; INTRAVENOUS at 20:16

## 2019-01-01 RX ADMIN — DOPAMINE HYDROCHLORIDE 5 MCG/KG/MIN: 160 INJECTION, SOLUTION INTRAVENOUS at 05:04

## 2019-01-01 RX ADMIN — Medication 10 ML: at 10:55

## 2019-01-01 RX ADMIN — PIPERACILLIN SODIUM,TAZOBACTAM SODIUM 3.38 G: 3; .375 INJECTION, POWDER, FOR SOLUTION INTRAVENOUS at 21:22

## 2019-01-01 RX ADMIN — LEVETIRACETAM 750 MG: 100 INJECTION, SOLUTION INTRAVENOUS at 14:07

## 2019-01-01 RX ADMIN — MORPHINE SULFATE 4 MG: 2 INJECTION, SOLUTION INTRAMUSCULAR; INTRAVENOUS at 23:44

## 2019-01-01 RX ADMIN — Medication 10 ML: at 09:28

## 2019-01-01 RX ADMIN — PIPERACILLIN SODIUM,TAZOBACTAM SODIUM 3.38 G: 3; .375 INJECTION, POWDER, FOR SOLUTION INTRAVENOUS at 12:56

## 2019-01-01 RX ADMIN — MUPIROCIN: 20 OINTMENT TOPICAL at 09:16

## 2019-01-01 RX ADMIN — ASPIRIN 81 MG 81 MG: 81 TABLET ORAL at 09:27

## 2019-01-01 RX ADMIN — CLINDAMYCIN PHOSPHATE 900 MG: 900 INJECTION, SOLUTION INTRAVENOUS at 09:26

## 2019-01-01 RX ADMIN — INSULIN LISPRO 1 UNITS: 100 INJECTION, SOLUTION INTRAVENOUS; SUBCUTANEOUS at 22:38

## 2019-01-01 RX ADMIN — LORAZEPAM 1 MG: 2 INJECTION INTRAMUSCULAR; INTRAVENOUS at 13:12

## 2019-01-01 RX ADMIN — LORAZEPAM 1 MG: 2 INJECTION INTRAMUSCULAR; INTRAVENOUS at 10:38

## 2019-01-01 RX ADMIN — SODIUM CHLORIDE 2721 ML: 9 INJECTION, SOLUTION INTRAVENOUS at 18:55

## 2019-01-01 RX ADMIN — INSULIN LISPRO 1 UNITS: 100 INJECTION, SOLUTION INTRAVENOUS; SUBCUTANEOUS at 17:03

## 2019-01-01 RX ADMIN — FUROSEMIDE 10 MG/HR: 10 INJECTION, SOLUTION INTRAMUSCULAR; INTRAVENOUS at 22:08

## 2019-01-01 RX ADMIN — FUROSEMIDE 40 MG: 10 INJECTION, SOLUTION INTRAMUSCULAR; INTRAVENOUS at 22:00

## 2019-01-01 RX ADMIN — SODIUM CHLORIDE: 9 INJECTION, SOLUTION INTRAVENOUS at 01:36

## 2019-01-01 RX ADMIN — LORAZEPAM 1 MG: 2 INJECTION INTRAMUSCULAR; INTRAVENOUS at 20:12

## 2019-01-01 RX ADMIN — Medication 10 ML: at 08:19

## 2019-01-01 RX ADMIN — DOPAMINE HYDROCHLORIDE 3 MCG/KG/MIN: 160 INJECTION, SOLUTION INTRAVENOUS at 07:52

## 2019-01-01 RX ADMIN — MORPHINE SULFATE 4 MG: 2 INJECTION, SOLUTION INTRAMUSCULAR; INTRAVENOUS at 08:11

## 2019-01-01 RX ADMIN — AMIODARONE HYDROCHLORIDE 0.5 MG/MIN: 50 INJECTION, SOLUTION INTRAVENOUS at 05:22

## 2019-01-01 RX ADMIN — VANCOMYCIN HYDROCHLORIDE 1500 MG: 10 INJECTION, POWDER, LYOPHILIZED, FOR SOLUTION INTRAVENOUS at 11:27

## 2019-01-01 RX ADMIN — FAMOTIDINE 20 MG: 10 INJECTION, SOLUTION INTRAVENOUS at 12:11

## 2019-01-01 RX ADMIN — NOREPINEPHRINE BITARTRATE 15 MCG/MIN: 1 INJECTION INTRAVENOUS at 14:13

## 2019-01-01 RX ADMIN — LEVETIRACETAM 750 MG: 100 INJECTION, SOLUTION INTRAVENOUS at 12:04

## 2019-01-01 RX ADMIN — VANCOMYCIN HYDROCHLORIDE 1500 MG: 10 INJECTION, POWDER, LYOPHILIZED, FOR SOLUTION INTRAVENOUS at 19:52

## 2019-01-01 RX ADMIN — FAMOTIDINE 20 MG: 10 INJECTION, SOLUTION INTRAVENOUS at 09:25

## 2019-01-01 RX ADMIN — LORAZEPAM 1 MG: 2 INJECTION INTRAMUSCULAR; INTRAVENOUS at 08:12

## 2019-01-01 RX ADMIN — PHYTONADIONE 10 MG: 10 INJECTION, EMULSION INTRAMUSCULAR; INTRAVENOUS; SUBCUTANEOUS at 20:32

## 2019-01-01 RX ADMIN — Medication 10 ML: at 19:15

## 2019-01-01 RX ADMIN — LORAZEPAM 1 MG: 2 INJECTION INTRAMUSCULAR; INTRAVENOUS at 06:18

## 2019-01-01 RX ADMIN — LEVETIRACETAM 750 MG: 100 INJECTION, SOLUTION INTRAVENOUS at 23:56

## 2019-01-01 RX ADMIN — Medication 10 ML: at 10:38

## 2019-01-01 RX ADMIN — MORPHINE SULFATE 4 MG: 2 INJECTION, SOLUTION INTRAMUSCULAR; INTRAVENOUS at 10:54

## 2019-01-01 RX ADMIN — NOREPINEPHRINE BITARTRATE 11 MCG/MIN: 1 INJECTION INTRAVENOUS at 06:02

## 2019-01-01 RX ADMIN — SODIUM CHLORIDE 500 ML: 9 INJECTION, SOLUTION INTRAVENOUS at 13:12

## 2019-01-01 RX ADMIN — SODIUM CHLORIDE 250 ML: 9 INJECTION, SOLUTION INTRAVENOUS at 21:34

## 2019-01-01 RX ADMIN — MUPIROCIN: 20 OINTMENT TOPICAL at 22:29

## 2019-01-01 RX ADMIN — SODIUM CHLORIDE 250 ML: 9 INJECTION, SOLUTION INTRAVENOUS at 14:18

## 2019-01-01 RX ADMIN — Medication 10 ML: at 09:00

## 2019-01-01 RX ADMIN — PHYTONADIONE 10 MG: 10 INJECTION, EMULSION INTRAMUSCULAR; INTRAVENOUS; SUBCUTANEOUS at 08:18

## 2019-01-01 RX ADMIN — VANCOMYCIN HYDROCHLORIDE 1250 MG: 10 INJECTION, POWDER, LYOPHILIZED, FOR SOLUTION INTRAVENOUS at 09:14

## 2019-01-01 RX ADMIN — MUPIROCIN: 20 OINTMENT TOPICAL at 20:23

## 2019-01-01 RX ADMIN — INSULIN LISPRO 1 UNITS: 100 INJECTION, SOLUTION INTRAVENOUS; SUBCUTANEOUS at 16:40

## 2019-01-01 RX ADMIN — CLINDAMYCIN PHOSPHATE 900 MG: 900 INJECTION, SOLUTION INTRAVENOUS at 09:28

## 2019-01-01 RX ADMIN — FENTANYL CITRATE 25 MCG: 50 INJECTION INTRAMUSCULAR; INTRAVENOUS at 19:06

## 2019-01-01 RX ADMIN — PIPERACILLIN SODIUM,TAZOBACTAM SODIUM 3.38 G: 3; .375 INJECTION, POWDER, FOR SOLUTION INTRAVENOUS at 05:53

## 2019-01-01 RX ADMIN — Medication 10 ML: at 23:44

## 2019-01-01 RX ADMIN — CLINDAMYCIN PHOSPHATE 900 MG: 900 INJECTION, SOLUTION INTRAVENOUS at 01:54

## 2019-01-01 RX ADMIN — MAGNESIUM SULFATE HEPTAHYDRATE 2 G: 40 INJECTION, SOLUTION INTRAVENOUS at 05:51

## 2019-01-01 RX ADMIN — AMPICILLIN SODIUM AND SULBACTAM SODIUM 3 G: 2; 1 INJECTION, POWDER, FOR SOLUTION INTRAMUSCULAR; INTRAVENOUS at 01:20

## 2019-01-01 RX ADMIN — AMIODARONE HYDROCHLORIDE 0.5 MG/MIN: 50 INJECTION, SOLUTION INTRAVENOUS at 20:33

## 2019-01-01 RX ADMIN — LORAZEPAM 1 MG: 2 INJECTION INTRAMUSCULAR; INTRAVENOUS at 16:05

## 2019-01-01 RX ADMIN — ATROPINE SULFATE 1 DROP: 10 SOLUTION OPHTHALMIC at 02:15

## 2019-01-01 RX ADMIN — MUPIROCIN: 20 OINTMENT TOPICAL at 09:00

## 2019-01-01 RX ADMIN — AMPICILLIN SODIUM AND SULBACTAM SODIUM 3 G: 2; 1 INJECTION, POWDER, FOR SOLUTION INTRAMUSCULAR; INTRAVENOUS at 19:12

## 2019-01-01 RX ADMIN — INSULIN LISPRO 1 UNITS: 100 INJECTION, SOLUTION INTRAVENOUS; SUBCUTANEOUS at 20:32

## 2019-01-01 RX ADMIN — MUPIROCIN: 20 OINTMENT TOPICAL at 22:39

## 2019-01-01 RX ADMIN — ATROPINE SULFATE 1 DROP: 10 SOLUTION OPHTHALMIC at 09:02

## 2019-01-01 RX ADMIN — FOLIC ACID: 5 INJECTION, SOLUTION INTRAMUSCULAR; INTRAVENOUS; SUBCUTANEOUS at 14:12

## 2019-01-01 RX ADMIN — INSULIN LISPRO 1 UNITS: 100 INJECTION, SOLUTION INTRAVENOUS; SUBCUTANEOUS at 08:54

## 2019-01-01 RX ADMIN — CLINDAMYCIN PHOSPHATE 900 MG: 900 INJECTION, SOLUTION INTRAVENOUS at 02:48

## 2019-01-01 RX ADMIN — AMPICILLIN SODIUM AND SULBACTAM SODIUM 3 G: 2; 1 INJECTION, POWDER, FOR SOLUTION INTRAMUSCULAR; INTRAVENOUS at 06:42

## 2019-01-01 RX ADMIN — Medication 10 ML: at 21:00

## 2019-01-01 RX ADMIN — Medication 10 ML: at 20:24

## 2019-01-01 RX ADMIN — ONDANSETRON 4 MG: 2 INJECTION INTRAMUSCULAR; INTRAVENOUS at 19:07

## 2019-01-01 RX ADMIN — PIPERACILLIN SODIUM,TAZOBACTAM SODIUM 3.38 G: 3; .375 INJECTION, POWDER, FOR SOLUTION INTRAVENOUS at 19:06

## 2019-01-01 RX ADMIN — LORAZEPAM 1 MG: 2 INJECTION INTRAMUSCULAR; INTRAVENOUS at 04:15

## 2019-01-01 RX ADMIN — VANCOMYCIN HYDROCHLORIDE 1000 MG: 10 INJECTION, POWDER, LYOPHILIZED, FOR SOLUTION INTRAVENOUS at 09:14

## 2019-01-01 RX ADMIN — INSULIN LISPRO 1 UNITS: 100 INJECTION, SOLUTION INTRAVENOUS; SUBCUTANEOUS at 17:15

## 2019-01-01 RX ADMIN — CLINDAMYCIN PHOSPHATE 900 MG: 900 INJECTION, SOLUTION INTRAVENOUS at 08:18

## 2019-01-01 RX ADMIN — PIPERACILLIN SODIUM,TAZOBACTAM SODIUM 3.38 G: 3; .375 INJECTION, POWDER, FOR SOLUTION INTRAVENOUS at 05:51

## 2019-01-01 RX ADMIN — MUPIROCIN: 20 OINTMENT TOPICAL at 09:29

## 2019-01-01 RX ADMIN — MORPHINE SULFATE 4 MG: 2 INJECTION, SOLUTION INTRAMUSCULAR; INTRAVENOUS at 06:21

## 2019-01-01 RX ADMIN — INSULIN LISPRO 1 UNITS: 100 INJECTION, SOLUTION INTRAVENOUS; SUBCUTANEOUS at 12:09

## 2019-01-01 RX ADMIN — Medication 7.5 G: at 17:30

## 2019-01-01 RX ADMIN — AMIODARONE HYDROCHLORIDE 0.5 MG/MIN: 50 INJECTION, SOLUTION INTRAVENOUS at 21:18

## 2019-01-01 RX ADMIN — HYDROMORPHONE HYDROCHLORIDE 1 MG: 1 INJECTION, SOLUTION INTRAMUSCULAR; INTRAVENOUS; SUBCUTANEOUS at 16:05

## 2019-01-01 RX ADMIN — FAMOTIDINE 20 MG: 20 TABLET ORAL at 22:23

## 2019-01-01 RX ADMIN — MORPHINE SULFATE 4 MG: 2 INJECTION, SOLUTION INTRAMUSCULAR; INTRAVENOUS at 17:13

## 2019-01-01 RX ADMIN — CLINDAMYCIN PHOSPHATE 900 MG: 900 INJECTION, SOLUTION INTRAVENOUS at 17:30

## 2019-01-01 RX ADMIN — DOPAMINE HYDROCHLORIDE 5 MCG/KG/MIN: 160 INJECTION, SOLUTION INTRAVENOUS at 22:12

## 2019-01-01 RX ADMIN — MORPHINE SULFATE 4 MG: 2 INJECTION, SOLUTION INTRAMUSCULAR; INTRAVENOUS at 03:56

## 2019-01-01 RX ADMIN — PIPERACILLIN SODIUM,TAZOBACTAM SODIUM 3.38 G: 3; .375 INJECTION, POWDER, FOR SOLUTION INTRAVENOUS at 05:25

## 2019-01-01 RX ADMIN — CLINDAMYCIN PHOSPHATE 900 MG: 900 INJECTION, SOLUTION INTRAVENOUS at 17:15

## 2019-01-01 ASSESSMENT — ENCOUNTER SYMPTOMS
ABDOMINAL PAIN: 0
CHOKING: 0
EYE DISCHARGE: 0
COUGH: 0
ABDOMINAL PAIN: 0
WHEEZING: 0
DIARRHEA: 0
CHEST TIGHTNESS: 0
STRIDOR: 0
FACIAL SWELLING: 0
SHORTNESS OF BREATH: 0
ABDOMINAL DISTENTION: 0
BACK PAIN: 0
NAUSEA: 0
SHORTNESS OF BREATH: 1
NAUSEA: 0
TROUBLE SWALLOWING: 0
CONSTIPATION: 0
EYE PAIN: 0
PHOTOPHOBIA: 0
VOMITING: 0
SORE THROAT: 0
APNEA: 0
COUGH: 0
VOMITING: 0

## 2019-01-01 ASSESSMENT — PAIN DESCRIPTION - LOCATION
LOCATION: ARM
LOCATION: ARM
LOCATION: BACK
LOCATION: ARM
LOCATION: OTHER (COMMENT)

## 2019-01-01 ASSESSMENT — PAIN SCALES - GENERAL
PAINLEVEL_OUTOF10: 0
PAINLEVEL_OUTOF10: 0
PAINLEVEL_OUTOF10: 2
PAINLEVEL_OUTOF10: 5
PAINLEVEL_OUTOF10: 0
PAINLEVEL_OUTOF10: 0
PAINLEVEL_OUTOF10: 3
PAINLEVEL_OUTOF10: 0
PAINLEVEL_OUTOF10: 0
PAINLEVEL_OUTOF10: 6
PAINLEVEL_OUTOF10: 4
PAINLEVEL_OUTOF10: 10
PAINLEVEL_OUTOF10: 0
PAINLEVEL_OUTOF10: 5
PAINLEVEL_OUTOF10: 0
PAINLEVEL_OUTOF10: 4
PAINLEVEL_OUTOF10: 0
PAINLEVEL_OUTOF10: 5
PAINLEVEL_OUTOF10: 5
PAINLEVEL_OUTOF10: 0
PAINLEVEL_OUTOF10: 10
PAINLEVEL_OUTOF10: 0

## 2019-01-01 ASSESSMENT — PAIN DESCRIPTION - DESCRIPTORS
DESCRIPTORS: ACHING

## 2019-01-01 ASSESSMENT — PAIN DESCRIPTION - PROGRESSION: CLINICAL_PROGRESSION: NOT CHANGED

## 2019-01-01 ASSESSMENT — PAIN DESCRIPTION - PAIN TYPE
TYPE: ACUTE PAIN;CHRONIC PAIN
TYPE: ACUTE PAIN

## 2019-01-01 ASSESSMENT — PAIN DESCRIPTION - ONSET: ONSET: ON-GOING

## 2019-01-01 ASSESSMENT — PAIN - FUNCTIONAL ASSESSMENT: PAIN_FUNCTIONAL_ASSESSMENT: ACTIVITIES ARE NOT PREVENTED

## 2019-01-01 ASSESSMENT — PAIN DESCRIPTION - ORIENTATION
ORIENTATION: RIGHT

## 2019-01-01 ASSESSMENT — PAIN DESCRIPTION - FREQUENCY: FREQUENCY: CONTINUOUS

## 2019-06-28 PROBLEM — A41.9 SEPSIS (HCC): Status: ACTIVE | Noted: 2019-01-01

## 2019-06-28 NOTE — ED PROVIDER NOTES
ED Attending Attestation Note     Date of evaluation: 6/28/2019    This patient was seen by the resident. I have seen and examined the patient, agree with the workup, evaluation, management and diagnosis. The care plan has been discussed. I have reviewed the ECG and concur with the resident's interpretation. My assessment reveals patient here for right knee pain. He also complains of chest pain and shortness of breath and feeling sick. This patient's had a previous strokes is difficult to get a good history from him and he is dysarthric. When he arrived it was noted he is right arm had an abrasion on the inner aspect of the upper arm and then redness swelling to the right arm. Also redness swelling to his right leg. On exam patient's blood pressure one point down into the 60Z systolic. Patient is dysarthric and has limited function of his right side from previous stroke. He does have an abrasion on the inner upper arm and redness distal to this area. No crepitus noted. There is swelling of this hand and forearm. Right leg reveals some mild swelling however this knee did not appear to be red or hot and was not swollen. This patient became hypotensive and there was concern for sepsis. He did have an elevated white count and 22 bands. I was concerned he had a cellulitis to this right arm therefore he started on vancomycin and Zosyn. Also started on the sepsis 30 mL/kg fluids. He does have a history of a low ejection fraction however I spoke with the intensivist in regards to this and he agreed with the fluid bolus of 30 cc/kg. Due to the immediate potential for life-threatening deterioration due to hypotension and sepsis, I spent 35 minutes providing critical care. This time is excluding time spent performing procedures.        Ottoniel Johnson MD  07/02/19 9940

## 2019-06-28 NOTE — ED PROVIDER NOTES
MRSA (methicillin resistant staph aureus) culture positive, Non-healing wound of lower extremity, Non-ischemic cardiomyopathy ([facility] [address] PAD (peripheral artery disease) ([facility] [address] Peripheral neuropathy, Seizure, convulsion ([facility] [address] and Unspecified cerebral artery occlusion with cerebral infarction. He has a past surgical history that includes Knee arthroscopy; Foot Tendon Surgery (Left, 05/24/2013); Toe amputation (Left, 05/21/2013); Cardiac catheterization (01/31/2012); Foot amputation through metatarsal (Right); other surgical history (Left, 05/17/2017); Foot surgery (Left, 10/19/2017); Foot surgery (Right, 04/19/2018); and Foot surgery (Right, 04/25/2018). His family history includes Kidney Disease in his brother and father. He reports that he quit smoking about 26 years ago. His smoking use included cigarettes. He has never used smokeless tobacco. He reports that he drinks alcohol. He reports that he has current or past drug history. Drug: Marijuana. Medications     Current Discharge Medication List      CONTINUE these medications which have NOT CHANGED    Details   levETIRAcetam (KEPPRA) 750 MG tablet Take 1 tablet by mouth 2 times daily  Qty: 60 tablet, Refills: 3      insulin glargine (BASAGLAR KWIKPEN) 100 UNIT/ML injection pen Inject 5 Units into the skin nightly  Qty: 5 pen, Refills: 3      atorvastatin (LIPITOR) 40 MG tablet Take 1 tablet by mouth nightly  Qty: 30 tablet, Refills: 3      fluticasone (FLONASE) 50 MCG/ACT nasal spray 1 spray by Each Nare route daily      tamsulosin (FLOMAX) 0.4 MG capsule Take 0.4 mg by mouth daily      insulin lispro (HUMALOG) 100 UNIT/ML injection vial Inject into the skin 3 times daily (before meals) 0-149=0, 150-199=2, 200-249=2, 250-299=3, 300-349=4, 350-399=5, 400 or above 6 units and call MD      oxyCODONE-acetaminophen (PERCOCET) 5-325 MG per tablet Take 1 tablet by mouth every 6 hours as needed for Pain. .      polyethylene glycol (MIRALAX) PACK packet Take 17 g by LDL Calculated 11 <100 mg/dL    VLDL Cholesterol Calculated 13 Not Established mg/dL   Magnesium   Result Value Ref Range    Magnesium 1.50 (L) 1.80 - 2.40 mg/dL   CBC auto differential   Result Value Ref Range    WBC 11.6 (H) 4.0 - 11.0 K/uL    RBC 5.21 4.20 - 5.90 M/uL    Hemoglobin 13.1 (L) 13.5 - 17.5 g/dL    Hematocrit 41.5 40.5 - 52.5 %    MCV 79.6 (L) 80.0 - 100.0 fL    MCH 25.2 (L) 26.0 - 34.0 pg    MCHC 31.6 31.0 - 36.0 g/dL    RDW 19.9 (H) 12.4 - 15.4 %    Platelets 758 961 - 183 K/uL    MPV 9.1 5.0 - 10.5 fL    PLATELET SLIDE REVIEW Adequate     SLIDE REVIEW see below     Neutrophils % 54.0 %    Lymphocytes % 13.0 %    Monocytes % 4.0 %    Eosinophils % 0.0 %    Basophils % 0.0 %    Neutrophils # 9.6 (H) 1.7 - 7.7 K/uL    Lymphocytes # 1.5 1.0 - 5.1 K/uL    Monocytes # 0.5 0.0 - 1.3 K/uL    Eosinophils # 0.0 0.0 - 0.6 K/uL    Basophils # 0.0 0.0 - 0.2 K/uL    Bands Relative 25 (H) 0 - 7 %    Metamyelocytes Relative 4 (A) %    nRBC 1 (A) /100 WBC    Anisocytosis 2+ (A)     Macrocytes 1+ (A)     Microcytes 2+ (A)     Hypochromia 2+ (A)     Poikilocytes 2+ (A)     Schistocytes 1+ (A)     Emmy Cells 1+ (A)     Target Cells 1+ (A)    Protime-INR   Result Value Ref Range    Protime 27.8 (H) 9.8 - 13.0 sec    INR 2.44 (H) 0.86 - 1.14   Troponin   Result Value Ref Range    Troponin 0.06 (H) <0.01 ng/mL   Troponin   Result Value Ref Range    Troponin 0.06 (H) <0.01 ng/mL   Lactic Acid, Plasma   Result Value Ref Range    Lactic Acid 3.8 (H) 0.4 - 2.0 mmol/L   Lactic Acid, Plasma   Result Value Ref Range    Lactic Acid 4.6 (HH) 0.4 - 2.0 mmol/L   Microscopic Urinalysis   Result Value Ref Range    Mucus, UA 1+ (A) /LPF    WBC, UA 0-2 0 - 5 /HPF    RBC, UA 5-10 (A) 0 - 2 /HPF    Bacteria, UA 1+ (A) /HPF   Renal function panel   Result Value Ref Range    Sodium 128 (L) 136 - 145 mmol/L    Potassium 4.5 3.5 - 5.1 mmol/L    Chloride 96 (L) 99 - 110 mmol/L    CO2 16 (L) 21 - 32 mmol/L    Anion Gap 16 3 - 16    Glucose 115

## 2019-06-28 NOTE — H&P
ICU HISTORY AND PHYSICAL       Hospital Day:   ICU Day:                                                          Code:Prior  Admit Date: 6/28/2019  PCP: Lavonne Ambrocio                                  CC: Back pain, arm swelling     HISTORY OF PRESENT ILLNESS:   Rd Friend 70Y/O Male with PMH of CVA with deficit,  HFrEF echo 15-20% (2019), h/o cocaine use, PAD, CAD s/p stent placement , HTN, and  transmetarrsal amputation presented initially with back pain and right arm swelling. The patient noticed pain and swelling in his right arm since yesterday. The patient also now complaining of SOB, but denies chest pain. He denies any dizziness, cough, chest pain, abdomial pain weakness, or cough. ED Course: Patient was noticed to be in A-fib with RVR  with a systolic BP of 70. Lactate was elevated to 3.1 WBC 13. CXR showed R upper patchy ground glass opacity and cardiomegaly. Patient was received sepsis protocol  30mg/kg bolus and Vancomycin/Zosyn for 1 dose. Blood cultures and UA were sent and are pending. Pro-BNP 34,746 in ED. PAST HISTORY:     Past Medical History:   Diagnosis Date    CAD (coronary artery disease) 2012    minor, per cardiac cath    Diabetes mellitus ([address]     Diabetic infection of left foot ([address] 2013    w/osteomyelitis    H/O cocaine abuse 6/25/2015    History of CVA with residual deficit 6/8/2012    Hypertension     MRSA (methicillin resistant staph aureus) culture positive 05/12/2017; 4/17/18; 4/19/18    toe; R foot; bone     Non-healing wound of lower extremity 07/2016    L great toe    Non-ischemic cardiomyopathy ([address] 2012    per cardiac cath    PAD (peripheral artery disease) ([address] 07/26/2016    L PIPER - 0.6, unable to obtain readings on R    Peripheral neuropathy     Seizure, convulsion ([address] 07/2016    Dr. Antolin Leung - due to scar from old L frontal infarction    Unspecified cerebral artery occlusion with cerebral infarction 6/8/2012       Past Surgical History:

## 2019-06-29 PROBLEM — I48.91 ATRIAL FIBRILLATION WITH RAPID VENTRICULAR RESPONSE (HCC): Status: ACTIVE | Noted: 2019-01-01

## 2019-06-29 NOTE — PROGRESS NOTES
Clinical Pharmacy Progress Note  Pharmacy to dose vancomycin    Admit date: 6/28/2019     Subjective:  Pt is a 70yom who is admitted with acute on chronic HF exacerbation, new onset afib, and sepsis 2/2 R arm cellulitis. PMHx includes HTN, DM 2, CAD, CKD, PAD, cocaine abuse, CVA, seizure, peripheral neuropathy, nonischemic cardiomyopaty, prior Rt TMA, and multiple LLE surgeries for infection    Pharmacy has been consulted by Dr. Jesenia Valdovinos to dose vancomycin. Patient is known to the clinical pharmacy team from prior admissions. In May 2017, patient was on Vancomycin 1.25g IV q18h, which resulted in troughs of 13.8 & 14.1mcg/mL (SCr ~ 1 during that admission). In April 2018, patient was on Vancomycin 1.5g IV q24h, resulting in troughs of 13.1 & 16 mcg/mL (Scr ~1.3 during that admission). Current antibiotic regimen:   Zosyn 3.375g IV q8h -- day #2  Vancomycin -- pharmacy to dose -- day #2    1.5g IV x 1 in the ED 6/28    Intermittent via levels      Date: Vancomycin level: Vancomycin Dose:    6/28  1.5g   6/29 9.5 Ordered 1.5g          Objective:  Lab Results   Component Value Date    INR 2.44 (H) 06/29/2019       Recent Labs     06/29/19  0319   WBC 11.6*   HGB 13.1*   HCT 41.5   MCV 79.6*          Recent Labs     06/29/19  0318   *   K 4.5   CL 96*   CO2 16*   PHOS 3.0   BUN 37*   CREATININE 1.7*       Estimated Creatinine Clearance: 46 mL/min (A) (based on SCr of 1.7 mg/dL (H)). Micro:  Date: Source: Micro:   6/28 R arm wound 1+ GPC   6/28 Blood             Assessment/Plan:    1. Sepsis - R arm cellulitis   Vancomycin -- pharmacy to dose:  · Currently scheduled as 1.5g IV q24h. · Given MADYSON today with Scr 1.7, will stop scheduled dosing, and change to intermittent dosing via levels. Ordered a random level for this morning. Will re-dose if this level is < 15-18 mcg/mL. · Goal range for cellulitis with hx of MRSA (HALEY = 2) is ~15 mcg/mL. · Will follow and update this note with plan.

## 2019-06-29 NOTE — CONSULTS
History of Present Illness:  Rd Friend is a 78 y.o. patient whom we were asked by the hospitalists to see hypotension/sepsis/chf/cardiomyopathy/afib. Presents with complaints of arm swelling. Swelling in right arm beginning day PTA. Hx of sob. No hx chest pain. No cough, weakness, abd pain. ER noted bp syst 70 with elevated lactate and elevated WBC. Noted possible cellulitis right arm. Also noted afib with RVR. Received volume challenge. Due to lack of access tried femoral access but unable to perform due to pt movement. BP remains low. Felt to be sepsis due to cellulitis right arm. Question of cardiogenic. Past Medical History:   has a past medical history of CAD (coronary artery disease), Cardiogenic shock ([address] Diabetes mellitus ([address] Diabetic infection of left foot ([address] H/O cocaine abuse, History of CVA with residual deficit, Hypertension, MRSA (methicillin resistant staph aureus) culture positive, Non-healing wound of lower extremity, Non-ischemic cardiomyopathy ([address] PAD (peripheral artery disease) ([address] Peripheral neuropathy, Seizure, convulsion ([address] and Unspecified cerebral artery occlusion with cerebral infarction. Surgical History:   has a past surgical history that includes Knee arthroscopy; Foot Tendon Surgery (Left, 05/24/2013); Toe amputation (Left, 05/21/2013); Cardiac catheterization (01/31/2012); Foot amputation through metatarsal (Right); other surgical history (Left, 05/17/2017); Foot surgery (Left, 10/19/2017); Foot surgery (Right, 04/19/2018); and Foot surgery (Right, 04/25/2018). Social History:   reports that he quit smoking about 26 years ago. His smoking use included cigarettes. He has never used smokeless tobacco. He reports that he drinks alcohol. He reports that he does not use drugs. Family History:  No evidence for sudden cardiac death or premature CAD    Home Medications:  Were reviewed and are listed in nursing record.  and/or listed below  Prior to Admission medications    Medication Sig Start Date End Date Taking? Authorizing Provider   levETIRAcetam (KEPPRA) 750 MG tablet Take 1 tablet by mouth 2 times daily 9/2/18   Wendi Carrel, MD   insulin glargine [facility]) 100 UNIT/ML injection pen Inject 5 Units into the skin nightly 9/2/18   Wendi Carrel, MD   atorvastatin (LIPITOR) 40 MG tablet Take 1 tablet by mouth nightly 9/2/18   Wendi Carrel, MD   fluticasone [facility]) 50 MCG/ACT nasal spray 1 spray by Each Nare route daily    Historical Provider, MD   tamsulosin (FLOMAX) 0.4 MG capsule Take 0.4 mg by mouth daily    Historical Provider, MD   insulin lispro (HUMALOG) 100 UNIT/ML injection vial Inject into the skin 3 times daily (before meals) 0-149=0, 150-199=2, 200-249=2, 250-299=3, 300-349=4, 350-399=5, 400 or above 6 units and call MD    Historical Provider, MD   oxyCODONE-acetaminophen (PERCOCET) 5-325 MG per tablet Take 1 tablet by mouth every 6 hours as needed for Pain. Sandra Olea Historical Provider, MD   polyethylene glycol (MIRALAX) PACK packet Take 17 g by mouth daily    Historical Provider, MD   finasteride (PROSCAR) 5 MG tablet Take 5 mg by mouth daily    Historical Provider, MD   amLODIPine (NORVASC) 5 MG tablet Take 1 tablet by mouth daily 11/10/17   Rizwan An MD   aspirin 81 MG chewable tablet Take 1 tablet by mouth daily 7/21/17   ORTEGA Dorsey - CNP   busPIRone (BUSPAR) 10 MG tablet Take 10 mg by mouth daily     Historical Provider, MD        Allergies:  Patient has no known allergies.      Review of Systems:   · Unable, lethargic, CVA, Dementia    Physical Examination:    Vitals:    06/29/19 0845   BP:    Pulse: 118   Resp: 22   Temp:    SpO2:     Weight: 208 lb 12.4 oz (94.7 kg)         General Appearance:  Restless, appears stated age   Head:  Normocephalic, without obvious abnormality, atraumatic   Eyes:  PERRL, conjunctiva/corneas clear       Nose: Nares normal, no drainage or sinus tenderness   Throat: Lips, mucosa, and tongue  PAD (peripheral artery disease) ([facility])    Seizure, convulsion ([address]    Diabetic infection of left foot ([address]    Non-ischemic cardiomyopathy ([address]    Non-healing wound of lower extremity    Osteomyelitis of left foot ([address]    Ischemic ulcer diabetic foot ([address]    Seizure disorder ([facility])    Elevated troponin    CKD (chronic kidney disease) stage 3, GFR 30-59 ml/min ([facility])    Toe osteomyelitis, left ([facility])    Critical lower limb ischemia    S/P angioplasty with stent    Subacute osteomyelitis of right foot ([address]    Osteomyelitis of right foot ([address]    Coronary artery disease involving native coronary artery of native heart without angina pectoris    Pure hypercholesterolemia    Ulcer of right heel, with fat layer exposed ([address]    Cellulitis    Left leg cellulitis    Altered mental status    Sepsis ([address]    Atrial fibrillation with rapid ventricular response ([address]         Plan:    Hypotension likely due to sepsis. Will try dopamine since access an issue. More likely sepsis than cardiogenic since would likely be in pulmonary edema if cardiogenic and only requiring 1-2 l O2. Atbx. Supportive care. Trop elevated but in face of elevated cr. Also steady state suggesting same. Afib with RVR on presentation. Agree with amio gtt. HR reasonable on gtt. INR elevated, uncertain cause. Would not necessarily add other AC agent since appears auto anticoagulated. Would hold on diuresis for now. Consider volume challenge. Continue to monitor.

## 2019-06-29 NOTE — CONSULTS
Clinical Pharmacy Consult Note    Admit date: 6/28/2019    Subjective/Objective:  77 yo female with hx of cocaine abuse (urine positve for cocaine), Coagulopathy (INR of 2.44 w/o anticoag), CHF, CVA, CKD stage III, DM2, CAD s/p PCI, HTN is admitted with acute on chronic exacerbation of systolic heart failure and sepsis likely due to right arm cellulitis. Pharmacy is consulted to dose Vancomycin per Dr. Michelle Fitzgerald    Pertinent Medications:  Zosyn 3.375 g every 8 hours - day #2  Vancomycin 1500 mg IVx1    PERTINENT LABS:  Recent Labs     06/28/19 1819   *   K 4.4   CL 97*   CO2 20*   BUN 35*   CREATININE 1.7*       Estimated Creatinine Clearance: 46 mL/min (A) (based on SCr of 1.7 mg/dL (H)). Recent Labs     06/28/19 1819 06/29/19  0319   WBC 13.0* 11.6*   HGB 11.9* 13.1*   HCT 37.4* 41.5   MCV 78.4* 79.6*    155       Height:  6' 1\" (185.4 cm)  Weight: 208 lb 12.4 oz (94.7 kg)    Micro: Wound and blood cxs are pending    Assessment/Plan:  Vancomycin  · Following vancomycin 1500 mg IVx1 dose, will continue therapy with same dose of vancomycin (~16 mg/kg) Clinical pharmacist will follow-up in AM.  · Renal function will be monitored closely and dosing will be adjusted as appropriate. Please call with any questions. Thank you for consulting pharmacy!   Marichuy Mcmahan Rph    6/29/2019 4:51 AM

## 2019-06-29 NOTE — CONSULTS
[facility] 2016    L PIPER - 0.6, unable to obtain readings on R    Peripheral neuropathy     Seizure, convulsion ([facility] 2016    Dr. Kat Leung - due to scar from old L frontal infarction    Unspecified cerebral artery occlusion with cerebral infarction 2012       Past Surgical History:   Procedure Laterality Date    CARDIAC CATHETERIZATION  2012    Dr. Kellen Mosher Left 10/19/2017     INCISION AND DRAINAGE WITH AMPUTATION OF 2ND DIGIT LEFT FOOT    FOOT SURGERY Right 2018    RIGHTFOOT DEBRIDEMENT INCISION AND DRAINAGE WITH 5TH RAY RESECTION    FOOT SURGERY Right 2018    RIGHT TIBIALIS TENDON TRANSFER, REPEAT INCISION AND DRAINAGE    FOOT TENDON SURGERY Left 2013    , DPM - I&D to antebellum cortex w/wound closure, perineal tendon transfer    KNEE ARTHROSCOPY      OTHER SURGICAL HISTORY Left 2017    INCISION AND DRAINAGE, AMPUTATION OF HALLUX LEFT FOOT    TOE AMPUTATION Left 2013    Dr. Sergei Zaman, DPM - I&D to bone cortex w/amputation of 5th metatarsal       Social History     Substance and Sexual Activity   Alcohol Use Yes    Comment: 2013, drinks every so often (cold beer, or wine)     Social History     Substance and Sexual Activity   Drug Use Yes    Types: Marijuana     Social History     Tobacco Use   Smoking Status Former Smoker    Types: Cigarettes    Last attempt to quit: 1993    Years since quittin.3   Smokeless Tobacco Never Used   Tobacco Comment    2013 smokes a cigarette or cigar once or twice a year          MEDICATIONS:     No current facility-administered medications on file prior to encounter.       Current Outpatient Medications on File Prior to Encounter   Medication Sig Dispense Refill    levETIRAcetam (KEPPRA) 750 MG tablet Take 1 tablet by mouth 2 times daily 60 tablet 3    insulin glargine (BASAGLAR KWIKPEN) 100 UNIT/ML injection pen Inject 5 Units into the Allergies      PHYSICAL EXAM:       Vitals: BP (!) 58/31   Pulse 123   Temp 97 °F (36.1 °C) (Axillary)   Resp 26   Ht 6' 1\" (1.854 m)   Wt 208 lb 12.4 oz (94.7 kg)   SpO2 100%   BMI 27.54 kg/m²     I/O:      Intake/Output Summary (Last 24 hours) at 6/29/2019 1030  Last data filed at 6/29/2019 1000  Gross per 24 hour   Intake 1624 ml   Output 406 ml   Net 1218 ml     I/O this shift:  In: -   Out: 201 [Urine:201]  I/O last 3 completed shifts: In: 7918 [P.O.:240; I.V.:284; IV Piggyback:1100]  Out: 205 [Urine:205]    Physical Examination:   Constitutional: He is oriented to person, place, and time. HENT:   Head: Normocephalic and atraumatic. Eyes: Pupils are equal, round, and reactive to light. Conjunctivae are normal.   Neck: Normal range of motion. No JVD present. Pulmonary/Chest: increased WOB with mild bilateral crackles    Abdominal: Soft. Bowel sounds are normal. There is no tenderness. Musculoskeletal: He exhibits edema. 2-3+ pitting edema    Neurological: He is alert and oriented to person, place, and time. Skin: Skin is warm.  R arm swollen, erythematous, TTP, weeping   · Lines: REJ, L fem CVC     Access:   -Central Access Day #: 1                                  -Peripheral Access Day#:1  -Arterial line Day#:1                                  Cuellar Day#:1               Recent Labs     06/29/19  0511 06/29/19  0942   PHART 7.392 7.387   [id number] 20.9* 25.4*   PO2ART 94.0 75.6           DATA:       Labs:  CBC:   Recent Labs     06/28/19 1819 06/29/19 0319   WBC 13.0* 11.6*   HGB 11.9* 13.1*   HCT 37.4* 41.5    155       BMP:   Recent Labs     06/28/19 1819 06/29/19 0318   * 128*   K 4.4 4.5   CL 97* 96*   CO2 20* 16*   BUN 35* 37*   CREATININE 1.7* 1.7*   GLUCOSE 98 115*     LFT's:   Recent Labs     06/28/19  1819   AST 28   ALT 11   BILITOT 3.8*   ALKPHOS 92     Troponin:   Recent Labs     06/28/19  1819 06/28/19  2225 06/29/19  0319   TROPONINI 0.07* 0.06* 0.06*     BNP: No

## 2019-06-29 NOTE — PROCEDURES
Patient was prepped and draped in the usual sterile fashion. Indications: hemodynamic monitoring  Location: left radial  Anesthesia: local infiltration    Anesthesia:  Local Anesthetic: lidocaine 1% without epinephrine  Anesthetic total: 3 mL    Sedation:  Patient sedated: no    Barrett's test normal: yes  Needle gauge: 20  Seldinger technique: Seldinger technique used  Cutdown: cutdown required  Number of attempts: 1  Post-procedure: line sutured  Post-procedure CMS: normal  Patient tolerance: Patient tolerated the procedure well with no immediate complications          Shirin Loyd MD  6/29/2019    I was present, available and supervised the placement of this central venous catheter.     Jovani Blair MD

## 2019-06-29 NOTE — PROGRESS NOTES
Pt unable to take PO meds. When offered water, pt blows into straw/cup despite re-direction. ICU resident Dr. Yoseph Lucia notified of above. Will continue to monitor.

## 2019-06-29 NOTE — FLOWSHEET NOTE
Pt admitted from ED at 2040 with sepsis acute on chronic CHF alert & oriented x2 follow commands right sided weakness r/t hx recent CVA afebrile HR 120s afib rvr /80 RR 27 sating 100% at 2 liters O2 via NC assessment complete orders in place for amiodarone & lasix gtt.

## 2019-06-29 NOTE — FLOWSHEET NOTE
4 Eyes Admission Assessment     I agree as the admission nurse that 2 RN's have performed a thorough Head to Toe Skin Assessment on the patient. ALL assessment sites listed below have been assessed on admission. Areas assessed by both nurses: ***  [x]   Head, Face, and Ears   [x]   Shoulders, Back, and Chest  [x]   Arms, Elbows, and Hands   [x]   Coccyx, Sacrum, and Ischum  [x]   Legs, Feet, and Heels        Does the Patient have Skin Breakdown?   Yes a wound was noted on the Admission Assessment and an LDA was Initiated documentation include the Candida-wound, Wound Assessment, Measurements, Dressing Treatment, Drainage, and Color\",         Cruz Prevention initiated:  Yes   Wound Care Orders initiated:  NA      WOC nurse consulted for Pressure Injury (Stage 3,4, Unstageable, DTI, NWPT, and Complex wounds):  Yes      Nurse 1 eSignature: Electronically signed by Rhona Alvarado RN on 6/29/19 at 2:44 AM    **SHARE this note so that the co-signing nurse is able to place an eSignature**    Nurse 2 eSignature: {Esignature:[id number]}

## 2019-06-29 NOTE — FLOWSHEET NOTE
#16 & # 18 fr talbot insertion attempted unsuccessfuly charge nurse notified coude #16 fr inserted without difficulties 120 ml returned urine specimen collected & sent for urinalysis reflex to culture & urine drug screen right arm wound swap culture collected & sent.

## 2019-06-30 NOTE — PROGRESS NOTES
Department of Pharmacy    Notification received from laboratory of positive blood culture results. Organism(s) detected: Streptococcus pyogenes   Applicable Antimicrobial Resistance Genes: None at this point    Recommendation changing empiric antibiotics to: Ampicillin 2 gm every 4 hours + Clindamycin 900 mg every 8 hours    Recommendations reviewed with Dr. Mane Munguia and would prefer to add Clindamycin 900 mg TID to current abx therapy. Total abx descalation may follow later. Thanks!   Eddie Garrido Rph

## 2019-06-30 NOTE — CONSULTS
50 mL IVPB Q8H   0.9 % sodium chloride infusion Continuous   magnesium sulfate 1 g in dextrose 5% 100 mL IVPB PRN   vancomycin (VANCOCIN) 1,000 mg in dextrose 5 % 250 mL IVPB Once   piperacillin-tazobactam (ZOSYN) 3.375 g in dextrose 5 % 100 mL IVPB extended infusion (mini-bag) Q8H   vancomycin (VANCOCIN) intermittent dosing (placeholder) See Admin Instructions   DOPamine (INTROPIN) 400 mg in dextrose 5 % 250 mL infusion Continuous   norepinephrine (LEVOPHED) 16 mg in dextrose 5 % 250 mL infusion Continuous   sodium chloride flush 0.9 % injection 10 mL 2 times per day   sodium chloride flush 0.9 % injection 10 mL PRN   ondansetron (ZOFRAN) injection 4 mg Q6H PRN   famotidine (PEPCID) tablet 20 mg Daily   amiodarone (CORDARONE) 450 mg in dextrose 5 % 250 mL infusion Continuous   [Held by provider] apixaban (ELIQUIS) tablet 5 mg BID   glucose (GLUTOSE) 40 % oral gel 15 g PRN   dextrose 50 % IV solution PRN   glucagon (rDNA) injection 1 mg PRN   dextrose 5 % solution PRN   insulin lispro (HUMALOG) injection pen 0-6 Units TID WC   insulin lispro (HUMALOG) injection pen 0-3 Units Nightly   aspirin chewable tablet 81 mg Daily       Family History:   Family History   Problem Relation Age of Onset    Kidney Disease Father     Kidney Disease Brother        Social History:   TOBACCO:   reports that he quit smoking about 26 years ago. His smoking use included cigarettes. He has never used smokeless tobacco.  ETOH:   reports that he drinks alcohol. DRUGS:   reports that he has current or past drug history. Drug: Marijuana. ROS: A 14 point review of systems was conducted, significant findings as noted in HPI. All other systems negative.     Physical exam:    Vitals:    06/30/19 0715 06/30/19 0730 06/30/19 0745 06/30/19 0800   BP: 96/72 96/62 113/72 100/74   Pulse: 105 105 105 106   Resp: 26 25 20 (!) 31   Temp:    99 °F (37.2 °C)   TempSrc:    Rectal   SpO2:    97%   Weight:       Height:           General appearance:

## 2019-06-30 NOTE — CONSULTS
Admit Date: 6/28/2019  Day: 3  Vent Day: None  IV Access: Fem line. No peripheral lines  IV Fluids: Normal Saline  Vasopressors: Dopamine and Levophed                Antibiotics: Clindamycin, Zosyn, Vanc  Diet: Diet NPO Effective Now     CC: Back pain and right arm swelling  Unable to get history from patient as he is unable to answer questions but does follow some space commands    Space pt is a 70Y/O Male with PMH of CVA with residual speech impairment, HFrEF 15-20% (2019), h/o IV cocaine use, PAD, CAD s/p stent placement , HTN, and transmetarrsal amputation who presented initially with back pain, right arm swelling x2days and intermittent SOB. In the ED, patient was found to have A-fib RVR with a systolic BP of 70, an elevated lactate to 3.1, WBC 13 and Pro-BNP 34,7460. CXR showed R upper patchy ground glass opacity and cardiomegaly. Patient received sepsis protocol IVFs 30mg/kg bolus and Vancomycin/Zosyn for 1 dose. Blood cultures and UA were sent. BP and UO were initially low so was placed on dopamine w/ improvement in both.     Suddenly RUE appears black w/ the skin  sloughing off.   No history of trauma to the extremity     Medications:      Scheduled Meds:  Scheduled Medications    clindamycin (CLEOCIN) IV  900 mg Intravenous Q8H    vancomycin  1,000 mg Intravenous Once    mupirocin   Each Nostril BID    piperacillin-tazobactam  3.375 g Intravenous Q8H    vancomycin (VANCOCIN) intermittent dosing (placeholder)   Other See Admin Instructions    sodium chloride flush  10 mL Intravenous 2 times per day    famotidine  20 mg Oral Daily    [Held by provider] apixaban  5 mg Oral BID    insulin lispro  0-6 Units Subcutaneous TID     insulin lispro  0-3 Units Subcutaneous Nightly    aspirin  81 mg Oral Daily         Continuous Infusions:  Infusions Meds    sodium chloride 100 mL/hr at 06/30/19 0136    DOPamine 5 mcg/kg/min (06/30/19 0825)    norepinephrine 15 mcg/min (06/30/19 0430)    amiodarone

## 2019-06-30 NOTE — PLAN OF CARE
Problem: Falls - Risk of:  Goal: Will remain free from falls  Description  Will remain free from falls  Outcome: Ongoing  Note:   Pt is a fall risk. Fall risk protocol in place. See Benna Hunnewell Fall Score. Pt bed is in low position, bed alarm is on, side rails up, fall risk bracelet applied, non-skid footwear in use. Patient/family educated on fall risk protocol, instructed to call for assistance when needed and belongings are in reach. Will continue with hourly rounds for po intake, pain needs, toileting and repositioning as needed. Will continue to monitor for needs. Goal: Absence of physical injury  Description  Absence of physical injury  Outcome: Ongoing     Problem: Cardiac Output - Decreased:  Goal: Hemodynamic stability will improve  Description  Hemodynamic stability will improve  Outcome: Ongoing     Problem: OXYGENATION/RESPIRATORY FUNCTION  Goal: Patient will maintain patent airway  Outcome: Ongoing  Goal: Patient will achieve/maintain normal respiratory rate/effort  Description  Respiratory rate and effort will be within normal limits for the patient  Outcome: Ongoing     Problem: HEMODYNAMIC STATUS  Goal: Patient has stable vital signs and fluid balance  Outcome: Ongoing  Note:   Pt on 5mcg Dopamine, responding well with increased OU. Will continue to monitor. Problem: FLUID AND ELECTROLYTE IMBALANCE  Goal: Fluid and electrolyte balance are achieved/maintained  Outcome: Ongoing     Problem: ACTIVITY INTOLERANCE/IMPAIRED MOBILITY  Goal: Mobility/activity is maintained at optimum level for patient  Outcome: Ongoing     Problem: Risk for Impaired Skin Integrity  Goal: Tissue integrity - skin and mucous membranes  Description  Structural intactness and normal physiological function of skin and  mucous membranes. Outcome: Ongoing  Note:   PT has skin tear, intact blisters and swelling to RUE. Pt has swelling to RLE. Extremities are cold. Sacrum is intact. Healed amputations to R/L foot/toes.  Heels
diminished. Periods of tachypnea up to 30BPM. Will continue to monitor. Problem: HEMODYNAMIC STATUS  Goal: Patient has stable vital signs and fluid balance  Outcome: Ongoing     Problem: FLUID AND ELECTROLYTE IMBALANCE  Goal: Fluid and electrolyte balance are achieved/maintained  Outcome: Ongoing     Problem: ACTIVITY INTOLERANCE/IMPAIRED MOBILITY  Goal: Mobility/activity is maintained at optimum level for patient  Outcome: Ongoing  Note:   Pt remains on complete bedrest.      Problem: Risk for Impaired Skin Integrity  Goal: Tissue integrity - skin and mucous membranes  Description  Structural intactness and normal physiological function of skin and  mucous membranes. 6/30/2019 1042 by Sameer Ochoa RN  Outcome: Ongoing  Note:   Pt skin is cool, swollen and weeping with the exception of RUE which is reddened, warm, swollen and weeping with blackened skin sloughing from ruptured bullae. Copious serosanginous drainage from RUE, soaking a chux approx every 2hours. skin tear noted at RLE. Cleaned and mepilex applied. Sacral heart in place for prevention. Sacrum is intact. interdry to skin folds. Heels elevated off of bed. Turning and repositioning every 2 hours with pillow support. Will continue to monitor. 6/30/2019 0249 by Fannie Simons RN  Outcome: Ongoing  Note:   At risk for skin breakdown 2/2 decreased mobility & decreased loc pt turned & repositioned q 2hr preventive sacral heart dsg in place skin intact underneath heels elevated off bed will continue to offset pressure points at all times. Problem: Restraint Use - Nonviolent/Non-Self-Destructive Behavior:  Goal: Absence of restraint indications  Description  Absence of restraint indications  6/30/2019 1042 by Sameer Ochoa RN  Outcome: Ongoing  Note:   Restraints continued this shift to promote safety, maintain lines and tubes. Please see flowsheets. Will continue to monitor.   6/30/2019 0249 by Fannie Simons RN  Outcome: Ongoing  Note:   Pt
prevention precautions in place urinary catheter care with CHG wipes per unit protocol.

## 2019-06-30 NOTE — FLOWSHEET NOTE
Additional 250 ml normal saline bolus given for low urine output current map 60 levophed continues at 10 mcg/min dopamine upped to 5 mcg/kg/min will titrate pressors keeping map > 65.

## 2019-06-30 NOTE — PROGRESS NOTES
Alejandra Daily Progress Note      Admit Date:  6/28/2019    Subjective:  Mr. Navarrete Beat awakens, lethargic. Not really answering questions.      Objective:   BP 88/68   Pulse 107   Temp 99.5 °F (37.5 °C) (Rectal)   Resp (!) 33   Ht 6' 1\" (1.854 m)   Wt 219 lb 12.8 oz (99.7 kg)   SpO2 93%   BMI 29.00 kg/m²       Intake/Output Summary (Last 24 hours) at 6/30/2019 0705  Last data filed at 6/30/2019 0600  Gross per 24 hour   Intake 2707.26 ml   Output 701 ml   Net 2006.26 ml       TELEMETRY: Sinus     Physical Exam:  General:  Awakens, very lethargic, not really answering questions, NAD  Skin:  Warm and dry  Neck:  JVP difficult, supple  Chest:  Decreased BS, respiration normal  Cardiovascular:  RRR S1S2, distant  Abdomen:  Soft , quiet, nondistended  Extremities:  Cool, + edema    Medications:    clindamycin (CLEOCIN) IV  900 mg Intravenous Q8H    piperacillin-tazobactam  3.375 g Intravenous Q8H    vancomycin (VANCOCIN) intermittent dosing (placeholder)   Other See Admin Instructions    sodium chloride flush  10 mL Intravenous 2 times per day    famotidine  20 mg Oral Daily    [Held by provider] apixaban  5 mg Oral BID    insulin lispro  0-6 Units Subcutaneous TID WC    insulin lispro  0-3 Units Subcutaneous Nightly    aspirin  81 mg Oral Daily      sodium chloride 100 mL/hr at 06/30/19 0136    DOPamine 3 mcg/kg/min (06/30/19 0600)    norepinephrine 15 mcg/min (06/30/19 0430)    amiodarone 0.5 mg/min (06/29/19 2033)    dextrose       magnesium sulfate, sodium chloride flush, ondansetron, glucose, dextrose, glucagon (rDNA), dextrose    Lab Data:  CBC:   Recent Labs     06/28/19  1819 06/29/19  0319 06/30/19  0355   WBC 13.0* 11.6* 21.2*   HGB 11.9* 13.1* 12.6*   HCT 37.4* 41.5 39.3*   MCV 78.4* 79.6* 78.6*    155 141     BMP:   Recent Labs     06/29/19  0318 06/29/19  1423 06/30/19  0355   * 128* 126*   K 4.5 4.5 5.1   CL 96* 96* 101   CO2 16* 15* 11*   PHOS 3.0 3.5 4.0   BUN

## 2019-07-01 NOTE — PROGRESS NOTES
CHOL 42   HDL 18*     ABGs:    Recent Labs     06/29/19  0511 06/29/19  0942 07/01/19  0644   PHART 7.392 7.387 7.372   ZKE9WNZ 20.9* 25.4* 27.2*   PO2ART 94.0 75.6 71.3*   RJY2FHI 12.7* 15.3* 15.8*   BEART -12* -10* -9*   V1BKROFT 98 95 94   NPO8NHS 13 16 17       INR:   Recent Labs     06/30/19  0356 07/01/19  0308 07/01/19  1245   INR 3.10* 1.96* 1.90*     Lactate:   Recent Labs     06/29/19  1404 06/29/19  1559 07/01/19  0644   LACTATE 3.12* 3.24* 2.68*     Cultures:  -----------------------------------------------------------------  RAD:   US ABDOMEN LIMITED   Final Result   IMPRESSION :      Significant study limitations due to patient condition, and prominent bowel gas. Sonographic signs findings of cirrhosis. Nonvisualization of the gallbladder and pancreas. CT HAND RIGHT WO CONTRAST   Final Result      Soft tissue swelling identified involving the right forearm and arm. However no subcutaneous gas is identified to suggest necrotizing fasciitis. No acute osseous process identified. No subcutaneous fluid collections are appreciated. CT RADIUS ULNA RIGHT WO CONTRAST   Final Result      Soft tissue swelling identified involving the right forearm and arm. However no subcutaneous gas is identified to suggest necrotizing fasciitis. No acute osseous process identified. No subcutaneous fluid collections are appreciated. CT HUMERUS RIGHT WO CONTRAST   Final Result      Soft tissue swelling identified involving the right forearm and arm. However no subcutaneous gas is identified to suggest necrotizing fasciitis. No acute osseous process identified. No subcutaneous fluid collections are appreciated.       XR CHEST PORTABLE   Final Result      Patchy groundglass opacity in the right upper lung                  XR KNEE RIGHT (1-2 VIEWS)   Final Result      Moderate to severe degenerative changes      XR ELBOW RIGHT (2 VIEWS)   Final Result      No acute bony pathology      XR RADIUS ULNA RIGHT (2 VIEWS)   Final Result      No acute bony pathology            Assessment/Plan:     Bernard Mendez is a 78 y.o. male,  PMH of CVA,  HFrEF echo 15-20% (2019) , CKD 3 , h/o cocaine use, PAD, CAD s/p stent placement , HTN, and  transmetarrsal amputation was admitted from ED to ICU with initial complaints of back pain and R arm pain for the last day. Patient was found to be in A Fib RVR and decreased systolic BP to 70 with a BNP of 34,746 elevated WBC and lactate of 3.1.        Sepsis likely 2/2 to Right arm cellulitis: Patient increased lactate to 9.79, WBC 13, Systolic BP 27DILP. Received 30ml/kg NS in ED and Vanc/Zosn for 1 dose. -Continue Vanc/ Zosyn, pharmacy to dose Vancomycin   -Add Clindamycin   -Blood culture  -Wound culture  -     Acute on chronic CHF: Patient with SOB, ProBNP 34,746. Echo 15-20% (2019) decreased from 40% in 2018.   -Lasix gtt 10mg/hr  -Held home amlodipine   -Strict I/O   -Daily weights  -BNP  -Consult Cardiology      A-Fib RVR: EKG  -170 in ED. [id number]  -Consult to Cardiology, appreciate their recommendations        DM II: BG on admission 81  -Low dose Insulin SS   -Held home insulin        Seizure Disorder   -Continue Keppra     BPH:  -Continue Tamsulosin     CAD:    -ASA 81mg      HTN: Currently hypotensive MAP 68mmHg  -Holding BP medications until BP improves      Coagulopathy: Patient has increased aPTT/ PT and INR to 2.53 not on anticoagulation.  -Hepatitis screening panel  -Repeat INR        Code Status: Full  FEN: Renal Diet, Carb Control   PPX:  Holding Anticoagulation d/t high INR   DISPO: ICU         Ekta Comer MD, PGY-1  07/01/19  4:00 PM    This patient has been staffed and discussed with Chucho Mahajan MD.     Pulmonary/critical care    Patient seen and examined. I agree with Dr. Zafar Rodriguez history, physical, lab findings, assessment and plan. ECHO:   Left ventricular cavity size is normal. There is asymmetric hypertrophy of  the septum.  Overall left

## 2019-07-01 NOTE — PROGRESS NOTES
730am bedside report received from Anthony. Pt awakens to name, oriented to name and  only. sats 98% on room air, At fib rate 91 with Amnio at 0.5, resp 17/min bp 97/61 Map 71 with dopamine at 5 mcg/kg/min and Levophed at 11 mcg/min (will wean/titrate to keep map >65 see flowsheet). Left groin triple lumen noted, right rm wrap noted, talbot patent with clear jessica urine, repositioned to right side, pt moans with repositioning. Checked with lab about renal they will add on see results. Temp 36.3 rectal will monitor.      Lee Prado RN

## 2019-07-01 NOTE — CONSULTS
Scale:  60% ? Ambulation reduced; Significant disease; Can't do hobbies/housework; intake normal   or reduced; occasional assist; LOC full/confusion  50% ? Mainly sit/lie; Extensive disease; Can't do any work; Considerable assist; intake normal  Or reduced; LOC full/confusion  40% ? Mainly in bed; Extensive disease; Mainly assist; intake normal or reduced; occasional assist; LOC full/confusion  30% ? Bed Bound; Extensive disease; Total care; intake reduced; LOC full/confusion  20% ? Bed Bound; Extensive disease; Total care; intake minimal; Drowsy/coma  10% ? Bed Bound; Extensive disease; Total care; Mouth care only; Drowsy/coma  0 ?  Death    PPS: 30%    Vitals:    /61   Pulse 91   Temp 97.3 °F (36.3 °C) (Rectal)   Resp 27   Ht 6' 1\" (1.854 m)   Wt 223 lb 15.8 oz (101.6 kg)   SpO2 100%   BMI 29.55 kg/m²     DATA:    CBC with Differential:    Lab Results   Component Value Date    WBC 31.1 07/01/2019    RBC 5.02 07/01/2019    HGB 12.6 07/01/2019    HCT 39.2 07/01/2019     07/01/2019    MCV 78.0 07/01/2019    MCH 25.0 07/01/2019    MCHC 32.1 07/01/2019    RDW 19.5 07/01/2019    NRBC 1 06/29/2019    SEGSPCT 62.7 05/24/2013    BANDSPCT 8 07/01/2019    METASPCT 1 07/01/2019    LYMPHOPCT 9.0 07/01/2019    PROMYELOPCT 1 06/28/2019    MONOPCT 1.0 07/01/2019    EOSPCT 8.0 05/31/2018    BASOPCT 0.0 07/01/2019    MONOSABS 0.3 07/01/2019    LYMPHSABS 2.8 07/01/2019    EOSABS 0.0 07/01/2019    BASOSABS 0.0 07/01/2019     BMP:    Lab Results   Component Value Date     07/01/2019    K 4.3 07/01/2019    K 4.4 06/28/2019    CL 93 07/01/2019    CO2 17 07/01/2019    BUN 47 07/01/2019    LABALBU 2.0 07/01/2019    CREATININE 1.7 07/01/2019    CALCIUM 7.5 07/01/2019    GFRAA 48 07/01/2019    GFRAA 106 05/24/2013    LABGLOM 40 07/01/2019    GLUCOSE 193 07/01/2019     Albumin:    Lab Results   Component Value Date    LABALBU 2.0 07/01/2019         Conclusion/Time spent:         Recommendations see above    Pt [id number], Family [id number]  Time spent with patient and/or family: 21  Time reviewing records: 10  Time communicating with staff: 10    A total of 40 minutes spent with the patient and family on unit greater than 50% in counseling regarding palliative care and in goals of care for the patient. Thank you to Dr. Steffanie Bazan for this consultation. We will continue to follow Mr. Rena Fylnn care as needed.       Tony Nieto NP  [address] [place] [address]

## 2019-07-01 NOTE — CARE COORDINATION
(Smoked in the last 12 months) No  daily weights and parameters  Yes  Avoid NSAIDS in HF Yes  written HF education given Yes     Follow up appointment Yes    ICD counseling No and Hospice care  Patient can Teach Back:   Worsening HF symptoms and when to report No   Weight gain to report to healthcare provider No  Amount of sodium to eat per day No  Name of their Lucius Bradford No  Patient provided with HF packet. Patient is comfort care and discharging with [facility] hospice care. Time Spent Educating: 10 minutes      Total Time Spent on Patient Education:  Cristin Gomez- discharge with hospice    DISCHARGE PLAN:  Services at Discharge: Other - home with hospice care  Community Resources:   Equipment at Discharge:   Destination: [facility]    Patient Self-Management:   Working scale at home: Yes  Reliable transportation: Yes  PCP: Yes  Advance Directive: Yes  Palliative Care Consult: Yes    Discharge Instructions:   Daily Weights: Weigh each morning at the same time and write down weights to bring to clinic visit  Goal Weight: 225 lb    Follow Up Instructions: 7 day hospital f/u scheduled  ORTEGA Garcia CNP  Go on 7/10/2019  @ 10:20 am  [address]  [phone]           I approve the established CHF interdisciplinary plan of care as documented within the medical record of Christopher Sood.        Signature Cheryl Koch RN  EXT [id number]

## 2019-07-01 NOTE — PROGRESS NOTES
Clinical Pharmacy Progress Note  Pharmacy to dose vancomycin    Admit date: 6/28/2019     Interval Update:  Currently on dopamine + norepinephrine drips; cardiogenic vs infectious etiology of shock. Poor UOP overnight. Subjective:  Pt is a 70yom who is admitted with acute on chronic HF exacerbation, new onset afib, and sepsis 2/2 R arm cellulitis. PMHx includes HTN, DM 2, CAD, CKD, PAD, cocaine abuse, CVA, seizure, peripheral neuropathy, nonischemic cardiomyopaty, prior Rt TMA, and multiple LLE surgeries for infection    Pharmacy has been consulted by Dr. Sadie Reyes to dose vancomycin. Patient is known to the clinical pharmacy team from prior admissions. In May 2017, patient was on Vancomycin 1.25g IV q18h, which resulted in troughs of 13.8 & 14.1mcg/mL (SCr ~ 1 during that admission). In April 2018, patient was on Vancomycin 1.5g IV q24h, resulting in troughs of 13.1 & 16 mcg/mL (Scr ~1.3 during that admission). Current antibiotic regimen:   Zosyn 3.375g IV q8h -- day #4  Clindamycin 900mg IV q8h -- day #2  Vancomycin -- pharmacy to dose -- day #4   1.5g IV x 1 in the ED (6/28)    Intermittent via levels (6/29-now)    Date Type Vancomycin level Vancomycin Dose    6/28 - - 1.5 g   6/29 Random 9.5 1.5g   6/30 Random 17.6 1g   7/1 Random 21.8 1 g ordered     Objective:  Lab Results   Component Value Date    INR 1.96 (H) 07/01/2019       Recent Labs     07/01/19  0308   WBC 31.1*   HGB 12.6*   HCT 39.2*   MCV 78.0*   *       Recent Labs     06/30/19  1629   *   K 4.5   CL 94*   CO2 17*   PHOS 4.1   BUN 43*   CREATININE 1.7*       Estimated Creatinine Clearance: 51 mL/min (A) (based on SCr of 1.7 mg/dL (H)). Micro:  Date: Source: Micro:   6/28 R arm wound Streptococcus pyogenes   6/28 Blood #1 Streptococcus pyogenes   6/28 Blood #2 NGTD            Assessment/Plan:    1.   Sepsis - Bacteremia, R arm cellulitis   Vancomycin -- pharmacy to dose:  · Blood and wound cultures are growing

## 2019-07-01 NOTE — CARE COORDINATION
Case Management Assessment           Initial Evaluation                Date / Time of Evaluation: 7/1/2019 4:06 PM                 Assessment Completed by: aCndelario Salinas    Patient Name: Felice Jonas     YOB: 1950  Diagnosis: Sepsis ([facility] [address] [A41.9]  Sepsis ([facility] [address] [A41.9]     Date / Time: 6/28/2019  4:43 PM    Patient Admission Status: Inpatient    If patient is discharged prior to next notation, then this note serves as note for discharge by case management. Current PCP: Britney Caldwell Patient: No    Chart Reviewed: Yes  Patient/ Family Interviewed:  No    Initial assessment completed at bedside with:  Palliative Care spoke too Daughter & Son on telephone today    Hospitalization in the last 30 days: No    Emergency Contacts:  Extended Emergency Contact Information  Primary Emergency Contact: [address] Phone: [phone]  Relation: Brother/Sister  Secondary Emergency Contact: Mane Roca  Address: [place]  Home Phone: [phone]  Mobile Phone: [phone]  Relation: Other    Advance Directives:   Code Status: Full [address]: No    Financial  Payor: Yasmeen Kaur / Plan: MEDICARE PART A AND B / Product Type: *No Product type* /     Pre-cert required for SNF: No    Pharmacy    [facility] [address] FRANTZ HARTMANN [address] [phone]  [address] [place] [address]  Phone: [phone] Fax: [phone]    [facility] [address] FRANTZ HARTMANN [address] [phone] [facility] [phone]  [address] [address]  Phone: [phone] Fax: [phone]      Potential assistance Purchasing Medications: Potential Assistance Purchasing Medications: No  Does Patient want to participate in local refill/ meds to beds program?: Not Assessed    Meds To Beds General Rules:  1. Can ONLY be done Monday- Friday between 8:30am-5pm  2.  Prescription(s) must be in pharmacy for daughter. CM will follow up. No precert will be needed for insurance since patient has traditional Medicare. Nona Garibay and his family were provided with choice of provider; he and his family are in agreement with the discharge plan.     Care Transition patient: Jennifer Vanegas RN  [facility]  Case Management Department  Ph: [phone]

## 2019-07-01 NOTE — CONSULTS
AST 28   ALT 11   BILITOT 3.8*   ALKPHOS 92     Troponin:   Recent Labs     06/28/19  1819 06/28/19  2225 06/29/19  0319   TROPONINI 0.07* 0.06* 0.06*     BNP: No results for input(s): BNP in the last 72 hours. Lipids: Recent Labs     06/29/19  0318   CHOL 42   TRIG 65   HDL 18*   LDLCALC 11   LABVLDL 13     ABGs:   Recent Labs     07/01/19  0644   PHART 7.372   PO2ART 71.3*   JSH4INA 27.2*     INR:   Recent Labs     06/29/19  0319 06/30/19  0356 07/01/19  0308   INR 2.44* 3.10* 1.96*     UA:  Recent Labs     06/28/19  2241   COLORU Yellow   CLARITYU Clear   GLUCOSEU Negative   BILIRUBINUR MODERATE*   KETUA TRACE*   SPECGRAV 1.020   BLOODU MODERATE*   PHUR 5.5  5.5   PROTEINU 100*   UROBILINOGEN 1.0   NITRU POSITIVE*   LEUKOCYTESUR Negative   LABMICR YES   URINETYPE Not Specified      Urine Microscopic: Recent Labs     06/28/19  2241   BACTERIA 1+*   WBCUA 0-2   RBCUA 5-10*     Urine Culture: No results for input(s): LABURIN in the last 72 hours. Urine Chemistry: No results for input(s): Hugo Ahle, PROTEINUR, NAUR in the last 72 hours. IMAGING:  US ABDOMEN LIMITED   Final Result   IMPRESSION :      Significant study limitations due to patient condition, and prominent bowel gas. Sonographic signs findings of cirrhosis. Nonvisualization of the gallbladder and pancreas. CT HAND RIGHT WO CONTRAST   Final Result      Soft tissue swelling identified involving the right forearm and arm. However no subcutaneous gas is identified to suggest necrotizing fasciitis. No acute osseous process identified. No subcutaneous fluid collections are appreciated. CT RADIUS ULNA RIGHT WO CONTRAST   Final Result      Soft tissue swelling identified involving the right forearm and arm. However no subcutaneous gas is identified to suggest necrotizing fasciitis. No acute osseous process identified. No subcutaneous fluid collections are appreciated.       CT HUMERUS RIGHT WO CONTRAST   Final Result      Soft tissue swelling identified involving the right forearm and arm. However no subcutaneous gas is identified to suggest necrotizing fasciitis. No acute osseous process identified. No subcutaneous fluid collections are appreciated. XR CHEST PORTABLE   Final Result      Patchy groundglass opacity in the right upper lung                  XR KNEE RIGHT (1-2 VIEWS)   Final Result      Moderate to severe degenerative changes      XR ELBOW RIGHT (2 VIEWS)   Final Result      No acute bony pathology      XR RADIUS ULNA RIGHT (2 VIEWS)   Final Result      No acute bony pathology          Assessment/Plan     1. MADYSON on CKD-III: possibly 2/2 septic shock v/s cardiorenal  - Will measure CVP to assess fluid status  - Continue Abx  - Urine studies sent   - Avoid Nephrotoxic medications    2.  Hyponatremia: possibly hypervolemic hyponatremia  - Serum Osm   - f/u CVP     Thank you for allowing us to participate in care of Renita Reeder MD  PGY-2  7/1/2019    Discussed case with Dr. Marcelo Simental    Pt seen and examined   BP low on 2 pressors   Has septic shock   Poor cardiac function   No IVF   CVP 12   No oxygen requirement   Hold diuretics for now   Wean pressors - can try diuretics once he is on low dose pressors

## 2019-07-01 NOTE — PROGRESS NOTES
Spoke with ICU resident covering patient about vanc level 21.1 done at 3am today and vanc 1000-mg ordered for 8am by Dr. Faina Odonnell, will hold vanc for now and she will check with pharmacy, awaiting further orders.      Joseph Duarte RN

## 2019-07-01 NOTE — PROGRESS NOTES
Notified ICU resident of patient's decreased urine output. Resident stated that nephrology has been consulted and to wait for their recommendation.

## 2019-07-01 NOTE — PROGRESS NOTES
Levophed gtt down to 19mcg/min Dr. Sonda Lesches and Meghna Abler aware along with that urine output has dropped off see flowsheet. , they are here to place arterial line.     Allen Leigh RN

## 2019-07-02 PROBLEM — B95.5 STREPTOCOCCAL BACTEREMIA: Status: ACTIVE | Noted: 2019-01-01

## 2019-07-02 PROBLEM — R78.81 STREPTOCOCCAL BACTEREMIA: Status: ACTIVE | Noted: 2019-01-01

## 2019-07-02 PROBLEM — L03.113 RIGHT ARM CELLULITIS: Status: ACTIVE | Noted: 2019-01-01

## 2019-07-02 NOTE — PROGRESS NOTES
Clinical Pharmacy Progress Note  Pharmacy to dose Vancomycin    Admit date: 6/28/2019     Interval Update:  Currently on norepinephrine. Continued poor UOP overnight, but renal function has remained stable. Subjective:  Pt is a 77 yo male who is admitted with acute on chronic HF exacerbation, new onset afib, and sepsis 2/2 R arm cellulitis. PMHx includes HTN, DM 2, CAD, CKD, PAD, cocaine abuse, CVA, seizure, peripheral neuropathy, nonischemic cardiomyopaty, prior Rt TMA, and multiple LLE surgeries for infection    Pharmacy has been consulted by Dr. Magdalena Barney to dose vancomycin. Patient is known to the clinical pharmacy team from prior admissions. In May 2017, patient was on Vancomycin 1.25g IV q18h, which resulted in troughs of 13.8 & 14.1mcg/mL (SCr ~ 1 during that admission). In April 2018, patient was on Vancomycin 1.5g IV q24h, resulting in troughs of 13.1 & 16 mcg/mL (Scr ~1.3 during that admission). Current antibiotic regimen:   Zosyn 3.375g IV q8h -- day #5  Clindamycin 900mg IV q8h -- day #3  Vancomycin -- pharmacy to dose -- day #5   1.5g IV x 1 in the ED (6/28)    Intermittent via levels (6/29-7/1)   1.25 g IV q24h (7/2-current)    Date Type Vancomycin level Vancomycin Dose    6/28 - - 1.5 g   6/29 Random 9.5 1.5g   6/30 Random 17.6 1g   7/1 Random 21.8 1 g ordered   7/2 Random 19.6 1.25 g IV q24h scheduled     Objective:  Lab Results   Component Value Date    INR 1.81 (H) 07/02/2019       Recent Labs     07/02/19  0421   WBC 34.5*   HGB 11.4*   HCT 34.6*   MCV 76.5*   *       Recent Labs     07/02/19  0421   *   K 3.9   CL 96*   CO2 18*   PHOS 3.7   BUN 46*   CREATININE 1.7*       Estimated Creatinine Clearance: 49 mL/min (A) (based on SCr of 1.7 mg/dL (H)). Micro:  Date: Source: Micro:   6/28 R arm wound Streptococcus pyogenes  Staphylococcus epidermis (rare growth)   6/28 Blood #1 Streptococcus pyogenes   6/28 Blood #2 NGTD            Assessment/Plan:    1.   Sepsis -

## 2019-07-02 NOTE — CONSULTS
disease) stage 3, GFR 30-59 ml/min ([facility])    Toe osteomyelitis, left ([facility])    Critical lower limb ischemia    S/P angioplasty with stent    Subacute osteomyelitis of right foot ([facility])    Osteomyelitis of right foot ([address]    Coronary artery disease involving native coronary artery of native heart without angina pectoris    Pure hypercholesterolemia    Ulcer of right heel, with fat layer exposed ([address]    Cellulitis    Left leg cellulitis    Altered mental status    Sepsis ([address]    Atrial fibrillation with rapid ventricular response ([address]    Septic shock ([address]    Cocaine abuse ([address]    Encounter for palliative care    Advance care planning    Acute systolic CHF (congestive heart failure) ([facility])    Coagulopathy ([facility])    Hyperbilirubinemia    Cocaine use    DNR (do not resuscitate)       Mult medical problems - DM, CAD, CVA (dysphagia), iCM with CHF, PVD    R hand / arm infection secondary to GAS  GAS bacteremia  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

## 2019-07-02 NOTE — PROGRESS NOTES
hemodynamic monitoring, goal SBP > 90 for titration of Levophed drip  -Holding BP medications until BP improves   -Continue to titrate Levophed drip for SBP > 90     Coagulopathy: Patient has increased aPTT/ PT and INR to 2.53 not on anticoagulation. DIC less likely (fibrinogen 275). -Hepatitis screening panel daily         Code Status: Full  FEN: NPO  PPX:  Holding Anticoagulation d/t high Merritt Rose MD, PGY-1  07/02/19  8:01 AM    This patient has been staffed and discussed with Miquel Baldwin MD    Pulmonary/critical care     Patient seen and examined. I agree with Dr. Keila Poole history, physical, lab findings, assessment and plan.     ECHO:   Left ventricular cavity size is normal. There is asymmetric hypertrophy of  the septum. Overall left ventricular systolic function appears severely   reduced with an estimated ejection fraction of 10-20%. There is severe global hypokinesis of the left ventricle. Diastolic function is  indeterminate.   The right ventricular size appears mildly enlarged and severely hypokinetic.   Mild to moderate tricuspid regurgitation. [address] not well visualized, it appears dilated with decreased respirophasic change, consistent with RAP 15 mmhg. Estimated pulmonary artery systolic pressure is at 39 mmHg assuming a right atrial pressure of 15 mmHg.     Assessment  1. Septic shock -strep pyogenes bacteremia. Cuff blood pressure much lower than arterial line. Levophed able to be titrated from 14 down to 2  2. Cellulitis  3. Acute encephalopathy  4. History of CVA  5. Worsening leukocytosis  6. Thrombocytopenia  7. MADYSON  8. Lactic acidemia  9. Coagulopathy -doubt DIC given fibrinogen. 10.  Cardiogenic shock     Plan  1. Titrate Levophed for goal map 65 or higher  2. Start diuresis tomorrow   5. Vitamin K 10 mg IV x1 more dose  6. Change antibiotics to clindamycin and Unasyn  7. Appreciate nephrology, infectious disease, and cardiology consults  8.

## 2019-07-02 NOTE — PROGRESS NOTES
Hospitalist Progress Note      PCP: Yao Swain    Date of Admission: 6/28/2019    Chief Complaint: Altered mental status, arm swelling    Hospital Course: 76-year-old male admitted to hospital with right arm swelling and altered mental status and shock. Admitted to the intensive care unit being treated for sepsis, right arm cellulitis and acute on chronic systolic heart failure. Remains on pressors and antibiotics. Subjective:   Ill-appearing. Open eyes to verbal command but unable to follow commands    PRN Meds: perflutren lipid microspheres, magnesium sulfate, sodium chloride flush, ondansetron, glucose, dextrose, glucagon (rDNA), dextrose      Intake/Output Summary (Last 24 hours) at 7/2/2019 0915  Last data filed at 7/2/2019 0558  Gross per 24 hour   Intake 1820.41 ml   Output 322 ml   Net 1498.41 ml       Physical Exam Performed:    BP (!) 99/59   Pulse 76   Temp 98.1 °F (36.7 °C) (Rectal)   Resp 21   Ht 5' 11\" (1.803 m)   Wt 223 lb 8.7 oz (101.4 kg)   SpO2 96%   BMI 31.18 kg/m²     General appearance: Ill-appearing male disoriented confused  HEENT: Pupils equal, round, and reactive to light. Conjunctivae/corneas clear. Neck: Supple, with full range of motion. No jugular venous distention. Trachea midline. Respiratory: Decreased breath sounds at base. Cardiovascular: Tachycardic  Abdomen: Soft, non-tender, non-distended with normal bowel sounds. Musculoskeletal: Extremities with 2+ edema. R arm in dressing. R side of the neck erythematous and warm to touch  Skin: Skin color, texture, turgor normal.  No rashes or lesions.   Neurologic: Moving arms and legs not following commands  Psychiatric: Disoriented  Capillary Refill: Brisk,< 3 seconds   Peripheral Pulses: +2 palpable, equal bilaterally     Labs:     Urinalysis:      Lab Results   Component Value Date    NITRU POSITIVE 06/28/2019    WBCUA 0-2 06/28/2019    BACTERIA 1+ 06/28/2019    RBCUA 5-10 06/28/2019    BLOODU MODERATE

## 2019-07-02 NOTE — PROGRESS NOTES
AM assessment complete. Patient opens eyes to name. Unable to comprehend patient's speech at this time. Attempted to see how patient would do with a sip of water and patient wouldn't even try to drink anything. Residents aware. Patient turned. Vital signs stable. Adjusted levophed as needed per order, see eMAR for details. Bed alarm on, brake set, bed in lowest position. Will continue to monitor.

## 2019-07-02 NOTE — PROCEDURES
patient, procedure, equipment, support staff and site/side marked as required. Preparation: Patient was prepped and draped in the usual sterile fashion.   Indications: hemodynamic monitoring  Location: left radial  Anesthesia: local infiltration    Anesthesia:  Local Anesthetic: lidocaine 1% without epinephrine  Anesthetic total: 5 mL    Sedation:  Patient sedated: no    Barrett's test normal: yes  Needle gauge: 20  Seldinger technique: Seldinger technique used  Cutdown: cutdown required  Number of attempts: 1  Post-procedure: line sutured and dressing applied  Post-procedure CMS: normal  Patient tolerance: Patient tolerated the procedure well with no immediate complications          Alvin Cifuentes MD  7/2/2019    Pulmonary/critical care    I was present and supervised procedure    Gilmar Higginbotham MD

## 2019-07-03 NOTE — PROGRESS NOTES
Nephrology Consult Note                                                                                                                                                                                                                                                                                                                                                               Office : [phone]     Fax :[phone]        Patient's Name: Conchis Dang    Bp low   On CC measures per RN       Past Medical History:   Diagnosis Date    CAD (coronary artery disease) 2012    minor, per cardiac cath    [facility]     Diabetes mellitus ([facility]     Diabetic infection of left foot ([facility] 2013    w/osteomyelitis    H/O cocaine abuse 6/25/2015    History of CVA with residual deficit 6/8/2012    Hypertension     MRSA (methicillin resistant staph aureus) culture positive 05/12/2017; 4/17/18; 4/19/18    toe; R foot; bone     Non-healing wound of lower extremity 07/2016    L great toe    Non-ischemic cardiomyopathy ([facility] 2012    per cardiac cath    PAD (peripheral artery disease) ([facility] 07/26/2016    L PIPER - 0.6, unable to obtain readings on R    Peripheral neuropathy     Seizure, convulsion ([facility] 07/2016    Dr. Dmitry Leung - due to scar from old L frontal infarction    Unspecified cerebral artery occlusion with cerebral infarction 6/8/2012       Past Surgical History:   Procedure Laterality Date    CARDIAC CATHETERIZATION  01/31/2012    Dr. Analisa Boone Left 10/19/2017     INCISION AND DRAINAGE WITH AMPUTATION OF 2ND DIGIT LEFT FOOT    FOOT SURGERY Right 04/19/2018    RIGHTFOOT DEBRIDEMENT INCISION AND DRAINAGE WITH 5TH RAY RESECTION    FOOT SURGERY Right 04/25/2018    RIGHT TIBIALIS TENDON TRANSFER, REPEAT INCISION AND DRAINAGE    FOOT TENDON SURGERY Left 05/24/2013    , DPM - I&D to antebellum cortex w/wound closure, perineal tendon transfer    KNEE ARTHROSCOPY      OTHER SURGICAL HISTORY Left 05/17/2017    INCISION AND DRAINAGE, AMPUTATION OF HALLUX LEFT FOOT    TOE AMPUTATION Left 05/21/2013    Dr. Jina Cuello, DPM - I&D to bone cortex w/amputation of 5th metatarsal       Family History   Problem Relation Age of Onset    Kidney Disease Father     Kidney Disease Brother         reports that he quit smoking about 26 years ago. His smoking use included cigarettes. He has never used smokeless tobacco. He reports that he drinks alcohol. He reports that he has current or past drug history. Drug: Marijuana. Allergies:  Patient has no known allergies.     Current Medications:      sodium chloride 0.9 % infusion    [START ON 7/4/2019] famotidine (PEPCID) tablet 20 mg Daily   morphine (PF) injection 4 mg Q4H PRN   LORazepam (ATIVAN) injection 1 mg Q4H PRN   atropine 1 % ophthalmic solution 1 drop Q15 Min PRN   ampicillin-sulbactam (UNASYN) 3 g ivpb minibag Q6H   levETIRAcetam (KEPPRA) 750 mg in sodium chloride 0.9 % 100 mL IVPB Q12H   perflutren lipid microspheres (DEFINITY) injection 1.65 mg ONCE PRN   clindamycin (CLEOCIN) 900 mg in dextrose 5 % 50 mL IVPB Q8H   magnesium sulfate 1 g in dextrose 5% 100 mL IVPB PRN   mupirocin (BACTROBAN) 2 % ointment BID   sodium chloride flush 0.9 % injection 10 mL 2 times per day   sodium chloride flush 0.9 % injection 10 mL PRN   ondansetron (ZOFRAN) injection 4 mg Q6H PRN   glucose (GLUTOSE) 40 % oral gel 15 g PRN   dextrose 50 % IV solution PRN   glucagon (rDNA) injection 1 mg PRN   dextrose 5 % solution PRN   insulin lispro (HUMALOG) injection pen 0-6 Units TID WC   insulin lispro (HUMALOG) injection pen 0-3 Units Nightly   aspirin chewable tablet 81 mg Daily         Physical exam:     Vitals:  BP (!) 99/52   Pulse 58   Temp 96.4 °F (35.8 °C) (Core)   Resp (!) 0   Ht 5' 11\" (1.803 m)   Wt 226 lb 6.6 oz (102.7 kg)   SpO2 93%   BMI 31.58 kg/m²   Constitutional:  Pt in restraints  Skin: no rash, turgor

## 2019-07-03 NOTE — PROGRESS NOTES
CC: sepsis, chf, afib    S: Appears comfortable. Lethargic. Minimal response to stimulation. Tele: Sinus ellie 1st AVB    O:  Physical Exam:  BP (!) 91/35   Pulse 60   Temp 97.3 °F (36.3 °C) (Core)   Resp 19   Ht 5' 11\" (1.803 m)   Wt 223 lb 8.7 oz (101.4 kg)   SpO2 96%   BMI 31.18 kg/m²    General (appearance):  No acute distress  Eyes: anicteric   Neck: soft, No JVD  Ears/Nose/Mouth/Thorat: No cyanosis  CV: RRR +KANG  Respiratory:  Diminished, mild rhonchi  GI: soft, non-tender, non-distended  Skin: Warm, dry. No rashes. +Cool lower extremities    Neuro/Psych: Restless, lethargic, confused  Ext:  No c/c. Bilateral metatarsal amputations. Pitting edema to all extremities. Pulses:  2+ radial     I.O's= +8.6 L    Weight  Admission: Weight: 200 lb (90.7 kg)   Today: Weight: 223 lb 8.7 oz (101.4 kg)    CBC:   Recent Labs     19  0308 19  0421 19  0532   WBC 31.1* 34.5* 31.9*   HGB 12.6* 11.4* 10.2*   HCT 39.2* 34.6* 31.1*   MCV 78.0* 76.5* 76.1*   * 106* 127*     BMP:   Recent Labs     19  0308 19  1700 19  0421 19  0639   * 128* 128* 124*   K 4.3 4.0 3.9 3.8   CL 93* 96* 96* 93*   CO2 17* 18* 18* 17*   PHOS 3.9 3.7 3.7  --    BUN 47* 46* 46* 54*   CREATININE 1.7* 1.8* 1.7* 2.2*     Mag:   Lab Results   Component Value Date    MG 2.00 2019     LIVER PROFILE:   Recent Labs     19  1245 19  0420 19  0639   AST 38* 48* 52*   ALT 18 22 23   BILIDIR 3.4* 3.3*  --    BILITOT 4.2* 4.4* 3.8*   ALKPHOS 70 118 230*     PT/INR:   Recent Labs     19  1245 19  0421 19  0533   PROTIME 21.7* 20.6* 27.2*   INR 1.90* 1.81* 2.39*     APTT:   Recent Labs     19  1245   APTT 42.4*     Pro-BNP:   Lab Results   Component Value Date    PROBNP 31,388 2019    PROBNP 27,282 2019    PROBNP 34,746 2019         Imagin2019 CXR:     1. Stable cardiomegaly. 2. No evidence of any acute pulmonary disease.

## 2019-07-03 NOTE — PROGRESS NOTES
will have as needed Ativan and morphine available for comfort.     Pt has a high probability of imminent or life-threatening deterioration requiring close monitoring, and highly complex decision-making and/or interventions of high intensity to assess, manipulate, and support his critical organ systems to prevent a likely inevitable decline which could occur if left untreated.      A total critical care time 34 minutes was used. This includes but not limited to examining patient, collaborating with other physicians, monitoring vital signs, telemetry, continuous pulse oximetry, and clinical response to IV medications, documentation time, review and interpretation of laboratory and radiological data, review of nursing notes and old record review.  This time excludes any time that may have been spent performing procedures for life threatening organ failure.     Chucho Mahajan MD

## 2019-07-03 NOTE — PROGRESS NOTES
Palliative Medicine Progress Note    Admit Date: 6/28/2019  Hospital Day:  Hospital Day: 6     CC: AMS  HPI: HPI PMH of CVA with deficit,  HFrEF echo 15-20% (2019), h/o cocaine use, PAD, CAD s/p stent placement , HTN, and  transmetarrsal amputation, here with R arm cellulitis, septic shock, cardiogenic shock, remains on pressors. Worsening kidney function today. Recommendations:   Called daughter Radha Hermosillo this morning and gave her a brief update on pt's status today and worsening kidney function. Radha Hermosillo does not think pt would want dialysis started. She does not want interventions that will only prolong the dying process. She would consider interventions only if thought to help reverse the process and get pt back to an acceptable quality of life, which she sees as unlikely due to his chronic health problems and multiple organ system failure now. Will meet with Florecita when she comes to the hospital this afternoon. Spoke with Florecita at the bedside. Pt's mother and sister are also at the bedside. Florecita would like to transition to comfort care and stop pressors. She understands pt's poor prognosis and does not want to prolong the dying process. Other family members are coming to the hospital and then she would like to stop the pressor, likely around 4 pm. D/w Dr. Erum eReves and RN Janell Goldmann. 1. Goals of Care/Advanced Care planning/Code status: DNR/DNI (Limited- no to all interventions). 2. Pain: pt is mildly restless, unclear if he is in pain. He initially presented with c/o back pain. Will monitor. 3. SOB: breathing comfortably on 2 L oxygen. Pt has heart failure with EF 15-20%, was on lasix drip but discontinued now, nephrology on board. 4. Altered mental status/delirium: pt is unable to answer any questions and is restless. He seems to respond a bit more to his daughter.   5. Disposition: transition to comfort care    Subjective:     Scheduled Meds:   famotidine (PEPCID) injection  20 mg Intravenous Daily   

## 2019-07-03 NOTE — PROGRESS NOTES
- d/w ICU team   - Continue Abx  - Urine studies reviewed   - Avoid Nephrotoxic medications    2. Hyponatremia: possibly hypervolemic hyponatremia  - Na stable   - Monitor     3. Septic shock   - on abx     4. Electrolyte imbalance   - monitor and replace as needed     5.  CHF   - EF low   - Monitor   - No oxygen req     Thank you for allowing us to participate in care of Izaiah Richter MD

## 2019-07-04 NOTE — DISCHARGE SUMMARY
.      Hospital Medicine Discharge Summary    Patient: Lobo Paiz     Gender: male  : 1950   Age: 78 y.o. MRN: [id number]    Admitting Physician: Wilda Renee MD  Discharge Physician: Jessi Gardiner MD     Code Status: DNR-CC     Admit Date: 2019   Discharge Date:       Disposition:  {DISPOSITIONS:[id number]}    Discharge Diagnoses: Active Hospital Problems    Diagnosis Date Noted    Streptococcal bacteremia [R78.81, B95.5] 2019    Right arm cellulitis [L03.113] 2019    DNR (do not resuscitate) Teja Patches     Encounter for palliative care [Z51.5]     Advance care planning [Z71.89]     Acute systolic CHF (congestive heart failure) ([address] [I50.21]     Coagulopathy ([address] [D68.9]     Hyperbilirubinemia [E80.6]     Cocaine use [F14.90]     Atrial fibrillation with rapid ventricular response ([address] [I48.91] 2019    Septic shock ([address] [A41.9, R65.21]     Cocaine abuse ([address] [F14.10]     Sepsis ([address] [A41.9] 2019    Elevated troponin [R74.8]     Systolic and diastolic CHF, chronic ([address] [I50.42] 2012       Follow-up appointments:     Outpatient to do list:     Condition at Discharge:  [facility] Patient Condition:[id number]}    Hospital Course:   Mr Colten Stern was admitted to ICU with severe sepsis causing multi organ dysfunction, and required vasopressor. Streptococcus pyogenes isolated in blood and from arm. Tested positive for cocaine on admit. Echo with LVEF 10-20%, consistent with most recent and down from prior. Unable to wean vasopressor or diurese due to hypotension. Palliative care was consulted. Patient and family decided to not start dialysis, withdrawal care, and transition to comfort care.     Discharge Medications:   Current Discharge Medication List        Current Discharge Medication List        Current Discharge Medication List      CONTINUE these medications which have NOT CHANGED    Details   levETIRAcetam (KEPPRA) 750 MG tablet Take 1 musclesNormal respiratory effort. Clear to auscultation, bilaterally without Rales/Wheezes/Rhonchi. Abdomen: Soft, non-tender, non-distended, bowel sounds present, no masses  Musculoskeletal:  No clubbing, no cyanosis, *** edema  Skin: No lesion or masses  Neurologic:  Neurovascularly intact without any focal sensory/motor deficits. Cranial nerves: II-XII intact, grossly non-focal.  Psychiatric:  {MS AMB PSYCH MSE ALERTNESS:19552}  Neuro: Grossly intact, moves all four extremities     Labs: For convenience and continuity at follow-up the following most recent labs are provided:    Lab Results   Component Value Date    WBC 31.9 07/03/2019    HGB 10.2 07/03/2019    HCT 31.1 07/03/2019    MCV 76.1 07/03/2019     07/03/2019     07/03/2019    K 3.8 07/03/2019    K 4.4 06/28/2019    CL 93 07/03/2019    CO2 17 07/03/2019    BUN 54 07/03/2019    CREATININE 2.2 07/03/2019    CALCIUM 7.6 07/03/2019    PHOS 3.7 07/02/2019    .0 08/24/2018    ALKPHOS 230 07/03/2019    ALT 23 07/03/2019    AST 52 07/03/2019    BILITOT 3.8 07/03/2019    BILIDIR 3.3 07/02/2019    LABALBU 2.1 07/03/2019    LDLCALC 11 06/29/2019    TRIG 65 06/29/2019     Lab Results   Component Value Date    INR 2.39 (H) 07/03/2019    INR 1.81 (H) 07/02/2019    INR 1.90 (H) 07/01/2019       Radiology:  Xr Elbow Right (2 Views)    Result Date: 6/28/2019  RIGHT ELBOW ON 6/28/2019 HISTORY: Pain COMPARISON: None FINDINGS: 3 views of the right elbow show no joint effusion. No fracture or acute bony pathology is seen. Alignment is anatomic. Joint spaces are normal.     No acute bony pathology    Xr Radius Ulna Right (2 Views)    Result Date: 6/28/2019  RIGHT FOREARM ON 6/28/2019 HISTORY: Pain COMPARISON: None FINDINGS: 2 views of the right forearm show no fracture or acute bony pathology. No soft tissue abnormality is seen. Alignment is anatomic.  Joint spaces are normal.     No acute bony pathology    Xr Knee Right (1-2 Views)    Result Date: 6/28/2019  RIGHT KNEE ON 6/28/2019 HISTORY: Pain COMPARISON: Right knee on 5/30/2012. FINDINGS: 2 views of the right knee show no joint effusion or soft tissue abnormality. No fracture or acute bony pathology is visualized. There is tricompartmental joint space narrowing greatest at the medial compartment with small marginal osteophytes. No erosions are seen. Moderate to severe degenerative changes    Xr Abdomen (kub) (single Ap View)    Result Date: 7/2/2019  History: Corpak placement. KUB. FINDINGS: Feeding tube tip identified in the proximal stomach. Nonobstructive bowel gas pattern. No acute osseous abnormality. No free air identified. 1. Feeding tube tip in the proximal stomach. Ct Humerus Right Wo Contrast    Result Date: 6/30/2019  Reason: Concern for necrotizing fasciitis, severe pain CT right humerus, right hand, right radius and ulna TECHNIQUE: Collimated helical images are made from the right shoulder through the right hand in a bone algorithm. CT performed on a current scanner low-dose x-ray techniques. Findings CT of the right hand right forearm and right humerus: Study is significantly limited due to patient positioning and motion. No subcutaneous gas identified involving the right hand right forearm or right humeral region. Significant soft tissue edema is appreciated. No fluid collections are appreciated. Soft tissue swelling identified involving the right forearm and arm. However no subcutaneous gas is identified to suggest necrotizing fasciitis. No acute osseous process identified. No subcutaneous fluid collections are appreciated.     Vl Dup Upper Extremity Arteries Left    Result Date: 7/2/2019  Upper Extremities Arterial Duplex  Demographics   Patient Name       Francisco Vargas   Date of Study      07/02/2019         Gender              Male   Patient Number     [id number]         Date of Birth       1950   Visit Number       [id number]          Age                 78 year(s) Accession Number   [id number]          Room Number         7461   Corporate ID       [id number]           Sonographer         Erik Mcadams MARSHA   Ordering Physician Nazanin Ga,  Interpreting        [facility]                     MD                 Physician           Readers                                                            Nina Mckinney MD  Procedure Type of Study:   Extremities Arteries:Upper Extremities Arterial Duplex, VL UPPER EXTREMITY  ARTERIES DUPLEX LEFT. Vascular Sonographer Report  Indications for Study:Absent pulses. Additional Indications:Patient was evaluated due to diminished pulses in his left arm. Arterial Duplex Scan: Grayscale and color imaging of the extremity arteries, including Doppler spectral waveform analysis. Impressions Left Impression Blood pressures could not be obtained on the left arm per nurse due to an arterial line in the distal radial artery. Duplex imaging reveals patent subclavian, axillary, brachial, radial and ulnar arteries with no narrowing or stenosis visualized. Multiphasic flow is seen throughout. The most distal portions of the radial and ulnar could not be visualized due to arterial line and bandages. There are no previous studies available for comparison. Conclusions   Summary   No evidence of left upper extremity arterial stenotic or occlusive disease  by duplex scan. Signature   ------------------------------------------------------------------  Electronically signed by Nina Mckinney MD (Interpreting  physician) on 07/02/2019 at 03:23 PM  ------------------------------------------------------------------  Patient Status:Routine. Study [facility] - [facility] Lab. Technical Quality:Adequate visualization. Velocities are measured in cm/s ; Diameters are measured in mm Left Upper Extremities Duplex Measurements +---------------+----+----------------+ ! Location       ! [facility]

## 2019-07-04 NOTE — CARE COORDINATION
KEN spoke with daughter just outside of room. Daughter reported that she stated RN told her to talk about hospice. Daughter stated she understands \"his organs are shutting down\". She stated \"I want him to be comfortable\". SW provided some information about hospice and provided brochures. Daughter reported she was unsure about patient going to [facility] versus another facility. SW provided Hospice options and narrowed to [facility] and [facility]. Family choose to go with [facility] at this time. KEN placed call to [facility], Liaison, [phone]. KEN [facility]    KEN called main line at [id number] [facility]. KEN initiated referral.     Bedside RN worked on contacting on call MD regarding Hospice Order for patient as he has transitioned to comfort care. Dr. Nicho Venegas was paged for order. KEN folowing. UPDATE: KEN faxed clinicals at 5:39 pm to [facility] to both corporate and local office. KEN to have packet waiting at [facility] station for patient. KEN provided family member contact information.      Lillian Houser, MSW, LSW     [facility] [email or web address]   [phone]

## 2019-07-04 NOTE — PROGRESS NOTES
Abdominal: Soft. Bowel sounds are normal.   Musculoskeletal: He exhibits edema. Neurological:   Not alert or oriented to person, place or time    Skin: Skin is warm and dry. LABS:    CBC:   Recent Labs     07/02/19 0421 07/03/19  0532   WBC 34.5* 31.9*   HGB 11.4* 10.2*   HCT 34.6* 31.1*   * 127*   MCV 76.5* 76.1*     Renal:    Recent Labs     07/01/19  1700 07/02/19  0420 07/02/19 0421 07/03/19  0639   *  --  128* 124*   K 4.0  --  3.9 3.8   CL 96*  --  96* 93*   CO2 18*  --  18* 17*   BUN 46*  --  46* 54*   CREATININE 1.8*  --  1.7* 2.2*   GLUCOSE 201*  --  196* 146*   CALCIUM 7.4*  --  7.5* 7.6*   MG  --  1.90  --  2.00   PHOS 3.7  --  3.7  --    ANIONGAP 14  --  14 14     Hepatic:   Recent Labs     07/01/19  1245  07/02/19  0420 07/02/19 0421 07/03/19  0639   AST 38*  --  48*  --  52*   ALT 18  --  22  --  23   BILITOT 4.2*  --  4.4*  --  3.8*   BILIDIR 3.4*  --  3.3*  --   --    PROT 4.1*  --  4.9*  --  5.0*   LABALBU 1.9*   < > 2.1* 2.1* 2.1*   ALKPHOS 70  --  118  --  230*    < > = values in this interval not displayed. Troponin: No results for input(s): TROPONINI in the last 72 hours. BNP: No results for input(s): BNP in the last 72 hours. Lipids: No results for input(s): CHOL, HDL in the last 72 hours. Invalid input(s): LDLCALCU, TRIGLYCERIDE  ABGs:    Recent Labs     07/03/19  0955   PHART 7.411   UTE6RXY 24.3*   PO2ART 66.5*   XSM2CMN 15.5*   BEART -9*   L4HFWOAA 94   BKZ4ZRR 16       INR:   Recent Labs     07/01/19  1245 07/02/19  0421 07/03/19  0533   INR 1.90* 1.81* 2.39*     Lactate:   Recent Labs     07/03/19  0955   LACTATE 1.69     Cultures:  -----------------------------------------------------------------  RAD:   XR CHEST PORTABLE   Final Result   1. Stable cardiomegaly. 2. No evidence of any acute pulmonary disease. XR ABDOMEN (KUB) (SINGLE AP VIEW)   Final Result   1. Feeding tube tip in the proximal stomach.       VL DUP UPPER EXTREMITY ARTERIES LEFT

## 2019-07-05 PROBLEM — Z51.5 HOSPICE CARE: Status: ACTIVE | Noted: 2019-01-01

## 2019-07-05 NOTE — PROGRESS NOTES
Nephrology Consult Note                                                                                                                                                                                                                                                                                                                                                               Office : [phone]     Fax :[phone]        Patient's Name: Betty Daniel    On CC measures       Past Medical History:   Diagnosis Date    CAD (coronary artery disease) 2012    minor, per cardiac cath    [facility] [address]     Diabetes mellitus ([address]     Diabetic infection of left foot ([address] 2013    w/osteomyelitis    H/O cocaine abuse 6/25/2015    History of CVA with residual deficit 6/8/2012    Hypertension     MRSA (methicillin resistant staph aureus) culture positive 05/12/2017; 4/17/18; 4/19/18    toe; R foot; bone     Non-healing wound of lower extremity 07/2016    L great toe    Non-ischemic cardiomyopathy ([facility] [address] 2012    per cardiac cath    PAD (peripheral artery disease) ([facility] [address] 07/26/2016    L PIPER - 0.6, unable to obtain readings on R    Peripheral neuropathy     Seizure, convulsion ([facility] [address] 07/2016    Dr. Jatinder Hackett. Xochitl - due to scar from old L frontal infarction    Unspecified cerebral artery occlusion with cerebral infarction 6/8/2012       Past Surgical History:   Procedure Laterality Date    CARDIAC CATHETERIZATION  01/31/2012    Dr. Rain Warren Left 10/19/2017     INCISION AND DRAINAGE WITH AMPUTATION OF 2ND DIGIT LEFT FOOT    FOOT SURGERY Right 04/19/2018    RIGHTFOOT DEBRIDEMENT INCISION AND DRAINAGE WITH 5TH RAY RESECTION    FOOT SURGERY Right 04/25/2018    RIGHT TIBIALIS TENDON TRANSFER, REPEAT INCISION AND DRAINAGE    FOOT TENDON SURGERY Left 05/24/2013    , DPM - I&D to antebellum cortex w/wound closure, perineal tendon transfer    KNEE

## 2019-07-05 NOTE — DISCHARGE SUMMARY
(Axillary)   Resp (!) 6   Ht 5' 11\" (1.803 m)   Wt 226 lb 6.6 oz (102.7 kg)   SpO2 100%   BMI 31.58 kg/m²       General appearance: Ill-appearing male, resting  HEENT: Pupils equal, round, and reactive to light. Conjunctivae/corneas clear. Neck: Supple, with full range of motion. No jugular venous distention. Trachea midline. Respiratory: Decreased breath sounds at base. Cardiovascular: Decreased rate and rhythm, no murmurs noted  Abdomen: Soft, non-tender, non-distended with normal bowel sounds. Musculoskeletal: Extremities with 2+ edema. R arm in dressing. R side of the neck erythematous and warm to touch  Skin: Skin color, texture, turgor normal.  No rashes or lesions. Neurologic: Obtunded  Psychiatric: Unable to determine, resting with closed eyes  Capillary Refill: Brisk, >3 seconds   Peripheral Pulses: +2 palpable, equal bilaterally       Labs: For convenience and continuity at follow-up the following most recent labs are provided:      CBC:    Lab Results   Component Value Date    WBC 31.9 07/03/2019    HGB 10.2 07/03/2019    HCT 31.1 07/03/2019     07/03/2019       Renal:    Lab Results   Component Value Date     07/03/2019    K 3.8 07/03/2019    K 4.4 06/28/2019    CL 93 07/03/2019    CO2 17 07/03/2019    BUN 54 07/03/2019    CREATININE 2.2 07/03/2019    CALCIUM 7.6 07/03/2019    PHOS 3.7 07/02/2019         Significant Diagnostic Studies    Radiology:   XR CHEST PORTABLE   Final Result   1. Stable cardiomegaly. 2. No evidence of any acute pulmonary disease. XR ABDOMEN (KUB) (SINGLE AP VIEW)   Final Result   1. Feeding tube tip in the proximal stomach. VL DUP UPPER EXTREMITY ARTERIES LEFT   Final Result      US ABDOMEN LIMITED   Final Result   IMPRESSION :      Significant study limitations due to patient condition, and prominent bowel gas. Sonographic signs findings of cirrhosis. Nonvisualization of the gallbladder and pancreas.          CT HAND RIGHT WO CONTRAST

## 2019-07-05 NOTE — PROGRESS NOTES
Nephrology Consult Note                                                                                                                                                                                                                                                                                                                                                               Office : [phone]     Fax :[phone]        Patient's Name: Peace Peck    Bp low   On CC measures       Past Medical History:   Diagnosis Date    CAD (coronary artery disease) 2012    minor, per cardiac cath    [facility] [address]     Diabetes mellitus [facility] [address]     Diabetic infection of left foot ([facility] [address] 2013    w/osteomyelitis    H/O cocaine abuse 6/25/2015    History of CVA with residual deficit 6/8/2012    Hypertension     MRSA (methicillin resistant staph aureus) culture positive 05/12/2017; 4/17/18; 4/19/18    toe; R foot; bone     Non-healing wound of lower extremity 07/2016    L great toe    Non-ischemic cardiomyopathy ([facility] [address] 2012    per cardiac cath    PAD (peripheral artery disease) ([facility] [address] 07/26/2016    L PIPER - 0.6, unable to obtain readings on R    Peripheral neuropathy     Seizure, convulsion ([facility] [address] 07/2016    Dr. Roger Mukherjee. Xochitl - due to scar from old L frontal infarction    Unspecified cerebral artery occlusion with cerebral infarction 6/8/2012       Past Surgical History:   Procedure Laterality Date    CARDIAC CATHETERIZATION  01/31/2012    Dr. Lanre Aguilar Left 10/19/2017     INCISION AND DRAINAGE WITH AMPUTATION OF 2ND DIGIT LEFT FOOT    FOOT SURGERY Right 04/19/2018    RIGHTFOOT DEBRIDEMENT INCISION AND DRAINAGE WITH 5TH RAY RESECTION    FOOT SURGERY Right 04/25/2018    RIGHT TIBIALIS TENDON TRANSFER, REPEAT INCISION AND DRAINAGE    FOOT TENDON SURGERY Left 05/24/2013    , DPM - I&D to antebellum cortex w/wound closure, perineal tendon transfer    PO2ART 66.5*   MWM1PVA 24.3*     INR:   Recent Labs     07/03/19  0533   INR 2.39*     UA:  No results for input(s): COLORU, CLARITYU, GLUCOSEU, BILIRUBINUR, KETUA, SPECGRAV, BLOODU, PHUR, PROTEINU, UROBILINOGEN, NITRU, LEUKOCYTESUR, Ansley Lusty in the last 72 hours. Urine Microscopic: No results for input(s): LABCAST, BACTERIA, COMU, HYALCAST, WBCUA, RBCUA, EPIU in the last 72 hours. Urine Culture: No results for input(s): LABURIN in the last 72 hours. Urine Chemistry:   Recent Labs     07/03/19  1006   CLUR <20   LABCREA 102.6   NAUR 27         IMAGING:  No orders to display       Assessment/Plan     1. MADYSON on CKD-III: possibly 2/2 septic shock v/s cardiorenal  - pt going with CC measures     2. Hyponatremia: possibly hypervolemic hyponatremia  - Na worse        3. Septic shock   - on abx     4. Electrolyte imbalance   - monitor and replace as needed     5.  CHF   - EF low        Thank you for allowing us to participate in care of Renee Durant MD

## 2019-07-06 NOTE — PROGRESS NOTES
Hospitalist Progress Note      PCP: Kanwal Cavanaugh    Date of Admission: 7/5/2019    Chief Complaint: Comfort care    Hospital Course: Admitted for comfort care    Subjective: Resting, in no distress      Medications:  Reviewed    Infusion Medications   Scheduled Medications   PRN Meds: ondansetron, atropine, LORazepam, medicated lip ointment, HYDROmorphone      Intake/Output Summary (Last 24 hours) at 7/6/2019 0930  Last data filed at 7/5/2019 1411  Gross per 24 hour   Intake 0 ml   Output 5 ml   Net -5 ml       Physical Exam Performed:    Pulse (!) 42   Resp (!) 6     General appearance: Ill-appearing male, resting  Neck: Supple, with full range of motion. No jugular venous distention. Trachea midline. Respiratory: Decreased breath sounds at base. Cardiovascular: Decreased rate and normal rhythm, no murmurs noted  Abdomen: Soft, non-tender, non-distended with normal bowel sounds. Musculoskeletal: Extremities with 2+ edema. R arm in dressing. R side of the neck erythematous and warm to touch  Neurologic: Obtunded    Labs:   No results for input(s): WBC, HGB, HCT, PLT in the last 72 hours. No results for input(s): NA, K, CL, CO2, BUN, CREATININE, CALCIUM, PHOS in the last 72 hours. Invalid input(s): MAGNES  No results for input(s): AST, ALT, BILIDIR, BILITOT, ALKPHOS in the last 72 hours. No results for input(s): INR in the last 72 hours. No results for input(s): Esteban Patricia in the last 72 hours.     Urinalysis:      Lab Results   Component Value Date    NITRU POSITIVE 06/28/2019    WBCUA 0-2 06/28/2019    BACTERIA 1+ 06/28/2019    RBCUA 5-10 06/28/2019    BLOODU MODERATE 06/28/2019    SPECGRAV 1.020 06/28/2019    GLUCOSEU Negative 06/28/2019    GLUCOSEU Negative 05/01/2012       Assessment/Plan:  Acute metabolic encephalopathy sec to Septic shock/strep pyogenes bacteremia/right arm + neck cellulitis, Acute on chronic systolic heart failure/Cardiomyopathy, MADYSON, Elevated liver

## 2019-07-07 NOTE — DISCHARGE SUMMARY
Hospital Medicine Death Summary    Patient ID: Emely Ruggiero      Patient's PCP: Alma Rosa Devine    Admit Date: 7/5/2019     Pronounced Dead: 12:27 on 07/07/19    Admitting Physician: No admitting provider for patient encounter. Discharge Physician: Yuniel Melendrez MD     Discharge Diagnoses: Active Hospital Problems    Diagnosis Date Noted    Septic shock ([facility] [address] [A41.9, R65.21]        The patient was seen and examined on day of discharge and this discharge summary is in conjunction with any daily progress note from day of discharge. Hospital Course:  Pt was admitted to inpatient hospice after hospitalization at [facility] intensive care unit 06/28-07/05 where he was treated for Septic shock/strep pyogenes bacteremia/right arm + neck cellulitis, Acute on chronic systolic heart failure/Cardiomyopathy, Acute metabolic encephalopathy, MADYSON, Elevated liver enzymes/Coagulopathy, Atrial fibrillation with rapid ventricular rate. Was on pressors and antibiotics. Cardiology and nephrology was following. Diuresis was needed but could not be done due to low BP. Family did not want to pursue dialysis. Had discussion with PC and wanted to pursue comfort care. Comfort measures were continued.  Pt passed away and was pronounced dead on 07/07/19 at 12:37am      Signed:    Yuniel Melendrez MD   7/7/2019

## 2019-07-07 NOTE — PROGRESS NOTES
Upon assessment of pt no respirations were noted. No carotid pulse is palpated. Dr. Sandra Wick, [address] [facility] and [facility] notified.